# Patient Record
Sex: FEMALE | Race: WHITE | NOT HISPANIC OR LATINO | Employment: OTHER | URBAN - METROPOLITAN AREA
[De-identification: names, ages, dates, MRNs, and addresses within clinical notes are randomized per-mention and may not be internally consistent; named-entity substitution may affect disease eponyms.]

---

## 2017-01-09 ENCOUNTER — COMMUNICATION - HEALTHEAST (OUTPATIENT)
Dept: INTERNAL MEDICINE | Facility: CLINIC | Age: 57
End: 2017-01-09

## 2017-01-09 ENCOUNTER — AMBULATORY - HEALTHEAST (OUTPATIENT)
Dept: LAB | Facility: CLINIC | Age: 57
End: 2017-01-09

## 2017-01-09 DIAGNOSIS — D73.5 SPLENIC INFARCT: ICD-10-CM

## 2017-02-17 ENCOUNTER — COMMUNICATION - HEALTHEAST (OUTPATIENT)
Dept: INTERNAL MEDICINE | Facility: CLINIC | Age: 57
End: 2017-02-17

## 2017-02-17 ENCOUNTER — AMBULATORY - HEALTHEAST (OUTPATIENT)
Dept: LAB | Facility: CLINIC | Age: 57
End: 2017-02-17

## 2017-02-17 DIAGNOSIS — D73.5 SPLENIC INFARCT: ICD-10-CM

## 2017-03-15 ENCOUNTER — RECORDS - HEALTHEAST (OUTPATIENT)
Dept: ADMINISTRATIVE | Facility: OTHER | Age: 57
End: 2017-03-15

## 2017-03-15 ENCOUNTER — COMMUNICATION - HEALTHEAST (OUTPATIENT)
Dept: SCHEDULING | Facility: CLINIC | Age: 57
End: 2017-03-15

## 2017-03-20 ENCOUNTER — OFFICE VISIT - HEALTHEAST (OUTPATIENT)
Dept: FAMILY MEDICINE | Facility: CLINIC | Age: 57
End: 2017-03-20

## 2017-03-20 ENCOUNTER — COMMUNICATION - HEALTHEAST (OUTPATIENT)
Dept: NURSING | Facility: CLINIC | Age: 57
End: 2017-03-20

## 2017-03-20 DIAGNOSIS — R05.9 COUGH: ICD-10-CM

## 2017-03-20 DIAGNOSIS — D73.5 SPLENIC INFARCT: ICD-10-CM

## 2017-03-20 DIAGNOSIS — R09.89 CHEST CONGESTION: ICD-10-CM

## 2017-03-20 ASSESSMENT — MIFFLIN-ST. JEOR: SCORE: 1479.69

## 2017-04-03 ENCOUNTER — COMMUNICATION - HEALTHEAST (OUTPATIENT)
Dept: INTERNAL MEDICINE | Facility: CLINIC | Age: 57
End: 2017-04-03

## 2017-04-03 ENCOUNTER — AMBULATORY - HEALTHEAST (OUTPATIENT)
Dept: LAB | Facility: CLINIC | Age: 57
End: 2017-04-03

## 2017-04-03 DIAGNOSIS — D73.5 SPLENIC INFARCT: ICD-10-CM

## 2017-04-25 ENCOUNTER — COMMUNICATION - HEALTHEAST (OUTPATIENT)
Dept: FAMILY MEDICINE | Facility: CLINIC | Age: 57
End: 2017-04-25

## 2017-04-25 DIAGNOSIS — D73.5 SPLENIC INFARCT: ICD-10-CM

## 2017-04-26 ENCOUNTER — COMMUNICATION - HEALTHEAST (OUTPATIENT)
Dept: FAMILY MEDICINE | Facility: CLINIC | Age: 57
End: 2017-04-26

## 2017-05-04 ENCOUNTER — OFFICE VISIT - HEALTHEAST (OUTPATIENT)
Dept: FAMILY MEDICINE | Facility: CLINIC | Age: 57
End: 2017-05-04

## 2017-05-04 ENCOUNTER — COMMUNICATION - HEALTHEAST (OUTPATIENT)
Dept: SCHEDULING | Facility: CLINIC | Age: 57
End: 2017-05-04

## 2017-05-04 DIAGNOSIS — R51.9 SCALP PAIN: ICD-10-CM

## 2017-05-04 DIAGNOSIS — H92.01 RIGHT EAR PAIN: ICD-10-CM

## 2017-05-05 ENCOUNTER — COMMUNICATION - HEALTHEAST (OUTPATIENT)
Dept: FAMILY MEDICINE | Facility: CLINIC | Age: 57
End: 2017-05-05

## 2017-05-09 ENCOUNTER — RECORDS - HEALTHEAST (OUTPATIENT)
Dept: ADMINISTRATIVE | Facility: OTHER | Age: 57
End: 2017-05-09

## 2017-05-15 ENCOUNTER — AMBULATORY - HEALTHEAST (OUTPATIENT)
Dept: LAB | Facility: CLINIC | Age: 57
End: 2017-05-15

## 2017-05-15 ENCOUNTER — COMMUNICATION - HEALTHEAST (OUTPATIENT)
Dept: INTERNAL MEDICINE | Facility: CLINIC | Age: 57
End: 2017-05-15

## 2017-05-15 DIAGNOSIS — D73.5 SPLENIC INFARCT: ICD-10-CM

## 2017-06-13 ENCOUNTER — AMBULATORY - HEALTHEAST (OUTPATIENT)
Dept: LAB | Facility: CLINIC | Age: 57
End: 2017-06-13

## 2017-06-13 ENCOUNTER — COMMUNICATION - HEALTHEAST (OUTPATIENT)
Dept: INTERNAL MEDICINE | Facility: CLINIC | Age: 57
End: 2017-06-13

## 2017-06-13 DIAGNOSIS — D73.5 SPLENIC INFARCT: ICD-10-CM

## 2017-07-20 ENCOUNTER — AMBULATORY - HEALTHEAST (OUTPATIENT)
Dept: LAB | Facility: CLINIC | Age: 57
End: 2017-07-20

## 2017-07-20 ENCOUNTER — COMMUNICATION - HEALTHEAST (OUTPATIENT)
Dept: INTERNAL MEDICINE | Facility: CLINIC | Age: 57
End: 2017-07-20

## 2017-07-20 DIAGNOSIS — D73.5 SPLENIC INFARCT: ICD-10-CM

## 2017-08-31 ENCOUNTER — COMMUNICATION - HEALTHEAST (OUTPATIENT)
Dept: INTERNAL MEDICINE | Facility: CLINIC | Age: 57
End: 2017-08-31

## 2017-08-31 ENCOUNTER — AMBULATORY - HEALTHEAST (OUTPATIENT)
Dept: LAB | Facility: CLINIC | Age: 57
End: 2017-08-31

## 2017-08-31 DIAGNOSIS — D73.5 SPLENIC INFARCT: ICD-10-CM

## 2017-09-19 ENCOUNTER — RECORDS - HEALTHEAST (OUTPATIENT)
Dept: ADMINISTRATIVE | Facility: OTHER | Age: 57
End: 2017-09-19

## 2017-10-11 ENCOUNTER — COMMUNICATION - HEALTHEAST (OUTPATIENT)
Dept: INTERNAL MEDICINE | Facility: CLINIC | Age: 57
End: 2017-10-11

## 2017-10-11 ENCOUNTER — AMBULATORY - HEALTHEAST (OUTPATIENT)
Dept: LAB | Facility: CLINIC | Age: 57
End: 2017-10-11

## 2017-10-11 DIAGNOSIS — D73.5 SPLENIC INFARCT: ICD-10-CM

## 2017-11-22 ENCOUNTER — AMBULATORY - HEALTHEAST (OUTPATIENT)
Dept: LAB | Facility: CLINIC | Age: 57
End: 2017-11-22

## 2017-11-22 ENCOUNTER — COMMUNICATION - HEALTHEAST (OUTPATIENT)
Dept: NURSING | Facility: CLINIC | Age: 57
End: 2017-11-22

## 2017-11-22 DIAGNOSIS — D73.5 SPLENIC INFARCT: ICD-10-CM

## 2017-12-19 ENCOUNTER — OFFICE VISIT - HEALTHEAST (OUTPATIENT)
Dept: FAMILY MEDICINE | Facility: CLINIC | Age: 57
End: 2017-12-19

## 2017-12-19 ENCOUNTER — COMMUNICATION - HEALTHEAST (OUTPATIENT)
Dept: INTERNAL MEDICINE | Facility: CLINIC | Age: 57
End: 2017-12-19

## 2017-12-19 ENCOUNTER — COMMUNICATION - HEALTHEAST (OUTPATIENT)
Dept: FAMILY MEDICINE | Facility: CLINIC | Age: 57
End: 2017-12-19

## 2017-12-19 DIAGNOSIS — D73.5 SPLENIC INFARCT: ICD-10-CM

## 2017-12-19 DIAGNOSIS — Z12.31 VISIT FOR SCREENING MAMMOGRAM: ICD-10-CM

## 2017-12-19 DIAGNOSIS — J01.00 ACUTE MAXILLARY SINUSITIS: ICD-10-CM

## 2017-12-19 DIAGNOSIS — I77.3 ARTERIAL FIBROMUSCULAR DYSPLASIA (H): ICD-10-CM

## 2017-12-19 ASSESSMENT — MIFFLIN-ST. JEOR: SCORE: 1506.06

## 2017-12-22 ENCOUNTER — AMBULATORY - HEALTHEAST (OUTPATIENT)
Dept: LAB | Facility: CLINIC | Age: 57
End: 2017-12-22

## 2017-12-22 ENCOUNTER — COMMUNICATION - HEALTHEAST (OUTPATIENT)
Dept: INTERNAL MEDICINE | Facility: CLINIC | Age: 57
End: 2017-12-22

## 2017-12-22 DIAGNOSIS — D73.5 SPLENIC INFARCT: ICD-10-CM

## 2017-12-29 ENCOUNTER — COMMUNICATION - HEALTHEAST (OUTPATIENT)
Dept: FAMILY MEDICINE | Facility: CLINIC | Age: 57
End: 2017-12-29

## 2018-01-03 ENCOUNTER — OFFICE VISIT - HEALTHEAST (OUTPATIENT)
Dept: FAMILY MEDICINE | Facility: CLINIC | Age: 58
End: 2018-01-03

## 2018-01-03 DIAGNOSIS — M54.9 BACK PAIN: ICD-10-CM

## 2018-01-03 DIAGNOSIS — J10.1 INFLUENZA A: ICD-10-CM

## 2018-01-03 DIAGNOSIS — R05.9 COUGH: ICD-10-CM

## 2018-01-03 DIAGNOSIS — Z79.01 ANTICOAGULATED: ICD-10-CM

## 2018-01-03 LAB
BASOPHILS # BLD AUTO: 0 THOU/UL (ref 0–0.2)
BASOPHILS NFR BLD AUTO: 1 % (ref 0–2)
EOSINOPHIL # BLD AUTO: 0.1 THOU/UL (ref 0–0.4)
EOSINOPHIL NFR BLD AUTO: 2 % (ref 0–6)
ERYTHROCYTE [DISTWIDTH] IN BLOOD BY AUTOMATED COUNT: 11.4 % (ref 11–14.5)
FLUAV AG SPEC QL IA: ABNORMAL
FLUBV AG SPEC QL IA: ABNORMAL
HCT VFR BLD AUTO: 40.3 % (ref 35–47)
HGB BLD-MCNC: 13.7 G/DL (ref 12–16)
INR PPP: 1.9 (ref 0.9–1.1)
LYMPHOCYTES # BLD AUTO: 1.5 THOU/UL (ref 0.8–4.4)
LYMPHOCYTES NFR BLD AUTO: 48 % (ref 20–40)
MCH RBC QN AUTO: 31.2 PG (ref 27–34)
MCHC RBC AUTO-ENTMCNC: 33.9 G/DL (ref 32–36)
MCV RBC AUTO: 92 FL (ref 80–100)
MONOCYTES # BLD AUTO: 0.4 THOU/UL (ref 0–0.9)
MONOCYTES NFR BLD AUTO: 12 % (ref 2–10)
NEUTROPHILS # BLD AUTO: 1.2 THOU/UL (ref 2–7.7)
NEUTROPHILS NFR BLD AUTO: 38 % (ref 50–70)
PLATELET # BLD AUTO: 224 THOU/UL (ref 140–440)
PMV BLD AUTO: 7.3 FL (ref 7–10)
RBC # BLD AUTO: 4.38 MILL/UL (ref 3.8–5.4)
WBC: 3.2 THOU/UL (ref 4–11)

## 2018-01-03 RX ORDER — WARFARIN SODIUM 7.5 MG/1
7.5 TABLET ORAL
Status: SHIPPED | COMMUNITY
Start: 2017-12-11 | End: 2022-09-13

## 2018-01-04 ENCOUNTER — COMMUNICATION - HEALTHEAST (OUTPATIENT)
Dept: FAMILY MEDICINE | Facility: CLINIC | Age: 58
End: 2018-01-04

## 2018-01-04 DIAGNOSIS — D73.5 SPLENIC INFARCT: ICD-10-CM

## 2018-01-08 ENCOUNTER — COMMUNICATION - HEALTHEAST (OUTPATIENT)
Dept: FAMILY MEDICINE | Facility: CLINIC | Age: 58
End: 2018-01-08

## 2018-01-08 ENCOUNTER — COMMUNICATION - HEALTHEAST (OUTPATIENT)
Dept: INTERNAL MEDICINE | Facility: CLINIC | Age: 58
End: 2018-01-08

## 2018-01-08 ENCOUNTER — AMBULATORY - HEALTHEAST (OUTPATIENT)
Dept: LAB | Facility: CLINIC | Age: 58
End: 2018-01-08

## 2018-01-08 DIAGNOSIS — D73.5 SPLENIC INFARCT: ICD-10-CM

## 2018-01-08 LAB — INR PPP: 2.9 (ref 0.9–1.1)

## 2018-01-09 ENCOUNTER — COMMUNICATION - HEALTHEAST (OUTPATIENT)
Dept: FAMILY MEDICINE | Facility: CLINIC | Age: 58
End: 2018-01-09

## 2018-01-17 ENCOUNTER — COMMUNICATION - HEALTHEAST (OUTPATIENT)
Dept: FAMILY MEDICINE | Facility: CLINIC | Age: 58
End: 2018-01-17

## 2018-01-22 ENCOUNTER — COMMUNICATION - HEALTHEAST (OUTPATIENT)
Dept: INTERNAL MEDICINE | Facility: CLINIC | Age: 58
End: 2018-01-22

## 2018-01-22 ENCOUNTER — AMBULATORY - HEALTHEAST (OUTPATIENT)
Dept: LAB | Facility: CLINIC | Age: 58
End: 2018-01-22

## 2018-01-22 DIAGNOSIS — D73.5 SPLENIC INFARCT: ICD-10-CM

## 2018-01-22 LAB — INR PPP: 3.2 (ref 0.9–1.1)

## 2018-01-26 ENCOUNTER — COMMUNICATION - HEALTHEAST (OUTPATIENT)
Dept: FAMILY MEDICINE | Facility: CLINIC | Age: 58
End: 2018-01-26

## 2018-02-06 ENCOUNTER — COMMUNICATION - HEALTHEAST (OUTPATIENT)
Dept: INTERNAL MEDICINE | Facility: CLINIC | Age: 58
End: 2018-02-06

## 2018-02-06 ENCOUNTER — AMBULATORY - HEALTHEAST (OUTPATIENT)
Dept: LAB | Facility: CLINIC | Age: 58
End: 2018-02-06

## 2018-02-06 DIAGNOSIS — D73.5 SPLENIC INFARCT: ICD-10-CM

## 2018-02-06 LAB — INR PPP: 2.5 (ref 0.9–1.1)

## 2018-03-02 ENCOUNTER — AMBULATORY - HEALTHEAST (OUTPATIENT)
Dept: LAB | Facility: CLINIC | Age: 58
End: 2018-03-02

## 2018-03-02 ENCOUNTER — COMMUNICATION - HEALTHEAST (OUTPATIENT)
Dept: INTERNAL MEDICINE | Facility: CLINIC | Age: 58
End: 2018-03-02

## 2018-03-02 DIAGNOSIS — D73.5 SPLENIC INFARCT: ICD-10-CM

## 2018-03-02 LAB — INR PPP: 3.1 (ref 0.9–1.1)

## 2018-03-16 ENCOUNTER — COMMUNICATION - HEALTHEAST (OUTPATIENT)
Dept: NURSING | Facility: CLINIC | Age: 58
End: 2018-03-16

## 2018-03-16 ENCOUNTER — AMBULATORY - HEALTHEAST (OUTPATIENT)
Dept: LAB | Facility: CLINIC | Age: 58
End: 2018-03-16

## 2018-03-16 DIAGNOSIS — D73.5 SPLENIC INFARCT: ICD-10-CM

## 2018-03-16 LAB — INR PPP: 3.1 (ref 0.9–1.1)

## 2018-03-30 ENCOUNTER — AMBULATORY - HEALTHEAST (OUTPATIENT)
Dept: LAB | Facility: CLINIC | Age: 58
End: 2018-03-30

## 2018-03-30 ENCOUNTER — COMMUNICATION - HEALTHEAST (OUTPATIENT)
Dept: ANTICOAGULATION | Facility: CLINIC | Age: 58
End: 2018-03-30

## 2018-03-30 DIAGNOSIS — D73.5 SPLENIC INFARCT: ICD-10-CM

## 2018-03-30 LAB — INR PPP: 2.7 (ref 0.9–1.1)

## 2018-04-13 ENCOUNTER — AMBULATORY - HEALTHEAST (OUTPATIENT)
Dept: LAB | Facility: CLINIC | Age: 58
End: 2018-04-13

## 2018-04-13 ENCOUNTER — COMMUNICATION - HEALTHEAST (OUTPATIENT)
Dept: ANTICOAGULATION | Facility: CLINIC | Age: 58
End: 2018-04-13

## 2018-04-13 DIAGNOSIS — D73.5 SPLENIC INFARCT: ICD-10-CM

## 2018-04-13 LAB — INR PPP: 2.4 (ref 0.9–1.1)

## 2018-05-14 ENCOUNTER — AMBULATORY - HEALTHEAST (OUTPATIENT)
Dept: LAB | Facility: CLINIC | Age: 58
End: 2018-05-14

## 2018-05-14 ENCOUNTER — COMMUNICATION - HEALTHEAST (OUTPATIENT)
Dept: ANTICOAGULATION | Facility: CLINIC | Age: 58
End: 2018-05-14

## 2018-05-14 DIAGNOSIS — D73.5 SPLENIC INFARCT: ICD-10-CM

## 2018-05-14 DIAGNOSIS — D68.62 LUPUS ANTICOAGULANT DISORDER (H): ICD-10-CM

## 2018-05-14 LAB — INR PPP: 2.3 (ref 0.9–1.1)

## 2018-05-22 ENCOUNTER — OFFICE VISIT - HEALTHEAST (OUTPATIENT)
Dept: FAMILY MEDICINE | Facility: CLINIC | Age: 58
End: 2018-05-22

## 2018-05-22 DIAGNOSIS — S93.401A RIGHT ANKLE SPRAIN: ICD-10-CM

## 2018-05-22 DIAGNOSIS — M25.512 ACUTE PAIN OF LEFT SHOULDER: ICD-10-CM

## 2018-05-22 ASSESSMENT — MIFFLIN-ST. JEOR: SCORE: 1493.59

## 2018-06-21 ENCOUNTER — AMBULATORY - HEALTHEAST (OUTPATIENT)
Dept: LAB | Facility: CLINIC | Age: 58
End: 2018-06-21

## 2018-06-21 ENCOUNTER — COMMUNICATION - HEALTHEAST (OUTPATIENT)
Dept: ANTICOAGULATION | Facility: CLINIC | Age: 58
End: 2018-06-21

## 2018-06-21 DIAGNOSIS — D73.5 SPLENIC INFARCT: ICD-10-CM

## 2018-06-21 DIAGNOSIS — D68.62 LUPUS ANTICOAGULANT DISORDER (H): ICD-10-CM

## 2018-06-21 LAB — INR PPP: 2.9 (ref 0.9–1.1)

## 2018-07-26 ENCOUNTER — COMMUNICATION - HEALTHEAST (OUTPATIENT)
Dept: ANTICOAGULATION | Facility: CLINIC | Age: 58
End: 2018-07-26

## 2018-07-26 ENCOUNTER — AMBULATORY - HEALTHEAST (OUTPATIENT)
Dept: LAB | Facility: CLINIC | Age: 58
End: 2018-07-26

## 2018-07-26 DIAGNOSIS — D68.62 LUPUS ANTICOAGULANT DISORDER (H): ICD-10-CM

## 2018-07-26 DIAGNOSIS — D73.5 SPLENIC INFARCT: ICD-10-CM

## 2018-07-26 LAB — INR PPP: 2.6 (ref 0.9–1.1)

## 2018-08-09 ENCOUNTER — COMMUNICATION - HEALTHEAST (OUTPATIENT)
Dept: FAMILY MEDICINE | Facility: CLINIC | Age: 58
End: 2018-08-09

## 2018-08-23 ENCOUNTER — AMBULATORY - HEALTHEAST (OUTPATIENT)
Dept: LAB | Facility: CLINIC | Age: 58
End: 2018-08-23

## 2018-08-23 ENCOUNTER — COMMUNICATION - HEALTHEAST (OUTPATIENT)
Dept: ANTICOAGULATION | Facility: CLINIC | Age: 58
End: 2018-08-23

## 2018-08-23 DIAGNOSIS — D73.5 SPLENIC INFARCT: ICD-10-CM

## 2018-08-23 DIAGNOSIS — D68.62 LUPUS ANTICOAGULANT DISORDER (H): ICD-10-CM

## 2018-08-23 LAB — INR PPP: 2.2 (ref 0.9–1.1)

## 2018-09-17 ENCOUNTER — COMMUNICATION - HEALTHEAST (OUTPATIENT)
Dept: ANTICOAGULATION | Facility: CLINIC | Age: 58
End: 2018-09-17

## 2018-09-17 ENCOUNTER — AMBULATORY - HEALTHEAST (OUTPATIENT)
Dept: LAB | Facility: CLINIC | Age: 58
End: 2018-09-17

## 2018-09-17 DIAGNOSIS — D73.5 SPLENIC INFARCT: ICD-10-CM

## 2018-09-17 DIAGNOSIS — D68.62 LUPUS ANTICOAGULANT DISORDER (H): ICD-10-CM

## 2018-09-17 LAB — INR PPP: 3.1 (ref 0.9–1.1)

## 2018-10-02 ENCOUNTER — COMMUNICATION - HEALTHEAST (OUTPATIENT)
Dept: FAMILY MEDICINE | Facility: CLINIC | Age: 58
End: 2018-10-02

## 2018-10-02 ENCOUNTER — COMMUNICATION - HEALTHEAST (OUTPATIENT)
Dept: ANTICOAGULATION | Facility: CLINIC | Age: 58
End: 2018-10-02

## 2018-10-02 ENCOUNTER — AMBULATORY - HEALTHEAST (OUTPATIENT)
Dept: LAB | Facility: CLINIC | Age: 58
End: 2018-10-02

## 2018-10-02 DIAGNOSIS — D73.5 SPLENIC INFARCT: ICD-10-CM

## 2018-10-02 DIAGNOSIS — D68.62 LUPUS ANTICOAGULANT DISORDER (H): ICD-10-CM

## 2018-10-02 LAB — INR PPP: 2.9 (ref 0.9–1.1)

## 2018-11-01 ENCOUNTER — COMMUNICATION - HEALTHEAST (OUTPATIENT)
Dept: ANTICOAGULATION | Facility: CLINIC | Age: 58
End: 2018-11-01

## 2018-11-01 ENCOUNTER — AMBULATORY - HEALTHEAST (OUTPATIENT)
Dept: LAB | Facility: CLINIC | Age: 58
End: 2018-11-01

## 2018-11-01 DIAGNOSIS — D68.62 LUPUS ANTICOAGULANT DISORDER (H): ICD-10-CM

## 2018-11-01 DIAGNOSIS — D73.5 SPLENIC INFARCT: ICD-10-CM

## 2018-11-01 LAB — INR PPP: 2.3 (ref 0.9–1.1)

## 2018-12-04 ENCOUNTER — COMMUNICATION - HEALTHEAST (OUTPATIENT)
Dept: ANTICOAGULATION | Facility: CLINIC | Age: 58
End: 2018-12-04

## 2018-12-04 ENCOUNTER — AMBULATORY - HEALTHEAST (OUTPATIENT)
Dept: LAB | Facility: CLINIC | Age: 58
End: 2018-12-04

## 2018-12-04 DIAGNOSIS — D73.5 SPLENIC INFARCT: ICD-10-CM

## 2018-12-04 DIAGNOSIS — D68.62 LUPUS ANTICOAGULANT DISORDER (H): ICD-10-CM

## 2018-12-04 LAB — INR PPP: 2 (ref 0.9–1.1)

## 2018-12-07 ENCOUNTER — OFFICE VISIT - HEALTHEAST (OUTPATIENT)
Dept: FAMILY MEDICINE | Facility: CLINIC | Age: 58
End: 2018-12-07

## 2018-12-07 DIAGNOSIS — D68.62 LUPUS ANTICOAGULANT DISORDER (H): ICD-10-CM

## 2018-12-07 DIAGNOSIS — E03.9 ACQUIRED HYPOTHYROIDISM: ICD-10-CM

## 2018-12-07 DIAGNOSIS — M79.672 LEFT FOOT PAIN: ICD-10-CM

## 2018-12-07 DIAGNOSIS — I10 HYPERTENSION, UNSPECIFIED TYPE: ICD-10-CM

## 2018-12-07 LAB
ANION GAP SERPL CALCULATED.3IONS-SCNC: 9 MMOL/L (ref 5–18)
BUN SERPL-MCNC: 9 MG/DL (ref 8–22)
CALCIUM SERPL-MCNC: 9.6 MG/DL (ref 8.5–10.5)
CHLORIDE BLD-SCNC: 102 MMOL/L (ref 98–107)
CO2 SERPL-SCNC: 28 MMOL/L (ref 22–31)
CREAT SERPL-MCNC: 0.76 MG/DL (ref 0.6–1.1)
ERYTHROCYTE [DISTWIDTH] IN BLOOD BY AUTOMATED COUNT: 12.5 % (ref 11–14.5)
GFR SERPL CREATININE-BSD FRML MDRD: >60 ML/MIN/1.73M2
GLUCOSE BLD-MCNC: 94 MG/DL (ref 70–125)
HCT VFR BLD AUTO: 43.2 % (ref 35–47)
HGB BLD-MCNC: 14.2 G/DL (ref 12–16)
MCH RBC QN AUTO: 30.8 PG (ref 27–34)
MCHC RBC AUTO-ENTMCNC: 32.9 G/DL (ref 32–36)
MCV RBC AUTO: 94 FL (ref 80–100)
PLATELET # BLD AUTO: 281 THOU/UL (ref 140–440)
PMV BLD AUTO: 9.9 FL (ref 8.5–12.5)
POTASSIUM BLD-SCNC: 4.1 MMOL/L (ref 3.5–5)
RBC # BLD AUTO: 4.61 MILL/UL (ref 3.8–5.4)
SODIUM SERPL-SCNC: 139 MMOL/L (ref 136–145)
WBC: 5 THOU/UL (ref 4–11)

## 2018-12-07 ASSESSMENT — MIFFLIN-ST. JEOR: SCORE: 1451.63

## 2018-12-10 ENCOUNTER — COMMUNICATION - HEALTHEAST (OUTPATIENT)
Dept: FAMILY MEDICINE | Facility: CLINIC | Age: 58
End: 2018-12-10

## 2018-12-12 ENCOUNTER — COMMUNICATION - HEALTHEAST (OUTPATIENT)
Dept: FAMILY MEDICINE | Facility: CLINIC | Age: 58
End: 2018-12-12

## 2018-12-12 DIAGNOSIS — D73.5 SPLENIC INFARCT: ICD-10-CM

## 2018-12-21 ENCOUNTER — AMBULATORY - HEALTHEAST (OUTPATIENT)
Dept: LAB | Facility: CLINIC | Age: 58
End: 2018-12-21

## 2018-12-21 ENCOUNTER — COMMUNICATION - HEALTHEAST (OUTPATIENT)
Dept: FAMILY MEDICINE | Facility: CLINIC | Age: 58
End: 2018-12-21

## 2018-12-21 DIAGNOSIS — D73.5 SPLENIC INFARCT: ICD-10-CM

## 2018-12-21 DIAGNOSIS — D68.62 LUPUS ANTICOAGULANT DISORDER (H): ICD-10-CM

## 2018-12-21 LAB — INR PPP: 3.5 (ref 0.9–1.1)

## 2019-01-04 ENCOUNTER — COMMUNICATION - HEALTHEAST (OUTPATIENT)
Dept: ANTICOAGULATION | Facility: CLINIC | Age: 59
End: 2019-01-04

## 2019-01-04 ENCOUNTER — AMBULATORY - HEALTHEAST (OUTPATIENT)
Dept: LAB | Facility: CLINIC | Age: 59
End: 2019-01-04

## 2019-01-04 DIAGNOSIS — D73.5 SPLENIC INFARCT: ICD-10-CM

## 2019-01-04 DIAGNOSIS — D68.62 LUPUS ANTICOAGULANT DISORDER (H): ICD-10-CM

## 2019-01-04 LAB — INR PPP: 2.7 (ref 0.9–1.1)

## 2019-01-18 ENCOUNTER — COMMUNICATION - HEALTHEAST (OUTPATIENT)
Dept: ANTICOAGULATION | Facility: CLINIC | Age: 59
End: 2019-01-18

## 2019-01-18 ENCOUNTER — AMBULATORY - HEALTHEAST (OUTPATIENT)
Dept: LAB | Facility: CLINIC | Age: 59
End: 2019-01-18

## 2019-01-18 DIAGNOSIS — D73.5 SPLENIC INFARCT: ICD-10-CM

## 2019-01-18 DIAGNOSIS — D68.62 LUPUS ANTICOAGULANT DISORDER (H): ICD-10-CM

## 2019-01-18 LAB — INR PPP: 2.8 (ref 0.9–1.1)

## 2019-02-18 ENCOUNTER — COMMUNICATION - HEALTHEAST (OUTPATIENT)
Dept: ANTICOAGULATION | Facility: CLINIC | Age: 59
End: 2019-02-18

## 2019-02-18 ENCOUNTER — AMBULATORY - HEALTHEAST (OUTPATIENT)
Dept: LAB | Facility: CLINIC | Age: 59
End: 2019-02-18

## 2019-02-18 DIAGNOSIS — D73.5 SPLENIC INFARCT: ICD-10-CM

## 2019-02-18 DIAGNOSIS — D68.62 LUPUS ANTICOAGULANT DISORDER (H): ICD-10-CM

## 2019-02-18 LAB — INR PPP: 2.2 (ref 0.9–1.1)

## 2019-03-21 ENCOUNTER — COMMUNICATION - HEALTHEAST (OUTPATIENT)
Dept: ANTICOAGULATION | Facility: CLINIC | Age: 59
End: 2019-03-21

## 2019-03-21 ENCOUNTER — AMBULATORY - HEALTHEAST (OUTPATIENT)
Dept: LAB | Facility: CLINIC | Age: 59
End: 2019-03-21

## 2019-03-21 DIAGNOSIS — D73.5 SPLENIC INFARCT: ICD-10-CM

## 2019-03-21 DIAGNOSIS — D68.62 LUPUS ANTICOAGULANT DISORDER (H): ICD-10-CM

## 2019-03-21 LAB — INR PPP: 2 (ref 0.9–1.1)

## 2019-03-22 ENCOUNTER — COMMUNICATION - HEALTHEAST (OUTPATIENT)
Dept: ANTICOAGULATION | Facility: CLINIC | Age: 59
End: 2019-03-22

## 2019-03-22 DIAGNOSIS — D68.62 LUPUS ANTICOAGULANT DISORDER (H): ICD-10-CM

## 2019-03-22 DIAGNOSIS — D73.5 SPLENIC INFARCT: ICD-10-CM

## 2019-04-25 ENCOUNTER — COMMUNICATION - HEALTHEAST (OUTPATIENT)
Dept: FAMILY MEDICINE | Facility: CLINIC | Age: 59
End: 2019-04-25

## 2019-04-25 ENCOUNTER — COMMUNICATION - HEALTHEAST (OUTPATIENT)
Dept: ANTICOAGULATION | Facility: CLINIC | Age: 59
End: 2019-04-25

## 2019-04-25 DIAGNOSIS — D73.5 SPLENIC INFARCT: ICD-10-CM

## 2019-04-25 DIAGNOSIS — D68.62 LUPUS ANTICOAGULANT DISORDER (H): ICD-10-CM

## 2019-04-26 ENCOUNTER — AMBULATORY - HEALTHEAST (OUTPATIENT)
Dept: LAB | Facility: CLINIC | Age: 59
End: 2019-04-26

## 2019-04-26 ENCOUNTER — COMMUNICATION - HEALTHEAST (OUTPATIENT)
Dept: ANTICOAGULATION | Facility: CLINIC | Age: 59
End: 2019-04-26

## 2019-04-26 DIAGNOSIS — D73.5 SPLENIC INFARCT: ICD-10-CM

## 2019-04-26 DIAGNOSIS — D68.62 LUPUS ANTICOAGULANT DISORDER (H): ICD-10-CM

## 2019-04-26 LAB — INR PPP: 2.4 (ref 0.9–1.1)

## 2019-05-08 ENCOUNTER — RECORDS - HEALTHEAST (OUTPATIENT)
Dept: ADMINISTRATIVE | Facility: OTHER | Age: 59
End: 2019-05-08

## 2019-05-28 ENCOUNTER — AMBULATORY - HEALTHEAST (OUTPATIENT)
Dept: LAB | Facility: CLINIC | Age: 59
End: 2019-05-28

## 2019-05-28 ENCOUNTER — COMMUNICATION - HEALTHEAST (OUTPATIENT)
Dept: ANTICOAGULATION | Facility: CLINIC | Age: 59
End: 2019-05-28

## 2019-05-28 DIAGNOSIS — D68.62 LUPUS ANTICOAGULANT DISORDER (H): ICD-10-CM

## 2019-05-28 DIAGNOSIS — D73.5 SPLENIC INFARCT: ICD-10-CM

## 2019-05-28 LAB — INR PPP: 2.7 (ref 0.9–1.1)

## 2019-07-08 ENCOUNTER — AMBULATORY - HEALTHEAST (OUTPATIENT)
Dept: LAB | Facility: CLINIC | Age: 59
End: 2019-07-08

## 2019-07-08 ENCOUNTER — COMMUNICATION - HEALTHEAST (OUTPATIENT)
Dept: ANTICOAGULATION | Facility: CLINIC | Age: 59
End: 2019-07-08

## 2019-07-08 DIAGNOSIS — D73.5 SPLENIC INFARCT: ICD-10-CM

## 2019-07-08 DIAGNOSIS — D68.62 LUPUS ANTICOAGULANT DISORDER (H): ICD-10-CM

## 2019-07-08 LAB — INR PPP: 2.8 (ref 0.9–1.1)

## 2019-08-12 ENCOUNTER — COMMUNICATION - HEALTHEAST (OUTPATIENT)
Dept: FAMILY MEDICINE | Facility: CLINIC | Age: 59
End: 2019-08-12

## 2019-08-14 ENCOUNTER — COMMUNICATION - HEALTHEAST (OUTPATIENT)
Dept: FAMILY MEDICINE | Facility: CLINIC | Age: 59
End: 2019-08-14

## 2019-08-14 ENCOUNTER — AMBULATORY - HEALTHEAST (OUTPATIENT)
Dept: LAB | Facility: CLINIC | Age: 59
End: 2019-08-14

## 2019-08-14 ENCOUNTER — COMMUNICATION - HEALTHEAST (OUTPATIENT)
Dept: ANTICOAGULATION | Facility: CLINIC | Age: 59
End: 2019-08-14

## 2019-08-14 DIAGNOSIS — D68.62 LUPUS ANTICOAGULANT DISORDER (H): ICD-10-CM

## 2019-08-14 DIAGNOSIS — D73.5 SPLENIC INFARCT: ICD-10-CM

## 2019-08-14 LAB — INR PPP: 3.5 (ref 0.9–1.1)

## 2019-08-26 ENCOUNTER — OFFICE VISIT - HEALTHEAST (OUTPATIENT)
Dept: FAMILY MEDICINE | Facility: CLINIC | Age: 59
End: 2019-08-26

## 2019-08-26 ENCOUNTER — COMMUNICATION - HEALTHEAST (OUTPATIENT)
Dept: ANTICOAGULATION | Facility: CLINIC | Age: 59
End: 2019-08-26

## 2019-08-26 DIAGNOSIS — D73.5 SPLENIC INFARCT: ICD-10-CM

## 2019-08-26 DIAGNOSIS — J01.90 ACUTE NON-RECURRENT SINUSITIS, UNSPECIFIED LOCATION: ICD-10-CM

## 2019-08-26 DIAGNOSIS — D68.62 LUPUS ANTICOAGULANT DISORDER (H): ICD-10-CM

## 2019-08-26 LAB — INR PPP: 3 (ref 0.9–1.1)

## 2019-09-12 ENCOUNTER — COMMUNICATION - HEALTHEAST (OUTPATIENT)
Dept: ANTICOAGULATION | Facility: CLINIC | Age: 59
End: 2019-09-12

## 2019-09-12 ENCOUNTER — AMBULATORY - HEALTHEAST (OUTPATIENT)
Dept: LAB | Facility: CLINIC | Age: 59
End: 2019-09-12

## 2019-09-12 DIAGNOSIS — D68.62 LUPUS ANTICOAGULANT DISORDER (H): ICD-10-CM

## 2019-09-12 DIAGNOSIS — D73.5 SPLENIC INFARCT: ICD-10-CM

## 2019-09-12 LAB — INR PPP: 2.7 (ref 0.9–1.1)

## 2019-10-11 ENCOUNTER — AMBULATORY - HEALTHEAST (OUTPATIENT)
Dept: LAB | Facility: CLINIC | Age: 59
End: 2019-10-11

## 2019-10-11 ENCOUNTER — COMMUNICATION - HEALTHEAST (OUTPATIENT)
Dept: ANTICOAGULATION | Facility: CLINIC | Age: 59
End: 2019-10-11

## 2019-10-11 DIAGNOSIS — D73.5 SPLENIC INFARCT: ICD-10-CM

## 2019-10-11 DIAGNOSIS — D68.62 LUPUS ANTICOAGULANT DISORDER (H): ICD-10-CM

## 2019-10-11 LAB — INR PPP: 3.3 (ref 0.9–1.1)

## 2019-10-25 ENCOUNTER — AMBULATORY - HEALTHEAST (OUTPATIENT)
Dept: LAB | Facility: CLINIC | Age: 59
End: 2019-10-25

## 2019-10-25 ENCOUNTER — COMMUNICATION - HEALTHEAST (OUTPATIENT)
Dept: ANTICOAGULATION | Facility: CLINIC | Age: 59
End: 2019-10-25

## 2019-10-25 DIAGNOSIS — D73.5 SPLENIC INFARCT: ICD-10-CM

## 2019-10-25 DIAGNOSIS — D68.62 LUPUS ANTICOAGULANT DISORDER (H): ICD-10-CM

## 2019-10-25 LAB — INR PPP: 3 (ref 0.9–1.1)

## 2019-11-08 ENCOUNTER — AMBULATORY - HEALTHEAST (OUTPATIENT)
Dept: LAB | Facility: CLINIC | Age: 59
End: 2019-11-08

## 2019-11-08 ENCOUNTER — COMMUNICATION - HEALTHEAST (OUTPATIENT)
Dept: ANTICOAGULATION | Facility: CLINIC | Age: 59
End: 2019-11-08

## 2019-11-08 DIAGNOSIS — D73.5 SPLENIC INFARCT: ICD-10-CM

## 2019-11-08 DIAGNOSIS — D68.62 LUPUS ANTICOAGULANT DISORDER (H): ICD-10-CM

## 2019-11-08 LAB — INR PPP: 3.2 (ref 0.9–1.1)

## 2019-11-22 ENCOUNTER — AMBULATORY - HEALTHEAST (OUTPATIENT)
Dept: LAB | Facility: CLINIC | Age: 59
End: 2019-11-22

## 2019-11-22 ENCOUNTER — COMMUNICATION - HEALTHEAST (OUTPATIENT)
Dept: ANTICOAGULATION | Facility: CLINIC | Age: 59
End: 2019-11-22

## 2019-11-22 DIAGNOSIS — D73.5 SPLENIC INFARCT: ICD-10-CM

## 2019-11-22 DIAGNOSIS — D68.62 LUPUS ANTICOAGULANT DISORDER (H): ICD-10-CM

## 2019-11-22 LAB — INR PPP: 3.1 (ref 0.9–1.1)

## 2019-12-09 ENCOUNTER — COMMUNICATION - HEALTHEAST (OUTPATIENT)
Dept: ANTICOAGULATION | Facility: CLINIC | Age: 59
End: 2019-12-09

## 2019-12-09 ENCOUNTER — AMBULATORY - HEALTHEAST (OUTPATIENT)
Dept: LAB | Facility: CLINIC | Age: 59
End: 2019-12-09

## 2019-12-09 DIAGNOSIS — D68.62 LUPUS ANTICOAGULANT DISORDER (H): ICD-10-CM

## 2019-12-09 DIAGNOSIS — D73.5 SPLENIC INFARCT: ICD-10-CM

## 2019-12-09 LAB — INR PPP: 2.3 (ref 0.9–1.1)

## 2019-12-24 ENCOUNTER — COMMUNICATION - HEALTHEAST (OUTPATIENT)
Dept: ANTICOAGULATION | Facility: CLINIC | Age: 59
End: 2019-12-24

## 2019-12-24 ENCOUNTER — AMBULATORY - HEALTHEAST (OUTPATIENT)
Dept: LAB | Facility: CLINIC | Age: 59
End: 2019-12-24

## 2019-12-24 DIAGNOSIS — D73.5 SPLENIC INFARCT: ICD-10-CM

## 2019-12-24 DIAGNOSIS — D68.62 LUPUS ANTICOAGULANT DISORDER (H): ICD-10-CM

## 2019-12-24 LAB — INR PPP: 2.5 (ref 0.9–1.1)

## 2020-01-14 ENCOUNTER — COMMUNICATION - HEALTHEAST (OUTPATIENT)
Dept: FAMILY MEDICINE | Facility: CLINIC | Age: 60
End: 2020-01-14

## 2020-01-21 ENCOUNTER — COMMUNICATION - HEALTHEAST (OUTPATIENT)
Dept: ANTICOAGULATION | Facility: CLINIC | Age: 60
End: 2020-01-21

## 2020-01-21 ENCOUNTER — AMBULATORY - HEALTHEAST (OUTPATIENT)
Dept: LAB | Facility: CLINIC | Age: 60
End: 2020-01-21

## 2020-01-21 DIAGNOSIS — D73.5 SPLENIC INFARCT: ICD-10-CM

## 2020-01-21 DIAGNOSIS — D68.62 LUPUS ANTICOAGULANT DISORDER (H): ICD-10-CM

## 2020-01-21 LAB — INR PPP: 3.2 (ref 0.9–1.1)

## 2020-02-04 ENCOUNTER — COMMUNICATION - HEALTHEAST (OUTPATIENT)
Dept: ANTICOAGULATION | Facility: CLINIC | Age: 60
End: 2020-02-04

## 2020-02-04 ENCOUNTER — AMBULATORY - HEALTHEAST (OUTPATIENT)
Dept: LAB | Facility: CLINIC | Age: 60
End: 2020-02-04

## 2020-02-04 DIAGNOSIS — D68.62 LUPUS ANTICOAGULANT DISORDER (H): ICD-10-CM

## 2020-02-04 DIAGNOSIS — D73.5 SPLENIC INFARCT: ICD-10-CM

## 2020-02-04 LAB — INR PPP: 2.8 (ref 0.9–1.1)

## 2020-02-18 ENCOUNTER — COMMUNICATION - HEALTHEAST (OUTPATIENT)
Dept: ANTICOAGULATION | Facility: CLINIC | Age: 60
End: 2020-02-18

## 2020-02-18 ENCOUNTER — AMBULATORY - HEALTHEAST (OUTPATIENT)
Dept: LAB | Facility: CLINIC | Age: 60
End: 2020-02-18

## 2020-02-18 DIAGNOSIS — D73.5 SPLENIC INFARCT: ICD-10-CM

## 2020-02-18 DIAGNOSIS — D68.62 LUPUS ANTICOAGULANT DISORDER (H): ICD-10-CM

## 2020-02-18 LAB — INR PPP: 2.4 (ref 0.9–1.1)

## 2020-02-19 ENCOUNTER — COMMUNICATION - HEALTHEAST (OUTPATIENT)
Dept: FAMILY MEDICINE | Facility: CLINIC | Age: 60
End: 2020-02-19

## 2020-03-12 ENCOUNTER — COMMUNICATION - HEALTHEAST (OUTPATIENT)
Dept: ANTICOAGULATION | Facility: CLINIC | Age: 60
End: 2020-03-12

## 2020-03-12 ENCOUNTER — AMBULATORY - HEALTHEAST (OUTPATIENT)
Dept: LAB | Facility: CLINIC | Age: 60
End: 2020-03-12

## 2020-03-12 DIAGNOSIS — D73.5 SPLENIC INFARCT: ICD-10-CM

## 2020-03-12 DIAGNOSIS — D68.62 LUPUS ANTICOAGULANT DISORDER (H): ICD-10-CM

## 2020-03-12 LAB — INR PPP: 2.5 (ref 0.9–1.1)

## 2020-03-13 ENCOUNTER — COMMUNICATION - HEALTHEAST (OUTPATIENT)
Dept: FAMILY MEDICINE | Facility: CLINIC | Age: 60
End: 2020-03-13

## 2020-04-01 ENCOUNTER — COMMUNICATION - HEALTHEAST (OUTPATIENT)
Dept: FAMILY MEDICINE | Facility: CLINIC | Age: 60
End: 2020-04-01

## 2020-04-02 ENCOUNTER — OFFICE VISIT - HEALTHEAST (OUTPATIENT)
Dept: FAMILY MEDICINE | Facility: CLINIC | Age: 60
End: 2020-04-02

## 2020-04-02 DIAGNOSIS — R19.7 DIARRHEA, UNSPECIFIED TYPE: ICD-10-CM

## 2020-04-06 ENCOUNTER — COMMUNICATION - HEALTHEAST (OUTPATIENT)
Dept: ANTICOAGULATION | Facility: CLINIC | Age: 60
End: 2020-04-06

## 2020-04-06 DIAGNOSIS — D73.5 SPLENIC INFARCT: ICD-10-CM

## 2020-04-06 DIAGNOSIS — D68.62 LUPUS ANTICOAGULANT DISORDER (H): ICD-10-CM

## 2020-04-24 ENCOUNTER — COMMUNICATION - HEALTHEAST (OUTPATIENT)
Dept: ANTICOAGULATION | Facility: CLINIC | Age: 60
End: 2020-04-24

## 2020-04-24 ENCOUNTER — AMBULATORY - HEALTHEAST (OUTPATIENT)
Dept: LAB | Facility: CLINIC | Age: 60
End: 2020-04-24

## 2020-04-24 DIAGNOSIS — D73.5 SPLENIC INFARCT: ICD-10-CM

## 2020-04-24 DIAGNOSIS — D68.62 LUPUS ANTICOAGULANT DISORDER (H): ICD-10-CM

## 2020-04-24 LAB — INR PPP: 2.9 (ref 0.9–1.1)

## 2020-06-04 ENCOUNTER — AMBULATORY - HEALTHEAST (OUTPATIENT)
Dept: LAB | Facility: CLINIC | Age: 60
End: 2020-06-04

## 2020-06-04 ENCOUNTER — COMMUNICATION - HEALTHEAST (OUTPATIENT)
Dept: ANTICOAGULATION | Facility: CLINIC | Age: 60
End: 2020-06-04

## 2020-06-04 DIAGNOSIS — D68.62 LUPUS ANTICOAGULANT DISORDER (H): ICD-10-CM

## 2020-06-04 DIAGNOSIS — D73.5 SPLENIC INFARCT: ICD-10-CM

## 2020-06-04 LAB — INR PPP: 2.9 (ref 0.9–1.1)

## 2020-07-13 ENCOUNTER — AMBULATORY - HEALTHEAST (OUTPATIENT)
Dept: LAB | Facility: CLINIC | Age: 60
End: 2020-07-13

## 2020-07-13 ENCOUNTER — COMMUNICATION - HEALTHEAST (OUTPATIENT)
Dept: ANTICOAGULATION | Facility: CLINIC | Age: 60
End: 2020-07-13

## 2020-07-13 ENCOUNTER — COMMUNICATION - HEALTHEAST (OUTPATIENT)
Dept: FAMILY MEDICINE | Facility: CLINIC | Age: 60
End: 2020-07-13

## 2020-07-13 DIAGNOSIS — D73.5 SPLENIC INFARCT: ICD-10-CM

## 2020-07-13 DIAGNOSIS — D68.62 LUPUS ANTICOAGULANT DISORDER (H): ICD-10-CM

## 2020-07-13 LAB — INR PPP: 2.7 (ref 0.9–1.1)

## 2020-08-25 ENCOUNTER — RECORDS - HEALTHEAST (OUTPATIENT)
Dept: ADMINISTRATIVE | Facility: OTHER | Age: 60
End: 2020-08-25

## 2020-09-08 ENCOUNTER — COMMUNICATION - HEALTHEAST (OUTPATIENT)
Dept: ANTICOAGULATION | Facility: CLINIC | Age: 60
End: 2020-09-08

## 2020-09-08 ENCOUNTER — AMBULATORY - HEALTHEAST (OUTPATIENT)
Dept: LAB | Facility: CLINIC | Age: 60
End: 2020-09-08

## 2020-09-08 DIAGNOSIS — D73.5 SPLENIC INFARCT: ICD-10-CM

## 2020-09-08 DIAGNOSIS — D68.62 LUPUS ANTICOAGULANT DISORDER (H): ICD-10-CM

## 2020-09-08 LAB — INR PPP: 2.4 (ref 0.9–1.1)

## 2020-10-15 ENCOUNTER — AMBULATORY - HEALTHEAST (OUTPATIENT)
Dept: LAB | Facility: CLINIC | Age: 60
End: 2020-10-15

## 2020-10-15 ENCOUNTER — COMMUNICATION - HEALTHEAST (OUTPATIENT)
Dept: ANTICOAGULATION | Facility: CLINIC | Age: 60
End: 2020-10-15

## 2020-10-15 DIAGNOSIS — D68.62 LUPUS ANTICOAGULANT DISORDER (H): ICD-10-CM

## 2020-10-15 DIAGNOSIS — D73.5 SPLENIC INFARCT: ICD-10-CM

## 2020-10-15 LAB — INR PPP: 3.3 (ref 0.9–1.1)

## 2020-10-29 ENCOUNTER — AMBULATORY - HEALTHEAST (OUTPATIENT)
Dept: LAB | Facility: CLINIC | Age: 60
End: 2020-10-29

## 2020-10-29 ENCOUNTER — COMMUNICATION - HEALTHEAST (OUTPATIENT)
Dept: ANTICOAGULATION | Facility: CLINIC | Age: 60
End: 2020-10-29

## 2020-10-29 DIAGNOSIS — D73.5 SPLENIC INFARCT: ICD-10-CM

## 2020-10-29 DIAGNOSIS — D68.62 LUPUS ANTICOAGULANT DISORDER (H): ICD-10-CM

## 2020-10-29 LAB — INR PPP: 2.1 (ref 0.9–1.1)

## 2020-11-10 ENCOUNTER — COMMUNICATION - HEALTHEAST (OUTPATIENT)
Dept: ANTICOAGULATION | Facility: CLINIC | Age: 60
End: 2020-11-10

## 2020-11-10 ENCOUNTER — AMBULATORY - HEALTHEAST (OUTPATIENT)
Dept: LAB | Facility: CLINIC | Age: 60
End: 2020-11-10

## 2020-11-10 DIAGNOSIS — D68.62 LUPUS ANTICOAGULANT DISORDER (H): ICD-10-CM

## 2020-11-10 DIAGNOSIS — D73.5 SPLENIC INFARCT: ICD-10-CM

## 2020-11-10 LAB — INR PPP: 2.7 (ref 0.9–1.1)

## 2020-12-10 ENCOUNTER — AMBULATORY - HEALTHEAST (OUTPATIENT)
Dept: LAB | Facility: CLINIC | Age: 60
End: 2020-12-10

## 2020-12-10 ENCOUNTER — COMMUNICATION - HEALTHEAST (OUTPATIENT)
Dept: ANTICOAGULATION | Facility: CLINIC | Age: 60
End: 2020-12-10

## 2020-12-10 DIAGNOSIS — D73.5 SPLENIC INFARCT: ICD-10-CM

## 2020-12-10 DIAGNOSIS — D68.62 LUPUS ANTICOAGULANT DISORDER (H): ICD-10-CM

## 2020-12-10 LAB — INR PPP: 2.5 (ref 0.9–1.1)

## 2020-12-24 ENCOUNTER — OFFICE VISIT - HEALTHEAST (OUTPATIENT)
Dept: FAMILY MEDICINE | Facility: CLINIC | Age: 60
End: 2020-12-24

## 2020-12-24 DIAGNOSIS — H69.93 DYSFUNCTION OF BOTH EUSTACHIAN TUBES: ICD-10-CM

## 2020-12-24 DIAGNOSIS — H92.01 OTALGIA, RIGHT: ICD-10-CM

## 2020-12-24 ASSESSMENT — MIFFLIN-ST. JEOR: SCORE: 1450.49

## 2021-01-07 ENCOUNTER — AMBULATORY - HEALTHEAST (OUTPATIENT)
Dept: LAB | Facility: CLINIC | Age: 61
End: 2021-01-07

## 2021-01-07 ENCOUNTER — COMMUNICATION - HEALTHEAST (OUTPATIENT)
Dept: ANTICOAGULATION | Facility: CLINIC | Age: 61
End: 2021-01-07

## 2021-01-07 DIAGNOSIS — D73.5 SPLENIC INFARCT: ICD-10-CM

## 2021-01-07 DIAGNOSIS — D68.62 LUPUS ANTICOAGULANT DISORDER (H): ICD-10-CM

## 2021-01-07 LAB — INR PPP: 2.9 (ref 0.9–1.1)

## 2021-01-19 ENCOUNTER — COMMUNICATION - HEALTHEAST (OUTPATIENT)
Dept: ANTICOAGULATION | Facility: CLINIC | Age: 61
End: 2021-01-19

## 2021-01-19 ENCOUNTER — AMBULATORY - HEALTHEAST (OUTPATIENT)
Dept: LAB | Facility: CLINIC | Age: 61
End: 2021-01-19

## 2021-01-19 DIAGNOSIS — D73.5 SPLENIC INFARCT: ICD-10-CM

## 2021-01-19 DIAGNOSIS — D68.62 LUPUS ANTICOAGULANT DISORDER (H): ICD-10-CM

## 2021-01-19 LAB — INR PPP: 3.1 (ref 0.9–1.1)

## 2021-01-22 ENCOUNTER — COMMUNICATION - HEALTHEAST (OUTPATIENT)
Dept: FAMILY MEDICINE | Facility: CLINIC | Age: 61
End: 2021-01-22

## 2021-01-26 ENCOUNTER — COMMUNICATION - HEALTHEAST (OUTPATIENT)
Dept: ANTICOAGULATION | Facility: CLINIC | Age: 61
End: 2021-01-26

## 2021-01-26 ENCOUNTER — AMBULATORY - HEALTHEAST (OUTPATIENT)
Dept: LAB | Facility: CLINIC | Age: 61
End: 2021-01-26

## 2021-01-26 DIAGNOSIS — D68.62 LUPUS ANTICOAGULANT DISORDER (H): ICD-10-CM

## 2021-01-26 DIAGNOSIS — D73.5 SPLENIC INFARCT: ICD-10-CM

## 2021-01-26 LAB — INR PPP: 2.2 (ref 0.9–1.1)

## 2021-02-12 ENCOUNTER — AMBULATORY - HEALTHEAST (OUTPATIENT)
Dept: LAB | Facility: CLINIC | Age: 61
End: 2021-02-12

## 2021-02-12 ENCOUNTER — COMMUNICATION - HEALTHEAST (OUTPATIENT)
Dept: ANTICOAGULATION | Facility: CLINIC | Age: 61
End: 2021-02-12

## 2021-02-12 DIAGNOSIS — D68.62 LUPUS ANTICOAGULANT DISORDER (H): ICD-10-CM

## 2021-02-12 DIAGNOSIS — D73.5 SPLENIC INFARCT: ICD-10-CM

## 2021-02-12 LAB — INR PPP: 2.5 (ref 0.9–1.1)

## 2021-03-01 ENCOUNTER — COMMUNICATION - HEALTHEAST (OUTPATIENT)
Dept: ANTICOAGULATION | Facility: CLINIC | Age: 61
End: 2021-03-01

## 2021-03-16 ENCOUNTER — COMMUNICATION - HEALTHEAST (OUTPATIENT)
Dept: ANTICOAGULATION | Facility: CLINIC | Age: 61
End: 2021-03-16

## 2021-03-16 ENCOUNTER — AMBULATORY - HEALTHEAST (OUTPATIENT)
Dept: LAB | Facility: CLINIC | Age: 61
End: 2021-03-16

## 2021-03-16 DIAGNOSIS — D73.5 SPLENIC INFARCT: ICD-10-CM

## 2021-03-16 DIAGNOSIS — D68.62 LUPUS ANTICOAGULANT DISORDER (H): ICD-10-CM

## 2021-03-16 LAB — INR PPP: 2.8 (ref 0.9–1.1)

## 2021-04-21 ENCOUNTER — COMMUNICATION - HEALTHEAST (OUTPATIENT)
Dept: LAB | Facility: CLINIC | Age: 61
End: 2021-04-21

## 2021-04-21 DIAGNOSIS — D73.5 SPLENIC INFARCT: ICD-10-CM

## 2021-04-30 ENCOUNTER — AMBULATORY - HEALTHEAST (OUTPATIENT)
Dept: LAB | Facility: CLINIC | Age: 61
End: 2021-04-30

## 2021-04-30 ENCOUNTER — COMMUNICATION - HEALTHEAST (OUTPATIENT)
Dept: ANTICOAGULATION | Facility: CLINIC | Age: 61
End: 2021-04-30

## 2021-04-30 DIAGNOSIS — D73.5 SPLENIC INFARCT: ICD-10-CM

## 2021-04-30 LAB — INR PPP: 2.5 (ref 0.9–1.1)

## 2021-05-26 ENCOUNTER — RECORDS - HEALTHEAST (OUTPATIENT)
Dept: ADMINISTRATIVE | Facility: CLINIC | Age: 61
End: 2021-05-26

## 2021-05-28 ENCOUNTER — AMBULATORY - HEALTHEAST (OUTPATIENT)
Dept: LAB | Facility: CLINIC | Age: 61
End: 2021-05-28

## 2021-05-28 ENCOUNTER — COMMUNICATION - HEALTHEAST (OUTPATIENT)
Dept: ANTICOAGULATION | Facility: CLINIC | Age: 61
End: 2021-05-28

## 2021-05-28 DIAGNOSIS — D73.5 SPLENIC INFARCT: ICD-10-CM

## 2021-05-28 LAB — INR PPP: 3.2 (ref 0.9–1.1)

## 2021-05-28 NOTE — TELEPHONE ENCOUNTER
Anticoagulation Annual Referral Renewal Review    Silvia Martinez's chart reviewed for annual renewal of referral to anticoagulation monitoring.        Criteria for anticoagulation nurse and/or pharmacist renewal met   Warfarin indication: Splenic infarction and Lupus anticoagulant disorder Yes, per indication   Current with INR monitoring/compliant Yes Yes   Date of last office visit 12/7/2018 Yes, had office visit within last year   Time in Therapeutic Range (TTR) 85.81 % Yes, TTR > 60%       Silvia DESTIN Martinez met all criteria for anticoagulation management program initiated renewal.  New INR standing orders and anticoagulation referral renewal placed.      Maite Johnson, RN  1:39 PM

## 2021-05-28 NOTE — TELEPHONE ENCOUNTER
ANTICOAGULATION  MANAGEMENT    Assessment     Today's INR result of 2.40 is Therapeutic (goal INR of 2.0-3.0)        Warfarin taken as previously instructed    No new diet changes affecting INR    No new medication/supplements affecting INR    Continues to tolerate warfarin with no reported s/s of bleeding or thromboembolism     Previous INR was Therapeutic    Plan:     Spoke with Silvia regarding INR result and instructed:     Warfarin Dosing Instructions:  Continue current warfarin dose 7.5 mg daily on SUN / WED; and 5 mg daily rest of week  (0 % change)    Instructed patient to follow up no later than: 4 weeks    Education provided: importance of consistent vitamin K intake and importance of notifying clinic for changes in medications    Silvia verbalizes understanding and agrees to warfarin dosing plan.    Instructed to call the [facility] for any changes, questions or concerns. (#[phone])   ?   Maite Johnson RN    Subjective/Objective:      Silvia Martinez, a 58 y.o. female is on warfarin.     Silvia reports:     Home warfarin dose: as updated on anticoagulation calendar per template     Missed doses: No     Medication changes:  No     S/S of bleeding or thromboembolism:  No     New Injury or illness:  No     Changes in diet or alcohol consumption:  No     Upcoming surgery, procedure or cardioversion:  Yes: Colonography 5/9/19-no changes to Warfarin needed    Anticoagulation Episode Summary     Current INR goal:   2.0-3.0   TTR:   87.6 % (4.3 y)   Next INR check:   5/24/2019   INR from last check:   2.40 (4/26/2019)   Weekly max warfarin dose:      Target end date:   Indefinite   INR check location:      Preferred lab:      Send INR reminders to:   ANTICOAGULATION POOL C (DTN,VAD,CGR,GAV)    Indications    Splenic infarct [D73.5]           Comments:            Anticoagulation Care Providers     Provider Role Specialty Phone number    Jose Ramon Bolton MD Referring Family Medicine [phone]    Chris Pan,  MD [facility] Medicine [phone]

## 2021-05-28 NOTE — TELEPHONE ENCOUNTER
Please ask [facility] to determine.  I don't think a colonography would require but I will leave it to them.

## 2021-05-28 NOTE — TELEPHONE ENCOUNTER
Spoke with Silvia.  She will contact [facility] and ask about instructions regarding her Warfarin medication. If she does end up needed to make any changes to her regimen she will call the [facility] back to update.    Maite Johnson RN, [facility] Anticoagulation Nurse  [phone]

## 2021-05-28 NOTE — TELEPHONE ENCOUNTER
Silvia checked with [facility] and no changes are needed for her upcoming procedure.    Maite Johnson, RN, [facility] Anticoagulation Nurse  [phone]

## 2021-05-28 NOTE — TELEPHONE ENCOUNTER
FYI - Status Update  Who is Calling: Patient  Update:  Im having a colonography on 5/9 at [place]. My prep starts 5/8 . there are 3 medications I need to take for the prep  1. Moviprep kit  2. Tagitol V 40%  3. Omnipaque 140Mg per ML solution   Im just wondering what I need to do for my INR and do I need to get tested more then my normal testing while on these 3 medications    Okay to leave a detailed message?:  Yes patient requesting all directions be left in a message as she can not answer her phone at work

## 2021-05-28 NOTE — TELEPHONE ENCOUNTER
Dr. Pan,     Patient is scheduled for Colonography on 5/9/19 at [place].    Would you like the patient to HOLD her Warfarin for the procedure / how many day hold?    Do you want the patient to bridge with Lovenox while off of Warfarin?    ACN looked in [facility] for possible medication interactions and none were listed for the medications named below.    Please advise regarding orders, thank you.    Maite Johnson RN, [facility] Anticoagulation Nurse  [phone]

## 2021-05-29 NOTE — TELEPHONE ENCOUNTER
ANTICOAGULATION  MANAGEMENT    Assessment     Today's INR result of 2.70 is Therapeutic (goal INR of 2.0-3.0)        Warfarin taken as previously instructed    No new diet changes affecting INR    No new medication/supplements affecting INR    Continues to tolerate warfarin with no reported s/s of bleeding or thromboembolism     Previous INR was Therapeutic    Plan:     Left a detailed message for Silvia regarding INR result and instructed:     Warfarin Dosing Instructions:  Continue current warfarin dose 7.5 mg daily on SUN / WED; and 5 mg daily rest of week  (0 % change)    Instructed patient to follow up no later than: 4-6 weeks    Education provided: importance of consistent vitamin K intake and importance of notifying clinic for changes in medications    Instructed to call the [facility] for any changes, questions or concerns. (#[phone])   ?   Maite Johnson RN    Subjective/Objective:      Silvia Martinez, a 59 y.o. female is on warfarin.     Silvia reports:     Home warfarin dose: as updated on anticoagulation calendar per template     Missed doses: No     Medication changes:  No     S/S of bleeding or thromboembolism:  No     New Injury or illness:  No     Changes in diet or alcohol consumption:  No     Upcoming surgery, procedure or cardioversion:  No    Anticoagulation Episode Summary     Current INR goal:   2.0-3.0   TTR:   87.9 % (4.4 y)   Next INR check:   7/9/2019   INR from last check:   2.70 (5/28/2019)   Weekly max warfarin dose:      Target end date:   Indefinite   INR check location:      Preferred lab:      Send INR reminders to:   [facility]    Indications    Splenic infarct [D73.5]           Comments:            Anticoagulation Care Providers     Provider Role Specialty Phone number    Jose Ramon Bolton MD Referring Family Medicine [phone]    Chris Pan MD Responsible Family Medicine [phone]

## 2021-05-30 ENCOUNTER — RECORDS - HEALTHEAST (OUTPATIENT)
Dept: ADMINISTRATIVE | Facility: CLINIC | Age: 61
End: 2021-05-30

## 2021-05-30 VITALS — WEIGHT: 197.2 LBS | BODY MASS INDEX: 31.12 KG/M2

## 2021-05-30 VITALS — HEIGHT: 67 IN | BODY MASS INDEX: 30.83 KG/M2 | WEIGHT: 196.44 LBS

## 2021-05-30 NOTE — TELEPHONE ENCOUNTER
ANTICOAGULATION  MANAGEMENT    Assessment     Today's INR result of 2.8 is Therapeutic (goal INR of 2.0-3.0)        Warfarin taken as previously instructed    No new diet changes affecting INR    No new medication/supplements affecting INR    Continues to tolerate warfarin with no reported s/s of bleeding or thromboembolism     Previous INR was Therapeutic for ~6 months    Plan:     Left a detailed message for Silvia regarding INR result and instructed:     Warfarin Dosing Instructions:  Continue current warfarin dose 7.5 mg daily on Sun/Wed; and 5 mg daily rest of week      Instructed patient to follow up no later than: 6-8 weeks    Education provided: importance of consistent vitamin K intake, target INR goal and significance of current INR result, importance of notifying clinic for changes in medications, importance of notifying clinic for diarrhea, nausea/vomiting, reduced intake and/or illness and importance of notifying clinic of upcoming surgeries and procedures 2 weeks in advance        Instructed to call the [facility] for any changes, questions or concerns. (#[phone])   ?   Tara Forbes RN    Subjective/Objective:      Silvia Patelmichelle, a 59 y.o. female is on warfarin.     Silvia reports:     Home warfarin dose: as updated on anticoagulation calendar per template     Missed doses: No     Medication changes:  No     S/S of bleeding or thromboembolism:  No     New Injury or illness:  No     Changes in diet or alcohol consumption:  No     Upcoming surgery, procedure or cardioversion:  No    Anticoagulation Episode Summary     Current INR goal:   2.0-3.0   TTR:   88.2 % (4.5 y)   Next INR check:   9/2/2019   INR from last check:   2.80 (7/8/2019)   Weekly max warfarin dose:      Target end date:   Indefinite   INR check location:      Preferred lab:      Send INR reminders to:   [facility]    Indications    Splenic infarct [D73.5]           Comments:            Anticoagulation Care Providers      Provider Role Specialty Phone number    Jose Ramon Bolton MD Referring Family Medicine [phone]    Chris Pan MD Responsible Family Medicine [phone]

## 2021-05-31 VITALS — WEIGHT: 202 LBS | BODY MASS INDEX: 31.88 KG/M2

## 2021-05-31 VITALS — WEIGHT: 202.25 LBS | BODY MASS INDEX: 31.74 KG/M2 | HEIGHT: 67 IN

## 2021-05-31 NOTE — PROGRESS NOTES
Chief Complaint   Patient presents with     Sinus Problem     Pt c/o R side pressure and R teeth hurt, no drainage, stuffiness, off and on HA       HPI: 59-year-old female presents today with right upper tooth pain and right maxillary facial pressure for the past several days in duration of mild intensity.  This is in the context of having had sinus infections in the past.  She also has mild allergies and is taken some of her 's allergy medication with good results.    ROS: No rhinorrhea no fever no cough, no vomiting    SH:    reports that she has never smoked. She has never used smokeless tobacco. She reports that she does not drink alcohol or use drugs.      FH: The Patient's family history includes Asthma in her mother; Breast cancer in her cousin; Cancer in her maternal grandmother; Hypertension in her father and mother; Osteoporosis in her father and mother; Thyroid disease in her mother.     Meds:  Silvia has a current medication list which includes the following prescription(s): cholecalciferol (vitamin d3), estradiol, levothyroxine, UNABLE TO FIND, UNABLE TO FIND, warfarin, and warfarin.    O:  Pulse 79   Temp 98.5  F (36.9  C)   Resp 16   Wt 194 lb 7 oz (88.2 kg)   SpO2 98%   BMI 30.68 kg/m     Constitutional:    --Vitals as above  --No acute distress  Eyes-  --Sclera noninjected  --Lids and conjunctiva normal  ENT-  --TMs clear  --Sclera noninjected  --Pharynx not erythematous  --Mild right maxillary facial pain to palpation  Neck-  --Neck supple    Lymph-  --No cervical lymphadenopathy  Lungs-  --Clear to Auscultation  Heart-  --Regular rate and rhythm  Skin-  --Pink and dry          A/P:   1. Acute non-recurrent sinusitis, unspecified location  I think she most likely has a sinusitis.  We discussed treatments and at this time would like to hold on antibiotics.  We discussed sinus irrigation, Lyric pot, and if not improving in the next 3 to 5 days would consider calling in  antibiotics.

## 2021-05-31 NOTE — TELEPHONE ENCOUNTER
Called and spoke with Silvia.     - reported for her sinus, she is taking OTC Coricidin HBP (which contains 325mg of Acetaminophen) is OK to take with her warfarin.     - anything greater than 1500mg Acetaminiophen, can increase risk for bleeding.      - she reported not taking any other Acetaminophen products.

## 2021-05-31 NOTE — TELEPHONE ENCOUNTER
Who is calling:  Patient  Reason for Call:  Patient was calling to speak with the nurse about the medication she has been taking for her sinus pressure. Patient said that she started taking it last Thursday and wants to make sure that it will not interfere with her warfarin. Please advise.    Date of last appointment with primary care: n/a  Okay to leave a detailed message: Yes

## 2021-05-31 NOTE — TELEPHONE ENCOUNTER
ANTICOAGULATION  MANAGEMENT    Assessment     Today's INR result of 3.00 is Therapeutic (goal INR of 2.0-3.0)        Warfarin taken as previously instructed    No new diet changes affecting INR    Interaction between Coricidin and warfarin may be affecting INR    Continues to tolerate warfarin with no reported s/s of bleeding or thromboembolism     Previous INR was Supratherapeutic    Plan:     Spoke with Silvia regarding INR result and instructed:     Warfarin Dosing Instructions:  Continue current warfarin dose 7.5 mg daily on SUN / WED; and 5 mg daily rest of week  (0 % change)    Instructed patient to follow up no later than: 2 weeks    Education provided: target INR goal and significance of current INR result and potential interaction between warfarin and Coricidin / APAP    Silvia verbalizes understanding and agrees to warfarin dosing plan.    Instructed to call the [facility] for any changes, questions or concerns. (#[phone])   ?   Maite Johnson RN    Subjective/Objective:      Silvia Martinez, a 59 y.o. female is on warfarin.     Silvia reports:     Home warfarin dose: as updated on anticoagulation calendar per template     Missed doses: No     Medication changes:  Yes: Coricidin as needed     S/S of bleeding or thromboembolism:  No     New Injury or illness:  No     Changes in diet or alcohol consumption:  No     Upcoming surgery, procedure or cardioversion:  No    Anticoagulation Episode Summary     Current INR goal:   2.0-3.0   TTR:   86.2 % (4.6 y)   Next INR check:   9/9/2019   INR from last check:   3.00 (8/26/2019)   Weekly max warfarin dose:      Target end date:   Indefinite   INR check location:      Preferred lab:      Send INR reminders to:   [facility]    Indications    Splenic infarct [D73.5]           Comments:            Anticoagulation Care Providers     Provider Role Specialty Phone number    Jose Ramon Bolton MD Referring Family Medicine [phone]    Chris Pan MD  [facility] Medicine [phone]

## 2021-05-31 NOTE — TELEPHONE ENCOUNTER
ANTICOAGULATION  MANAGEMENT    Assessment     Today's INR result of 3.5 is Supratherapeutic (goal INR of 2.0-3.0)        Warfarin taken as previously instructed    Decreased greens/vitamin K intake may be affecting INR- will get back to normal greens.     Interaction between Coricidin and warfarin may be affecting INR- started taking one pill a day last Thurs for sinus congestion. Pt will start backing off from it now that she feels better.     Continues to tolerate warfarin with no reported s/s of bleeding or thromboembolism     Previous INR was Therapeutic    Plan:     Spoke with Silvia regarding INR result and instructed:     Warfarin Dosing Instructions:  Take one time lower dose of 5 mg today only then continue current warfarin dose    7.5 mg every Sun, Wed; 5 mg all other days         Instructed patient to follow up no later than: 2 weeks.    Education provided: importance of consistent vitamin K intake and potential interaction between warfarin and Coricidin    Silvia verbalizes understanding and agrees to warfarin dosing plan.    Instructed to call the [facility] for any changes, questions or concerns. (#[phone])   ?   Moises Duque RN    Subjective/Objective:      Silvia Martinez, a 59 y.o. female is on warfarin.     Silvia reports:     Home warfarin dose: verbally confirmed home dose with pt and updated on anticoagulation calendar     Missed doses: No     Medication changes:  Yes, was taking Coricidin     S/S of bleeding or thromboembolism:  No     New Injury or illness:  Yes, sinus congestion     Changes in diet or alcohol consumption:  Yes, had less greens.      Upcoming surgery, procedure or cardioversion:  No    Anticoagulation Episode Summary     Current INR goal:   2.0-3.0   TTR:   86.9 % (4.6 y)   Next INR check:   8/28/2019   INR from last check:   3.50! (8/14/2019)   Weekly max warfarin dose:      Target end date:   Indefinite   INR check location:      Preferred lab:      Send INR reminders to:    [facility]    Indications    Splenic infarct [D73.5]           Comments:            Anticoagulation Care Providers     Provider Role Specialty Phone number    Jose Ramon Bolton MD Referring Family Medicine [phone]    Chris Pan MD Responsible Family Medicine [phone]

## 2021-05-31 NOTE — TELEPHONE ENCOUNTER
Left message for Silvia requesting that she call back [facility] on 8/13/19.    ACN could follow up 8/13/19.

## 2021-06-01 ENCOUNTER — RECORDS - HEALTHEAST (OUTPATIENT)
Dept: ADMINISTRATIVE | Facility: CLINIC | Age: 61
End: 2021-06-01

## 2021-06-01 VITALS — HEIGHT: 67 IN | WEIGHT: 199.5 LBS | BODY MASS INDEX: 31.31 KG/M2

## 2021-06-01 NOTE — TELEPHONE ENCOUNTER
ANTICOAGULATION  MANAGEMENT    Assessment     Today's INR result of 2.7 is Therapeutic (goal INR of 2.0-3.0)        Warfarin taken as previously instructed    Decreased greens/vitamin K intake may be affecting INR    No new medication/supplements affecting INR    Continues to tolerate warfarin with no reported s/s of bleeding or thromboembolism     Previous INR was Therapeutic    Plan:     Spoke with Silvia regarding INR result and instructed:     Warfarin Dosing Instructions:  Continue current warfarin dose 7.5 mg daily on Sundays and Wednesdays; and 5 mg daily rest of week  (0 % change)    Instructed patient to follow up no later than: one month.    Education provided: importance of therapeutic range, importance of following up for INR monitoring at instructed interval and importance of taking warfarin as instructed    Silvia verbalizes understanding and agrees to warfarin dosing plan.    Instructed to call the [facility] for any changes, questions or concerns. (#[phone])   ?   Mechelle Cox RN    Subjective/Objective:      Silvia Martinez, a 59 y.o. female is on warfarin.     Silvia reports:     Home warfarin dose: verbally confirmed home dose with Silvia and updated on anticoagulation calendar     Missed doses: No     Medication changes:  No     S/S of bleeding or thromboembolism:  No     New Injury or illness:  No     Changes in diet or alcohol consumption:  Yes: a little off due to her mother in the hospital for five days.     Upcoming surgery, procedure or cardioversion:  No    Anticoagulation Episode Summary     Current INR goal:   2.0-3.0   TTR:   82.7 %   Next INR check:   10/10/2019   INR from last check:   2.70 (9/12/2019)   Weekly max warfarin dose:      Target end date:   Indefinite   INR check location:      Preferred lab:      Send INR reminders to:   [facility]    Indications    Splenic infarct [D73.5]           Comments:            Anticoagulation Care Providers     Provider Role  Specialty Phone number    Jose Ramon Bolton MD Referring Family Medicine [phone]    Chris Pan MD Responsible Family Medicine [phone]

## 2021-06-02 VITALS — HEIGHT: 67 IN | BODY MASS INDEX: 29.86 KG/M2 | WEIGHT: 190.25 LBS

## 2021-06-02 NOTE — TELEPHONE ENCOUNTER
ANTICOAGULATION  MANAGEMENT    Assessment     Today's INR result of 3.0 is Therapeutic (goal INR of 2.0-3.0)        Warfarin taken as previously instructed    No new diet changes affecting INR    No new medication/supplements affecting INR    Continues to tolerate warfarin with no reported s/s of bleeding or thromboembolism     Previous INR was Supratherapeutic    Plan:     Spoke with Silvia regarding INR result and instructed:     Warfarin Dosing Instructions:  Continue current warfarin dose    7.5 mg every Sun, Wed; 5 mg all other days         Instructed patient to follow up no later than: 2 weeks.    Education provided: importance of taking warfarin as instructed    Silvia verbalizes understanding and agrees to warfarin dosing plan.    Instructed to call the [facility] for any changes, questions or concerns. (#[phone])   ?   Moises Duque RN    Subjective/Objective:      Silvia Martinez, a 59 y.o. female is on warfarin.     Silvia reports:     Home warfarin dose: verbally confirmed home dose with pt and updated on anticoagulation calendar     Missed doses: No     Medication changes:  No     S/S of bleeding or thromboembolism:  No     New Injury or illness:  No     Changes in diet or alcohol consumption:  No     Upcoming surgery, procedure or cardioversion:  No    Anticoagulation Episode Summary     Current INR goal:   2.0-3.0   TTR:   77.7 %   Next INR check:   11/8/2019   INR from last check:   3.00 (10/25/2019)   Weekly max warfarin dose:      Target end date:   Indefinite   INR check location:      Preferred lab:      Send INR reminders to:   [facility]    Indications    Splenic infarct [D73.5]           Comments:            Anticoagulation Care Providers     Provider Role Specialty Phone number    Jose Ramon Bolton MD Referring Family Medicine [phone]    Chris Pan MD Responsible Family Medicine [phone]

## 2021-06-02 NOTE — TELEPHONE ENCOUNTER
ANTICOAGULATION  MANAGEMENT    Assessment     Today's INR result of 3.3 is Supratherapeutic (goal INR of 2.0-3.0)        Warfarin taken as previously instructed    No new diet changes affecting INR    Taking some OTC medications for allergy's     Continues to tolerate warfarin with no reported s/s of bleeding or thromboembolism     Previous INR was Therapeutic    Plan:     Spoke with Silvia regarding INR result and instructed:     Warfarin Dosing Instructions:  Continue current warfarin dose 7.5 mg daily on Sundays and Wednesdays; and 5 mg daily rest of week  (0 % change)    Instructed patient to follow up no later than: two weeks    Education provided: importance of consistent vitamin K intake, impact of vitamin K foods on INR, importance of therapeutic range, importance of following up for INR monitoring at instructed interval and importance of taking warfarin as instructed    Silvia verbalizes understanding and agrees to warfarin dosing plan.    Instructed to call the [facility] for any changes, questions or concerns. (#[phone])   ?   Mechelle Cox RN    Subjective/Objective:      Silvia WEIR Juan, a 59 y.o. female is on warfarin.     Silvia reports:     Home warfarin dose: verbally confirmed home dose with Silvia and updated on anticoagulation calendar     Missed doses: No     Medication changes:  Yes: has been taking more Tylenol and allergy medications. But is now starting to take less.      S/S of bleeding or thromboembolism:  No     New Injury or illness:  Yes- allergy's      Changes in diet or alcohol consumption:  No-she will eat a salad today.     Upcoming surgery, procedure or cardioversion:  No    Anticoagulation Episode Summary     Current INR goal:   2.0-3.0   TTR:   81.5 %   Next INR check:   10/25/2019   INR from last check:   3.30! (10/11/2019)   Weekly max warfarin dose:      Target end date:   Indefinite   INR check location:      Preferred lab:      Send INR reminders to:   [facility]    Indications    Splenic infarct [D73.5]           Comments:            Anticoagulation Care Providers     Provider Role Specialty Phone number    Jose Ramon Bolton MD Referring Family Medicine [phone]    Chris Pan MD Responsible Family Medicine [phone]

## 2021-06-03 VITALS — BODY MASS INDEX: 30.68 KG/M2 | WEIGHT: 194.44 LBS

## 2021-06-03 NOTE — TELEPHONE ENCOUNTER
ANTICOAGULATION  MANAGEMENT    Assessment     Today's INR result of 3.2 is Supratherapeutic (goal INR of 2.0-3.0)        Warfarin taken as previously instructed    Decreased greens/vitamin K intake may be affecting INR- will get back to normal greens.     No new medication/supplements affecting INR    Continues to tolerate warfarin with no reported s/s of bleeding or thromboembolism     Previous INR was Therapeutic    Plan:     Spoke with Silvia regarding INR result and instructed:     Warfarin Dosing Instructions:  Continue current warfarin dose    7.5 mg every Sun, Wed; 5 mg all other days     Pt agreed to have one extra serving of greens today.    Instructed patient to follow up no later than: 2 weeks. Appt is made for 11/22.     Education provided: importance of consistent vitamin K intake and importance of taking warfarin as instructed    Silvia verbalizes understanding and agrees to warfarin dosing plan.    Instructed to call the [facility] for any changes, questions or concerns. (#[phone])   ?   Moises Duque RN    Subjective/Objective:      Silvia Martinez, a 59 y.o. female is on warfarin.     Silvia reports:     Home warfarin dose: verbally confirmed home dose with Silvia and updated on anticoagulation calendar     Missed doses: No     Medication changes:  No     S/S of bleeding or thromboembolism:  No     New Injury or illness:  No     Changes in diet or alcohol consumption:  Yes: decreased greens.     Upcoming surgery, procedure or cardioversion:  No    Anticoagulation Episode Summary     Current INR goal:   2.0-3.0   TTR:   73.9 %   Next INR check:   11/22/2019   INR from last check:   3.20! (11/8/2019)   Weekly max warfarin dose:      Target end date:   Indefinite   INR check location:      Preferred lab:      Send INR reminders to:   [facility]    Indications    Splenic infarct [D73.5]           Comments:            Anticoagulation Care Providers     Provider Role Specialty Phone number     Jose Ramon Bolton MD [facility] [phone]    Chris Pan MD Responsible Family Medicine [phone]

## 2021-06-03 NOTE — TELEPHONE ENCOUNTER
ANTICOAGULATION  MANAGEMENT    Assessment     Today's INR result of 3.1 is Supratherapeutic (goal INR of 2.0-3.0)        Warfarin taken as previously instructed    Change in alcohol intake may be affecting INR    she took Omeprazole a few time this past week while on vacation.    Continues to tolerate warfarin with no reported s/s of bleeding or thromboembolism     Previous INR was Supratherapeutic    Plan:     Spoke with Silvia regarding INR result and instructed:     Warfarin Dosing Instructions:  Continue current warfarin dose 7.5 mg daily on Sundays and Wednesdays; and 5 mg daily rest of week  (0 % change)    Instructed patient to follow up no later than: two weeks.    Education provided: importance of consistent vitamin K intake, impact of vitamin K foods on INR, potential interaction between warfarin and alcohol, importance of therapeutic range, importance of following up for INR monitoring at instructed interval and importance of taking warfarin as instructed    Silvia verbalizes understanding and agrees to warfarin dosing plan.    Instructed to call the [facility] for any changes, questions or concerns. (#[phone])   ?   Mechelle Cox RN    Subjective/Objective:      Silvia Patelmichelle, a 59 y.o. female is on warfarin.     Silvia reports:     Home warfarin dose: verbally confirmed home dose with Silvia and updated on anticoagulation calendar     Missed doses: No     Medication changes:  Yes: she took a few Omeprazole this past week.     S/S of bleeding or thromboembolism:  No     New Injury or illness:  No     Changes in diet or alcohol consumption:  Yes: less greens and more alcohol while on vacation.     Upcoming surgery, procedure or cardioversion:  No    Anticoagulation Episode Summary     Current INR goal:   2.0-3.0   TTR:   70.0 % (1 y)   Next INR check:   12/6/2019   INR from last check:   3.10! (11/22/2019)   Weekly max warfarin dose:      Target end date:   Indefinite   INR check location:       Preferred lab:      Send INR reminders to:   [facility]    Indications    Splenic infarct [D73.5]           Comments:            Anticoagulation Care Providers     Provider Role Specialty Phone number    Jose Ramon Bolton MD Referring Family Medicine [phone]    Chris Pan MD Responsible Family Medicine [phone]

## 2021-06-04 NOTE — TELEPHONE ENCOUNTER
-Accuchecks   -will resume SSI as needed   -pt NPO s/p PEG placement      ANTICOAGULATION  MANAGEMENT    Assessment     Today's INR result of 2.5 is Therapeutic (goal INR of 2.0-3.0)        Warfarin taken as previously instructed    No new diet changes affecting INR    No new medication/supplements affecting INR    Continues to tolerate warfarin with no reported s/s of bleeding or thromboembolism     Previous INR was Therapeutic    Plan:     Left a detailed message for Silvia regarding INR result and instructed:     Warfarin Dosing Instructions:  Continue current warfarin dose    7.5 mg every Sun, Wed; 5 mg all other days         Instructed patient to follow up no later than: 1 month    Education provided: target INR goal and significance of current INR result, importance of notifying clinic for changes in medications and importance of notifying clinic for diarrhea, nausea/vomiting, reduced intake and/or illness        Instructed to call the [facility] for any changes, questions or concerns. (#[phone])   ?   Tara Forbes RN    Subjective/Objective:      Silvia Martinez, a 59 y.o. female is on warfarin.     Silvia reports:     Home warfarin dose: as updated on anticoagulation calendar per template     Missed doses: No     Medication changes:  No     S/S of bleeding or thromboembolism:  No     New Injury or illness:  Yes: cold symptoms     Changes in diet or alcohol consumption:  No     Upcoming surgery, procedure or cardioversion:  No    Anticoagulation Episode Summary     Current INR goal:   2.0-3.0   TTR:   71.7 % (1 y)   Next INR check:   1/21/2020   INR from last check:   2.50 (12/24/2019)   Weekly max warfarin dose:      Target end date:   Indefinite   INR check location:      Preferred lab:      Send INR reminders to:   [facility]    Indications    Splenic infarct [D73.5]           Comments:            Anticoagulation Care Providers     Provider Role Specialty Phone number    Jose Ramon Bolton MD Referring Family Medicine [phone]    Chris Pan MD  [facility] [phone]

## 2021-06-04 NOTE — TELEPHONE ENCOUNTER
ANTICOAGULATION  MANAGEMENT    Assessment     Today's INR result of 2.3 is Therapeutic (goal INR of 2.0-3.0)        Warfarin taken as previously instructed    Decreased greens/vitamin K intake may be affecting INR- pt thought she had fewer salads than usual. Will try to be consistent.     No new medication/supplements affecting INR    Continues to tolerate warfarin with no reported s/s of bleeding or thromboembolism     Previous INR was Supratherapeutic    Plan:     Spoke with Silvia regarding INR result and instructed:     Warfarin Dosing Instructions:  Continue current warfarin dose    7.5 mg every Sun, Wed; 5 mg all other days         Instructed patient to follow up no later than: 2 weeks.     Education provided: importance of consistent vitamin K intake, impact of vitamin K foods on INR and importance of taking warfarin as instructed    Silvia verbalizes understanding and agrees to warfarin dosing plan.    Instructed to call the [facility] for any changes, questions or concerns. (#[phone])   ?   Moises Duque RN    Subjective/Objective:      Silvia Martinez, a 59 y.o. female is on warfarin.     Silvia reports:     Home warfarin dose: verbally confirmed home dose with pt and updated on anticoagulation calendar     Missed doses: No     Medication changes:  No     S/S of bleeding or thromboembolism:  No     New Injury or illness:  No     Changes in diet or alcohol consumption:  Yes: fewer salads than usual.     Upcoming surgery, procedure or cardioversion:  No    Anticoagulation Episode Summary     Current INR goal:   2.0-3.0   TTR:   69.5 % (1 y)   Next INR check:   12/23/2019   INR from last check:   2.30 (12/9/2019)   Weekly max warfarin dose:      Target end date:   Indefinite   INR check location:      Preferred lab:      Send INR reminders to:   [facility]    Indications    Splenic infarct [D73.5]           Comments:            Anticoagulation Care Providers     Provider Role Specialty Phone  number    Jose Ramon Bolton MD [facility] [phone]    Chris Pan MD Responsible Family Medicine [phone]

## 2021-06-05 VITALS
SYSTOLIC BLOOD PRESSURE: 118 MMHG | HEIGHT: 67 IN | HEART RATE: 81 BPM | BODY MASS INDEX: 29.82 KG/M2 | WEIGHT: 190 LBS | TEMPERATURE: 98.6 F | DIASTOLIC BLOOD PRESSURE: 62 MMHG | OXYGEN SATURATION: 98 %

## 2021-06-05 NOTE — TELEPHONE ENCOUNTER
ANTICOAGULATION  MANAGEMENT    Assessment     Today's INR result of 2.8 is Therapeutic (goal INR of 2.0-3.0)        Warfarin taken as previously instructed    No new diet changes affecting INR    No new medication/supplements affecting INR    Continues to tolerate warfarin with no reported s/s of bleeding or thromboembolism     Previous INR was Supratherapeutic    Plan:     Spoke with Silvia regarding INR result and instructed:     Warfarin Dosing Instructions:  Continue current warfarin dose    7.5 mg every Sun, Wed; 5 mg all other days         Instructed patient to follow up no later than: 2 weeks.    Education provided: importance of taking warfarin as instructed    Silvia verbalizes understanding and agrees to warfarin dosing plan.    Instructed to call the [facility] for any changes, questions or concerns. (#[phone])   ?   Moises Duque RN    Subjective/Objective:      Silvia Martinez, a 59 y.o. female is on warfarin.     Silvia reports:     Home warfarin dose: verbally confirmed home dose with Silvia and updated on anticoagulation calendar     Missed doses: No     Medication changes:  No     S/S of bleeding or thromboembolism:  No     New Injury or illness:  No     Changes in diet or alcohol consumption:  No     Upcoming surgery, procedure or cardioversion:  No    Anticoagulation Episode Summary     Current INR goal:   2.0-3.0   TTR:   69.3 % (1 y)   Next INR check:   2/18/2020   INR from last check:   2.80 (2/4/2020)   Weekly max warfarin dose:      Target end date:   Indefinite   INR check location:      Preferred lab:      Send INR reminders to:   [facility]    Indications    Splenic infarct [D73.5]           Comments:            Anticoagulation Care Providers     Provider Role Specialty Phone number    Jose Ramon Bolton MD Referring Family Medicine [phone]    Chris Pan MD Responsible Family Medicine [phone]

## 2021-06-05 NOTE — TELEPHONE ENCOUNTER
Who is calling:  Patient Silvia  Reason for Call:  Silvia would like to know how to adjust her coumadin with her tylenol. Silvia explained that she is taking about 3000 mg of tylenol for a toothache.  Date of last appointment with primary care: 12-24-19  Okay to leave a detailed message: Yes

## 2021-06-05 NOTE — TELEPHONE ENCOUNTER
Message left that will not change warfarin dose with out INR result. Depending how long she has been taking the extra Tylenol she should come and get INR checked. She is due next week per note from last INR result. Return call if would like to discuss this more.

## 2021-06-05 NOTE — TELEPHONE ENCOUNTER
ANTICOAGULATION  MANAGEMENT    Assessment     Today's INR result of 3.2 is Supratherapeutic (goal INR of 2.0-3.0)        Warfarin taken as previously instructed    Decreased greens/vitamin K intake may be affecting INR    Interaction between Tylenol and warfarin may be affecting INR    Continues to tolerate warfarin with no reported s/s of bleeding or thromboembolism     Previous INR was Therapeutic    Plan:     Spoke with Silvia regarding INR result and instructed:     Warfarin Dosing Instructions:  Take 5 mg today and tomorrow then continue current warfarin dose 7.5 mg daily on Sundays and Wednesdays; and 5 mg daily rest of week  (0 % change)    Instructed patient to follow up no later than: two weeks.    Education provided: importance of consistent vitamin K intake, impact of vitamin K foods on INR, importance of therapeutic range, importance of following up for INR monitoring at instructed interval and importance of taking warfarin as instructed    Silvia verbalizes understanding and agrees to warfarin dosing plan.    Instructed to call the [facility] for any changes, questions or concerns. (#[phone])   ?   Mechelle Cox RN    Subjective/Objective:      Silvia Martinez, a 59 y.o. female is on warfarin.     Silvia reports:     Home warfarin dose: verbally confirmed home dose with Silvia and updated on anticoagulation calendar     Missed doses: No     Medication changes:  Yes: had taken Tylenol 3000 mg daily for about one week. Has now stopped a few days ago.      S/S of bleeding or thromboembolism:  No     New Injury or illness:  Yes: tooth ace with root canal. Finishes at dentist this Thursday.      Changes in diet or alcohol consumption:  Yes: less greens due to tooth ache.      Upcoming surgery, procedure or cardioversion:  No    Anticoagulation Episode Summary     Current INR goal:   2.0-3.0   TTR:   71.2 % (1 y)   Next INR check:   2/4/2020   INR from last check:   3.20! (1/21/2020)   Weekly max warfarin  dose:      Target end date:   Indefinite   INR check location:      Preferred lab:      Send INR reminders to:   [facility]    Indications    Splenic infarct [D73.5]           Comments:            Anticoagulation Care Providers     Provider Role Specialty Phone number    Jose Ramon Bolton MD Referring Family Medicine [phone]    Chris Pan MD Responsible [facility] [phone]

## 2021-06-06 NOTE — TELEPHONE ENCOUNTER
ANTICOAGULATION  MANAGEMENT    Assessment     Today's INR result of 2.4 is Therapeutic (goal INR of 2.0-3.0)        Warfarin taken as previously instructed    No new diet changes affecting INR    No new medication/supplements affecting INR    Continues to tolerate warfarin with no reported s/s of bleeding or thromboembolism     Previous INR was Therapeutic    Plan:     Left a detailed message for Silvia regarding INR result and instructed:     Warfarin Dosing Instructions:  Continue current warfarin dose 7.5 mg daily on Sundays and Wednesdays; and 5 mg daily rest of week  (0 % change)    Instructed patient to follow up no later than: 4 weeks     Education provided:     Instructed to call the [facility] for any changes, questions or concerns. (#[phone])   ?   Kesha Ronquillo RN    Subjective/Objective:      Silvia Martinez, a 59 y.o. female is on warfarin.     Silvia reports:     Home warfarin dose: verbally confirmed home dose with Silvia and updated on anticoagulation calendar     Missed doses: No     Medication changes:  No     S/S of bleeding or thromboembolism:  No     New Injury or illness:  No     Changes in diet or alcohol consumption:  No     Upcoming surgery, procedure or cardioversion:  No    Anticoagulation Episode Summary     Current INR goal:   2.0-3.0   TTR:   69.3 % (1 y)   Next INR check:   3/17/2020   INR from last check:   2.40 (2/18/2020)   Weekly max warfarin dose:      Target end date:   Indefinite   INR check location:      Preferred lab:      Send INR reminders to:   [facility]    Indications    Splenic infarct [D73.5]           Comments:            Anticoagulation Care Providers     Provider Role Specialty Phone number    Jose Ramon Bolton MD Referring Family Medicine [phone]    Chris Pan MD Responsible Family Medicine [phone]

## 2021-06-06 NOTE — TELEPHONE ENCOUNTER
Patient reports was recommended by opthalmologist. Writer informed patient Krill medication may have an affect on INR and increase risk of bleeding. Patient understood, encouraged to converse with PCP regarding these matters, if she is to start medication, to have her INR monitored more closely. Patient understood.

## 2021-06-06 NOTE — TELEPHONE ENCOUNTER
Who is calling:  [facility]  Reason for Call:  Patient needs to know if she needs to be off her warfin for a 3-snip punctoplasty which will take place on March 20th  Please call patient to advise  Date of last appointment with primary care: n/a  Okay to leave a detailed message: Yes

## 2021-06-06 NOTE — TELEPHONE ENCOUNTER
Routing to PCP to please advise on the below message.  Do you want patient to hold warfarin and if so, how many days? Bridging recommended?    Lab Results   Component Value Date    INR 2.50 (H) 03/12/2020    INR 2.40 (H) 02/18/2020    INR 2.80 (H) 02/04/2020

## 2021-06-06 NOTE — TELEPHONE ENCOUNTER
FYI - Status Update  Who is Calling: Patient  Update: Wanting to know if she was to use or take Krill oil if it would affect her INR. Please call the patient back.   Okay to leave a detailed message?:  Yes

## 2021-06-06 NOTE — TELEPHONE ENCOUNTER
Please call eye clinic and let them know that I am not familiar with this procedure but it would be best to remain on the warfarin if surgeon is comfortable with that.

## 2021-06-07 NOTE — TELEPHONE ENCOUNTER
Who is calling:  Patient  Reason for Call:  Patient called with a medication question. She would like to know if Imodium will have an impact on her INR.  Date of last appointment with primary care: 8/26/19  Okay to leave a detailed message: Yes

## 2021-06-07 NOTE — TELEPHONE ENCOUNTER
Patient reports having diarrhea for one week, once daily, with no other symptoms. Informed patient per micromedex no potential interaction between imodium and warfarin. Instructed to stay well hydrated, decrease fiber intake, try banana, toast or applesauce. If diarrhea worsens/does not subside to follow up. No changes to INR recheck date 4/9/2020.

## 2021-06-07 NOTE — TELEPHONE ENCOUNTER
ANTICOAGULATION  MANAGEMENT    Assessment     Today's INR result of 2.9 is Therapeutic (goal INR of 2.0-3.0)        Warfarin taken as previously instructed    Increased greens/vitamin K intake may be affecting INR     Patient reports alcohol intake increase 2-3 glass of wine per week, plans to return to 0-2 glass of wine per week     Interaction between omeprazole and warfarin may be affecting INR - took as PRN     Continues to tolerate warfarin with no reported s/s of bleeding or thromboembolism     Previous INR was Therapeutic     Plan:     Spoke with Silvia regarding INR result and instructed:     Warfarin Dosing Instructions:  Continue current warfarin dose 7.5 mg daily on Sundays and Wednesdays; and 5 mg daily rest of week  (0 % change)    Instructed patient to follow up no later than: 6 weeks     Education provided: importance of therapeutic range, importance of following up for INR monitoring at instructed interval and importance of taking warfarin as instructed    Silvia verbalizes understanding and agrees to warfarin dosing plan.    Instructed to call the [facility] for any changes, questions or concerns. (#[phone])   ?   Kesha Ronquillo RN    Subjective/Objective:      Silvia Martinez, a 59 y.o. female is on warfarin.     Silvia reports:     Home warfarin dose: verbally confirmed home dose with Silvia and updated on anticoagulation calendar     Missed doses: No     Medication changes:  No     S/S of bleeding or thromboembolism:  No     New Injury or illness:  No, diarrhea has resolved, stress induced      Changes in diet or alcohol consumption:  No     Upcoming surgery, procedure or cardioversion:  Yes, 3 snip punctoplasty with Dr. Bryce Navarro at [facility], postponed until July     Anticoagulation Episode Summary     Current INR goal:   2.0-3.0   TTR:   69.3 % (1 y)   Next INR check:   6/5/2020   INR from last check:   2.90 (4/24/2020)   Weekly max warfarin dose:      Target end date:   Indefinite   INR check  location:      Preferred lab:      Send INR reminders to:   [facility]    Indications    Splenic infarct [D73.5]           Comments:            Anticoagulation Care Providers     Provider Role Specialty Phone number    Jose Ramon Bolton MD Referring Family Medicine [phone]    Chris Pan MD Responsible Family [facility] [phone]

## 2021-06-07 NOTE — PROGRESS NOTES
"Silvia Martinez is a 59 y.o. female who is being evaluated via a billable telephone visit.      \"This telephone visit will be conducted via a call between you and your physician/provider. We have found that certain health care needs can be provided without the need for a physical exam.  This service lets us provide the care you need with a short phone conversation.  If a prescription is necessary we can send it directly to your pharmacy.  If lab work is needed we can place an order for that and you can then stop by our lab to have the test done at a later time.    If during the course of the call the physician/provider feels a telephone visit is not appropriate, you will not be charged for this service.\"       Subjective     CC: Diarrhea         History:  59-year-old female presents presents via phone due to clinic restrictions.  She notes that she has had intermittent diarrhea for the past 3 weeks intermixed with occasional formed stools.  She denies any melena or hematochezia hemoptysis or hematemesis or vomiting or fever or abdominal pain.  It does not wake her up at night.  She is a no foreign travel.  She is never had C. difficile colitis nor been exposed to it.  No recent antibiotic use.  No weight loss.  She is able to maintain hydration.  Recently had a normal colonoscopy.      History       Not marked as reviewed during this visit.            Review of Systems: As above      Objective    Gen: Patient is alert, oriented            Assessment/Plan:  1. Diarrhea, unspecified type  The patient's symptoms are reassuring and that that does not wake up at night, she is able to maintain hydration, and that she occasionally has formed stools.  She has had a great deal of stress in the recent past and this may be part of those symptoms.  Recommended clear liquid diet for 24 hours, and if not improving in next 7 days would consider C. difficile testing.      Phone call duration:  21 minutes    Chris Pan MD    "

## 2021-06-08 NOTE — TELEPHONE ENCOUNTER
ANTICOAGULATION  MANAGEMENT    Assessment     Today's INR result of 2.9 is Therapeutic (goal INR of 2.0-3.0)        Warfarin taken as previously instructed    No new diet changes affecting INR    No new medication/supplements affecting INR    Continues to tolerate warfarin with no reported s/s of bleeding or thromboembolism     Previous INR was Therapeutic    Plan:     Left a detailed message for Silvia regarding INR result and instructed:     Warfarin Dosing Instructions:  Continue current warfarin dose 7.5 mg daily on Sundays and Wednesday; and 5 mg daily rest of week  (0 % change)    Instructed patient to follow up no later than: 6 weeks       Instructed to call the [facility] for any changes, questions or concerns. (#[phone])   ?   Kesha Ronquillo RN    Subjective/Objective:      Silvia Martinez, a 60 y.o. female is on warfarin.     Silvia reports:     Home warfarin dose: as updated on anticoagulation calendar per template     Missed doses: No     Medication changes:  No     S/S of bleeding or thromboembolism:  No     New Injury or illness:  No     Changes in diet or alcohol consumption:  No     Upcoming surgery, procedure or cardioversion:  No    Anticoagulation Episode Summary     Current INR goal:   2.0-3.0   TTR:   69.3 % (1 y)   Next INR check:   7/16/2020   INR from last check:   2.90 (6/4/2020)   Weekly max warfarin dose:      Target end date:   Indefinite   INR check location:      Preferred lab:      Send INR reminders to:   [facility]    Indications    Splenic infarct [D73.5]           Comments:            Anticoagulation Care Providers     Provider Role Specialty Phone number    Jose Ramon Bolton MD Referring Family Medicine [phone]    Chris Pan MD Responsible Family Medicine [phone]

## 2021-06-09 NOTE — TELEPHONE ENCOUNTER
LISA - Status Update  Who is Calling: Patient  Update: Has questions for ACN after today's INR.  - patient interested in starting on a collagen peptide for skin issues, wondering if it would affect her INR ??    - In message left, patient was advised to schedule her next INR out 8 weeks; but before she left the clinic she had scheduled for her usual 5-6 weeks. Wondering about the change.    Okay to leave a detailed message?:  Yes

## 2021-06-09 NOTE — TELEPHONE ENCOUNTER
ANTICOAGULATION  MANAGEMENT    Assessment     Today's INR result of 2.7 is Therapeutic (goal INR of 2.0-3.0)        Warfarin taken as previously instructed    Decrease in eating and drinking, unsual patterns lately  may be affecting diet and INR     No new medication/supplements affecting INR    Continues to tolerate warfarin with no reported s/s of bleeding or thromboembolism     Previous INR was Therapeutic    Plan:     Spoke with Silvia regarding INR result and instructed:     Warfarin Dosing Instructions:  Continue current warfarin dose 7.5 mg daily on Sundays and Wednesdays; and 5 mg daily rest of week  (0 % change)    Instructed patient to follow up no later than: 8 weeks     Education provided: importance of therapeutic range, importance of following up for INR monitoring at instructed interval and importance of taking warfarin as instructed    Silvia verbalizes understanding and agrees to warfarin dosing plan.    Instructed to call the [facility] for any changes, questions or concerns. (#[phone])   ?   Kesha Ronquillo RN    Subjective/Objective:      Silvia Martinez, a 60 y.o. female is on warfarin.     Silvia reports:     Home warfarin dose: verbally confirmed home dose with Silvia and updated on anticoagulation calendar     Missed doses: No     Medication changes:  No     S/S of bleeding or thromboembolism:  No     New Injury or illness:  No     Changes in diet or alcohol consumption:  Yes: reports unusual  patterns related to taking care of mom after she broke her ankle     Upcoming surgery, procedure or cardioversion:  No    Anticoagulation Episode Summary     Current INR goal:   2.0-3.0   TTR:   69.3 % (1 y)   Next INR check:   9/7/2020   INR from last check:   No new INR was available at last check   Weekly max warfarin dose:      Target end date:   Indefinite   INR check location:      Preferred lab:      Send INR reminders to:   [facility]    Indications    Splenic 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

## 2021-06-09 NOTE — TELEPHONE ENCOUNTER
Spoke with patient per [facility] no interaction with collegan peptide and INR. Clarified patients well controlled INRs is stable enough to return in 8 weeks.    Kesha Ronquillo RN

## 2021-06-09 NOTE — PROGRESS NOTES
" OV   3/20/2017  Assessment:     1. Chest congestion  albuterol nebulizer solution 3 mL (PROVENTIL)   2. Cough  albuterol nebulizer solution 3 mL (PROVENTIL)   3. Splenic infarct  INR        Plan:      We tried an albuterol neb here today and there was not much subjective difference in her symptoms and the exam was unchanged. I encouraged her to give the abx more time to work, and to continue symptomatic treatment at this time with mucinex, anti-tussives and increased fluids, rest and humidity, etc. I would not recommend a CXR at this time given her improving symptoms, but if things get worse again we may want to check that.  She will call or return to clinic with any ongoing or worsening symptoms.       Subjective:         Silvia Martinez presents for evaluation of wheezing and productive cough. Symptoms began several days ago. Symptoms have been gradually improving since she was seen and [facility] \"[facility]. She was given a zpack, MDI and advised to start mucinex. Cough is described as productive of green/yellow sputum, harsh, paroxysmal. Symptoms are associated with headaches and nasal congestion and wheezing. She denies fever, hemoptysis, night sweats and shortness of breath. She reports talking, laughing, activity, and lying down all aggravate the cough. She's uncertain if the inhaler is doing much for her respiratory sx.    Past history is significant for occasional episodes of bronchitis. She is on chronic anticoagulation for coagulopathy and history of splenic infarct, and needs an INR today.      The following portions of the patient's history were reviewed and updated as appropriate: allergies, current medications, past family history, past medical history, past social history, past surgical history and problem list.    Review of Systems  Pertinent items are noted in HPI.      Objective:        Visit Vitals     /80     Pulse 84     Ht 5' 6.75\" (1.695 m)     Wt 196 " lb 7 oz (89.1 kg)     Breastfeeding No     BMI 31 kg/m2     General     Alert, cooperative, no distress   Head:    Normocephalic, without obvious abnormality, atraumatic   Eyes:    PERRL, conjunctiva/corneas clear, EOM's intact   Ears:    Normal TM's and external ear canals, both ears   Nose:   Nasal mucosa normal, no drainage, and mild bilateral maxillary sinus tenderness   Throat:   Oropharynx is clear with moist mucous membranes    Neck:   Supple, mild anterior cervical adenopathy and supple, symmetrical, trachea midline    Lungs:     clear to auscultation bilaterally   Heart :    Regular rate and rhythm, no murmur, rub or gallop   Abdomen:     Soft, non-tender, no masses   Skin:   Skin color, texture, turgor normal, no rashes or lesions

## 2021-06-10 NOTE — PROGRESS NOTES
Subjective:   Silvia Martinez is a 57 y.o. female  No question data found.  Chief Complaint   Patient presents with     Ear Pain     Right, seen at [facility] 05/02/2017   Diagnosed at [facility] on 5/2 with swimmer's ear. Has used the ciprodex 4 drops 5 times since 4/2. Patient says it hurts to touch the top of the right side of her head radiating down to her right cheek. The provider was wondering if patient could have shingles. Told patient that there was some fluid behind the TM. Says she feels worse and had no risk factors for swimmer's ear. Says her right ear and head hurt when she lays down on her right side. Says the head pain is sharp. Describes the right ear pain as shooting which goes away after a few seconds. The ear pain can come in a minute or in 10 minutes. The right sided head pain is mainly there when her head is touched, but she can feel a dull ache when the sharp pain is not there. Has not used any new shampoos on her head, or any trauma. Has tried tylenol which has helped a little with the pain. Denies any recent fevers, chills, rash or headache. She is leaving for [place] today and wanted this checked. Denies any decreased hearing or ringing in her ear. Says her eyes are usually dry and has used artificial tears. Has not noticed a rash on her head or right side of face.   Review of Systems  ENT -  Const - see HPI  Allergies   Allergen Reactions     Adhesive      Atorvastatin Myalgia     Peanut Hives       Current Outpatient Prescriptions:      cholecalciferol, vitamin D3, 1,000 unit tablet, Take 2,000 Units by mouth daily., Disp: , Rfl:      CIPRODEX otic suspension, , Disp: , Rfl:      estradiol (ESTRACE) 0.01 % (0.1 mg/gram) vaginal cream, , Disp: , Rfl:      levothyroxine (SYNTHROID, LEVOTHROID) 112 MCG tablet, Take 1 tablet (112 mcg total) by mouth daily., Disp: 30 tablet, Rfl: 0     warfarin (COUMADIN) 5 MG tablet, Take up to 1-1/2 tablets (5.0 to 7.5 mg) by mouth daily. Adjust dose  based on INR results as directed., Disp: 120 tablet, Rfl: 3     b complex vitamins tablet, Take 1 tablet by mouth daily., Disp: , Rfl:   Patient Active Problem List   Diagnosis     Hypothyroidism     Splenic Infarct     Fibromuscular Arterial Hyperplasia     Splenic infarct     Lupus anticoagulant disorder     Medical History Reviewed  Objective:     Vitals:    05/04/17 1405   BP: 118/86   Pulse: 68   Resp: 12   Temp: 98  F (36.7  C)   TempSrc: Oral   SpO2: 97%   Weight: 197 lb 3.2 oz (89.4 kg)   Gen - Pt in NAD  Eyes - PERRL, EOMI, Conjunctiva not injected, no tearing or drainage  Head - NC/AT, no rashes, erythema or induration noted, Patient noted to be TTP when hair or scalp initially touched on the right side of head  Ears -  external canals - no induration or debris noted, Right TM - non injected, Left TM - non injected   Nose -  not congested, no nasal drainage  Pharynx - non injected, tonsils not enlarged  Neck - no cervical adenopathy     Assessment - Plan   Medical Decision Making -57-year-old woman who was treated 2 days ago for suspected right otitis externa has no evidence for extent  otitis externa today.  Discussed the possibility that this could be the beginning of shingles without any type of rash however patient's area of tenderness on her scalp does not align with dermatome map.  Tried to get patient and with 1 of the otolaryngologist was who had clinic today but they had no openings.  Discussed the possibility of starting Valtrex versus watchful waiting and having patient follow-up with ENT when she gets back from her trip.  Patient opted to go along with watchful waiting.  She was instructed that if her symptoms worsen she would need to be seen in 1 of the emergency departments or urgent care while away from her home.  Patient was agreeable to this plan.    1. Right ear pain     2. Scalp pain         At the conclusion of the encounter, assessment and plan were discussed.   All questions were  answered.   The patient or guardian acknowledged understanding and was involved in the decision making regarding the overall care plan.    Patient Instructions   1. Follow up with ENT as soon as possible - [facility] - [phone]  2. If you start getting a rash around your eye, go to an emergency department immediately  3. Follow up with your primary for other concerns  4. Call our clinic number with any questions

## 2021-06-11 NOTE — TELEPHONE ENCOUNTER
ANTICOAGULATION  MANAGEMENT    Assessment     Today's INR result of 2.4 is Therapeutic (goal INR of 2.0-3.0)        Warfarin taken as previously instructed    No new diet changes affecting INR    No new medication/supplements affecting INR    Continues to tolerate warfarin with no reported s/s of bleeding or thromboembolism     Previous INR was Therapeutic    Plan:     Spoke on phone with Silvia regarding INR result and instructed:     Warfarin Dosing Instructions:  Continue current warfarin dose 7.5 mg daily on Sundays and Wednesdays; and 5 mg daily rest of week  (0 % change)    Instructed patient to follow up no later than: 8 weeks, she will return 10/15/2020 to have a recent INR check for [place]    Education provided: importance of therapeutic range, importance of following up for INR monitoring at instructed interval, importance of taking warfarin as instructed and when to seek medical attention/emergency care    Silvia verbalizes understanding and agrees to warfarin dosing plan.    Instructed to call the [facility] for any changes, questions or concerns. (#[phone])   ?   Kesha Ronquillo RN    Subjective/Objective:      Silvia Martinez, a 60 y.o. female is on warfarin.     Silvia reports:     Home warfarin dose: verbally confirmed home dose with Silvia and updated on anticoagulation calendar     Missed doses: No     Medication changes:  No     S/S of bleeding or thromboembolism:  No     New Injury or illness:  No     Changes in diet or alcohol consumption:  No     Upcoming surgery, procedure or cardioversion:  No    Anticoagulation Episode Summary     Current INR goal:   2.0-3.0   TTR:   79.8 % (1 y)   Next INR check:   11/3/2020   INR from last check:   2.40 (9/8/2020)   Weekly max warfarin dose:      Target end date:   Indefinite   INR check location:      Preferred lab:      Send INR reminders to:   [facility]    Indications    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

## 2021-06-12 NOTE — TELEPHONE ENCOUNTER
ANTICOAGULATION  MANAGEMENT    Assessment     Today's INR result of 2.1 is Therapeutic (goal INR of 2.0-3.0)        Warfarin taken as previously instructed    No new diet changes affecting INR    No interaction expected between flu shot and shingrix  and warfarin    Potential interaction between tylenol  and warfarin which may affect subsequent INRs - reports PRN    Continues to tolerate warfarin with no reported s/s of bleeding or thromboembolism     Previous INR was Supratherapeutic    Plan:     Spoke on phone with Silvia regarding INR result and instructed:     Warfarin Dosing Instructions:  Continue current warfarin dose 7.5 mg daily on Sundays and Wednesdays; and 5 mg daily rest of week  (0 % change)    Instructed patient to follow up no later than: 2 weeks, scheduled 11/10; she will be traveling and would like to know what her INR is prior to     Education provided: importance of following up for INR monitoring at instructed interval, importance of taking warfarin as instructed, importance of notifying clinic for changes in medications, when to seek medical attention/emergency care and importance of notifying clinic for diarrhea, nausea/vomiting, reduced intake and/or illness    Silvia verbalizes understanding and agrees to warfarin dosing plan.    Instructed to call the [facility] for any changes, questions or concerns. (#[phone])   ?   Nalini Ronquillo RN    Subjective/Objective:      Silvia Martinez, a 60 y.o. female is on warfarin.     Silvia reports:     Home warfarin dose: verbally confirmed home dose with Silvia  and updated on anticoagulation calendar     Missed doses: No     Medication changes:  No     S/S of bleeding or thromboembolism:  No     New Injury or illness:  No     Changes in diet or alcohol consumption:  No     Upcoming surgery, procedure or cardioversion:  No     Anticoagulation Episode Summary     Current INR goal:  2.0-3.0   TTR:  84.6 % (1 y)   Next INR check:  11/12/2020   INR from last  check:  2.10 (10/29/2020)   Weekly max warfarin dose:     Target end date:  Indefinite   INR check location:     Preferred lab:     Send INR reminders to:  [facility]    Indications    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

## 2021-06-13 NOTE — TELEPHONE ENCOUNTER
ANTICOAGULATION  MANAGEMENT    Assessment     Today's INR result of 2.7 is Therapeutic (goal INR of 2.0-3.0)        Warfarin taken as previously instructed    No new diet changes affecting INR    Interaction between tylenol and warfarin may be affecting INR    No interaction expected between decongest agent  and warfarin    Continues to tolerate warfarin with no reported s/s of bleeding or thromboembolism     Previous INR was Therapeutic    Plan:     Spoke on phone with Sivlia regarding INR result and instructed:     Warfarin Dosing Instructions:  Continue current warfarin dose 7.5 mg daily on Sundays and Wednesdays; and 5 mg daily rest of week  (0 % change)    Instructed patient to follow up no later than: 4 weeks     Education provided: importance of following up for INR monitoring at instructed interval, importance of taking warfarin as instructed, importance of notifying clinic for changes in medications, when to seek medical attention/emergency care, importance of notifying clinic for diarrhea, nausea/vomiting, reduced intake and/or illness and travel related clotting risk and prevention    Silvia verbalizes understanding and agrees to warfarin dosing plan.    Instructed to call the [facility] for any changes, questions or concerns. (#[phone])   ?   Nalini Ronquillo RN    Subjective/Objective:      Silvia Martinez, a 60 y.o. female is on warfarin.     Silvia reports:     Home warfarin dose: verbally confirmed home dose with Silvia and updated on anticoagulation calendar     Missed doses: No     Medication changes:  No, still using tylenol and decongestant for seasonal change allergies      S/S of bleeding or thromboembolism:  No     New Injury or illness:  No     Changes in diet or alcohol consumption:  No     Upcoming surgery, procedure or cardioversion:  No    Anticoagulation Episode Summary     Current INR goal:  2.0-3.0   TTR:  87.9 % (1 y)   Next INR check:  12/8/2020   INR from last check:  2.70 (11/10/2020)    Weekly max warfarin dose:     Target end date:  Indefinite   INR check location:     Preferred lab:     Send INR reminders to:  [facility]    Indications    Splenic 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

## 2021-06-13 NOTE — TELEPHONE ENCOUNTER
ANTICOAGULATION  MANAGEMENT    Assessment     Today's INR result of 2.5 is Therapeutic (goal INR of 2.0-3.0)        Warfarin taken as previously instructed    No new diet changes affecting INR    No new medication/supplements affecting INR    Continues to tolerate warfarin with no reported s/s of bleeding or thromboembolism     Previous INR was Therapeutic    Plan:     Spoke on phone with Silvia regarding INR result and instructed:     Warfarin Dosing Instructions:  Continue current warfarin dose 7.5 mg daily on Sundays and Wednesdays; and 5 mg daily rest of week  (0 % change)    Instructed patient to follow up no later than: 4 weeks.    Education provided: importance of therapeutic range, importance of following up for INR monitoring at instructed interval and importance of taking warfarin as instructed    Silvia verbalizes understanding and agrees to warfarin dosing plan.    Instructed to call the [facility] for any changes, questions or concerns. (#[phone])   ?   Mechelle Cox RN    Subjective/Objective:      Silvia Martinez, a 60 y.o. female is on warfarin.     Silvia reports:     Home warfarin dose: verbally confirmed home dose with Silvia and updated on anticoagulation calendar     Missed doses: No     Medication changes:  No     S/S of bleeding or thromboembolism:  No     New Injury or illness:  No     Changes in diet or alcohol consumption:  No     Upcoming surgery, procedure or cardioversion:  No    Anticoagulation Episode Summary     Current INR goal:  2.0-3.0   TTR:  91.6 % (1 y)   Next INR check:  1/7/2021   INR from last check:  2.50 (12/10/2020)   Weekly max warfarin dose:     Target end date:  Indefinite   INR check location:     Preferred lab:     Send INR reminders to:  [facility]    Indications    Splenic 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

## 2021-06-14 NOTE — TELEPHONE ENCOUNTER
Reason for call:  Patient reporting a symptom    Symptom or request: Ear pain has resolved, but in the last 10 days, one sided nose congestion ,facial tenderness. No sore throat, no temp and no cough.  Hoping for a rx to be sent to  [facility]      Duration (how long have symptoms been present): since Christmas    Have you been treated for this before? No    Additional comments: [facility] in [place]    Phone Number patient can be reached at:  Cell number on file:    Telephone Information:   Mobile [phone]       Best Time:  today    Can we leave a detailed message on this number: Yes    Call taken on 1/22/2021 at 8:04 AM by Mary Correia

## 2021-06-14 NOTE — TELEPHONE ENCOUNTER
ANTICOAGULATION  MANAGEMENT    Assessment     Today's INR result of 2.9 is Therapeutic (goal INR of 2.0-3.0)        Warfarin taken as previously instructed    No new diet changes affecting INR    No new medication/supplements affecting INR    Continues to tolerate warfarin with no reported s/s of bleeding or thromboembolism     Previous INR was Therapeutic    Plan:     Left detailed message for Silvia regarding INR result and instructed:     Warfarin Dosing Instructions:  Continue current warfarin dose 7.5 mg daily on Sundays and Wednesdays; and 5 mg daily rest of week  (0 % change)    Instructed patient to follow up no later than: 6 weeks.    Education provided:    Instructed to call the [facility] for any changes, questions or concerns. (#[phone])   ?   Mechelle Cox RN    Subjective/Objective:      Silvia Martinez, a 60 y.o. female is on warfarin.     Silvia reports:     Home warfarin dose: as updated on anticoagulation calendar per template     Missed doses: No     Medication changes:  No     S/S of bleeding or thromboembolism:  No     New Injury or illness:  No     Changes in diet or alcohol consumption:  No     Upcoming surgery, procedure or cardioversion:  No    Anticoagulation Episode Summary     Current INR goal:  2.0-3.0   TTR:  91.6 % (1 y)   Next INR check:  2/18/2021   INR from last check:  2.90 (1/7/2021)   Weekly max warfarin dose:     Target end date:  Indefinite   INR check location:     Preferred lab:     Send INR reminders to:  [facility]    Indications    Splenic 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

## 2021-06-14 NOTE — TELEPHONE ENCOUNTER
ANTICOAGULATION  MANAGEMENT    Assessment     Today's INR result of 2.2 is Therapeutic (goal INR of 2.0-3.0)        Warfarin taken as previously instructed    No new diet changes affecting INR     Day 4/7 of Augmentin doses for sinus infection.    Interaction between Augmentin, Nyquil,Coricidin HBP and warfarin may be affecting INR    Continues to tolerate warfarin with no reported s/s of bleeding or thromboembolism     Previous INR was Supratherapeutic    Plan:     Spoke on phone with Silvia regarding INR result and instructed:     Warfarin Dosing Instructions:  Continue current warfarin dose    7.5 mg every Sun, Wed; 5 mg all other days      (0 % change)    Instructed patient to follow up no later than: 2 weeks after completing antibiotic- Instructed to come in sooner if signes and symptoms of bleeding is noted.    Education provided: importance of therapeutic range, target INR goal and significance of current INR result and potential interaction between warfarin and Augmentin    Silvia verbalizes understanding and agrees to warfarin dosing plan.    Instructed to call the [facility] for any changes, questions or concerns. (#[phone])   ?   Gabrielle Britton RN    Subjective/Objective:      Silvia WEIR Juan, a 60 y.o. female is on warfarin.     Silvia reports:     Home warfarin dose: as updated on anticoagulation calendar per template     Missed doses: No     Medication changes:  Yes: Augmentin,Nyquil,Coricidin HBP     S/S of bleeding or thromboembolism:  No     New Injury or illness:  Yes: Sinus infection     Changes in diet or alcohol consumption:  No     Upcoming surgery, procedure or cardioversion:  No    Anticoagulation Episode Summary     Current INR goal:  2.0-3.0   TTR:  93.4 % (1 y)   Next INR check:  2/12/2021   INR from last check:  2.20 (1/26/2021)   Weekly max warfarin dose:     Target end date:  Indefinite   INR check location:     Preferred lab:     Send INR reminders to:  [facility]     Indications    Splenic 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

## 2021-06-14 NOTE — PROGRESS NOTES
"Assessment & Plan:     1. Dysfunction of both eustachian tubes     2. Otalgia, right            We reviewed the likely etiology for her ear symptoms, and we discussed use of OTC analgesics, antihistamines+/- decongestant, and mucinex, as well as increased fluids and rest, and she will call or return to clinic with any ongoing or worsening symptoms.      Subjective:         Silvia Martinez is a 60 y.o. female whom presents for evaluation of plugged sensation and crinkling sound in the left ear for the past 2 days. There has been no improvement since that time, and now she is having pain in the right ear in the preauricular area. That pain is worse with swallowing.  She notes some intermittent nasal drainage and congestion which is a chronic issue for her. Patient denies chills, facial pain, fever, productive cough and sore throat. History of previous ear infections: no. Hearing is ok. She tried a decongestant with brief relief of the symptoms.      There is not a prior history of cerumen impaction. The patient has not been using ear drops to loosen wax immediately prior to this visit.      No exposures, works at [employer], office by herself       The following portions of the patient's history were reviewed and updated as appropriate: allergies, current medications, past family history, past medical history, past social history, past surgical history and problem list.    Review of Systems  A 12 point comprehensive review of systems was negative except as noted.      Objective:     /62   Pulse 81   Temp 98.6  F (37  C)   Ht 5' 6.75\" (1.695 m)   Wt 190 lb (86.2 kg)   SpO2 98%   BMI 29.98 kg/m    General     Alert, cooperative, no distress   Head:    Normocephalic, without obvious abnormality, atraumatic   Eyes:    PERRL, conjunctiva/corneas clear, EOM's intact   Ears:    Normal TM and external ear canal on right, left with air fluid levels and slight injection.    Nose:   Nares normal, mucosa normal, no " drainage or sinus tenderness   Throat:   Oropharynx reveals 2+ tonsils bilaterally without erythema or exudate, moist mucous membranes    Neck:   Supple, no adenopathy and supple, symmetrical, trachea midline    Lungs:     clear to auscultation bilaterally   Heart:    Regular rate and rhythm, no murmur, rub or gallop   Abdomen:     Soft, non-tender, no masses   Skin:   Skin color, texture, turgor normal, no rashes or lesions

## 2021-06-15 NOTE — TELEPHONE ENCOUNTER
ANTICOAGULATION  MANAGEMENT    Assessment     Today's INR result of 2.5 is Therapeutic (goal INR of 2.0-3.0)        Warfarin taken as previously instructed    No new diet changes affecting INR    No new medication/supplements affecting INR    Continues to tolerate warfarin with no reported s/s of bleeding or thromboembolism     Previous INR was Therapeutic    Plan:     Spoke on phone with Silvia regarding INR result and instructed:      Warfarin Dosing Instructions:  Continue current warfarin dose 7.5 mg daily on Sundays and Wednesdays; and 5 mg daily rest of week  (0 % change)    Instructed patient to follow up no later than: 4 weeks.    Education provided: importance of therapeutic range, importance of following up for INR monitoring at instructed interval and importance of taking warfarin as instructed    Silvia verbalizes understanding and agrees to warfarin dosing plan.    Instructed to call the [facility] for any changes, questions or concerns. (#[phone])   ?   Mechelle Cox RN    Subjective/Objective:      Silvia Martinez, a 60 y.o. female is on warfarin. Silvia Vega reports:     Home warfarin dose: verbally confirmed home dose with Silvia and updated on anticoagulation calendar     Missed doses: No     Medication changes:  No she got her first covid vaccine on 2/8. Second will be on 3/11.     S/S of bleeding or thromboembolism:  No     New Injury or illness:  No     Changes in diet or alcohol consumption:  No     Upcoming surgery, procedure or cardioversion:  No    Anticoagulation Episode Summary     Current INR goal:  2.0-3.0   TTR:  93.8 % (1 y)   Next INR check:  3/12/2021   INR from last check:  2.50 (2/12/2021)   Weekly max warfarin dose:     Target end date:  Indefinite   INR check location:     Preferred lab:     Send INR reminders to:  [facility]    Indications    Splenic infarct [D73.5]           Comments:           Anticoagulation Care Providers     Provider Role Specialty Phone  number    Jose Ramon Bolton MD [facility] [phone]    Chris Pan MD Responsible Family Medicine [phone]

## 2021-06-15 NOTE — PROGRESS NOTES
Assessment/Plan:   Cough  Back pain  Anticoagulated  Influenza A  URI with cough since early December.  Treated 12/12 with zpak, no change.  Slowly better, then worse last 3 days with increased cough, diarrhea, HA, URI, and back pain. Some wheezing. Takes coumadin. CXR, CBC, normal.  minimally better air movement with neb.  Positive for Influenza A.   - XR Chest 2 Views  - HM1(CBC and Differential)  - albuterol nebulizer solution 3 mL (PROVENTIL); Take 3 mL by nebulization once.  - HM1 (CBC with Diff)  - Influenza A/B Rapid Test  - benzonatate (TESSALON PERLES) 100 MG capsule; Take 1 capsule (100 mg total) by mouth 3 (three) times a day as needed for cough.  Dispense: 30 capsule; Refill: 0  - acetaminophen tablet 650 mg (TYLENOL); Take 2 tablets (650 mg total) by mouth once.  - INR  - oseltamivir (TAMIFLU) 75 MG capsule; Take 1 capsule (75 mg total) by mouth 2 (two) times a day for 5 days.  Dispense: 10 capsule; Refill: 0    Fluids, rest, steam, nasal saline, humidifier  Tamiflu twice a day for 5 days  Tessalon Perles as needed for cough   Cough drops  Nyquil is okay  Follow up as needed  No work for 2 more days, note written.   Usual coumadin dose today (7.5mg) and adjust tomorrow if needed with the INR nurse.     Subjective:      Silvia Martinez is a 57 y.o. female who presents with cough.  Since early December she has had URI and cough.  She was treated 12/12 with azithromycin with no significant change in illness.  She did slowly improve and felt somewhat better 12/23-26 but never stopped coughing.  The last 3 days she has had increased URI with clear nasal drainage, worse cough 0 different - more tight and wheezy.  She has also had liquid diarrhea but no N/V.  No definite fever or chills.  She has had HA and body aches - mainly mid to upper back pain and some chest wall pain anteriorly with cough.  No flank pain or abdominal pain, no urinary sxs.  No ST. No sinus pain.  She has had flu vaccine.  Taking dayquil and  nyquil and tylenol. Lots of fatigue and malaise worse since yesterday.  Very uncomfortable with back pain initially - was given tylenol in the office with relief of discomfort.  She has coagulation disorder and takes coumadin. She is due for INR.  No rash.  No leg pain or swelling.  nyquil has helped with nighttime cough. No history of wheezing or asthma, hasn't used inhalers before.      Allergies   Allergen Reactions     Adhesive      Atorvastatin Myalgia     Peanut Hives     Current Outpatient Prescriptions on File Prior to Visit   Medication Sig Dispense Refill     cholecalciferol, vitamin D3, 1,000 unit tablet Take 2,000 Units by mouth daily.       CIPRODEX otic suspension        estradiol (ESTRACE) 0.01 % (0.1 mg/gram) vaginal cream        levothyroxine (SYNTHROID, LEVOTHROID) 112 MCG tablet Take 1 tablet (112 mcg total) by mouth daily. 30 tablet 0     warfarin (COUMADIN) 5 MG tablet Take up to 1-1/2 tablets (5.0 to 7.5 mg) by mouth daily. Adjust dose based on INR results as directed. 120 tablet 3     b complex vitamins tablet Take 1 tablet by mouth daily.       No current facility-administered medications on file prior to visit.      Patient Active Problem List   Diagnosis     Hypothyroidism     Splenic Infarct     Arterial fibromuscular dysplasia     Splenic infarct     Lupus anticoagulant disorder       Objective:     /80  Pulse 87  Temp 98.2  F (36.8  C)  Resp 16  Wt 202 lb (91.6 kg)  SpO2 98%  BMI 31.88 kg/m2    Physical  General Appearance: Alert, cooperative, no distress, uncomfortable, fatigued and ill appearing.  AVSS  Head: Normocephalic, without obvious abnormality, atraumatic  Eyes: Conjunctivae are normal.   Ears: Normal TMs and external ear canals, both ears  Nose: congestion, no sinus pain  Throat: Throat is normal.  No exudate.  No significant lesions  Neck: No adenopathy  Lungs: few rhonchi clear with cough, prolonged exp phase and cough triggered with deep breaths.  No wheeze or  rales. Albuterol neb given with no subjective improvement in cough but some better air movement on auscultation, respirations unlabored.  No chest wall pain with palpation, substernal pain with cough only, no pleuritic pains. sats 98%.  Tender muscles mid and upper back.    Heart: Regular rate and rhythm  Abdomen: Soft, non-tender, no masses, no organomegaly, no CVA tenderness with percussion.   Extremities: No lower extremity edema, no calf tenderness  Skin: Skin color, texture, turgor normal, no rashes or lesions  Psychiatric: Patient has a normal mood and affect.        Recent Results (from the past 24 hour(s))   INR   Result Value Ref Range    INR 1.90 (H) 0.90 - 1.10   HM1 (CBC with Diff)   Result Value Ref Range    WBC 3.2 (L) 4.0 - 11.0 thou/uL    RBC 4.38 3.80 - 5.40 mill/uL    Hemoglobin 13.7 12.0 - 16.0 g/dL    Hematocrit 40.3 35.0 - 47.0 %    MCV 92 80 - 100 fL    MCH 31.2 27.0 - 34.0 pg    MCHC 33.9 32.0 - 36.0 g/dL    RDW 11.4 11.0 - 14.5 %    Platelets 224 140 - 440 thou/uL    MPV 7.3 7.0 - 10.0 fL    Neutrophils % 38 (L) 50 - 70 %    Lymphocytes % 48 (H) 20 - 40 %    Monocytes % 12 (H) 2 - 10 %    Eosinophils % 2 0 - 6 %    Basophils % 1 0 - 2 %    Neutrophils Absolute 1.2 (L) 2.0 - 7.7 thou/uL    Lymphocytes Absolute 1.5 0.8 - 4.4 thou/uL    Monocytes Absolute 0.4 0.0 - 0.9 thou/uL    Eosinophils Absolute 0.1 0.0 - 0.4 thou/uL    Basophils Absolute 0.0 0.0 - 0.2 thou/uL   Influenza A/B Rapid Test   Result Value Ref Range    Influenza  A, Rapid Antigen Influenza A antigen detected (!) No Influenza A antigen detected    Influenza B, Rapid Antigen No Influenza B antigen detected No Influenza B antigen detected

## 2021-06-15 NOTE — TELEPHONE ENCOUNTER
ANTICOAGULATION  MANAGEMENT    Assessment     Today's INR result of 2.8 is Therapeutic (goal INR of 2.0-3.0)        Warfarin taken as previously instructed    No new diet changes affecting INR    No new medication/supplements affecting INR     Covid vaccine on 3/11    Continues to tolerate warfarin with no reported s/s of bleeding or thromboembolism     Previous INR was Therapeutic    Plan:     Spoke on phone with Silvia regarding INR result and instructed:      Warfarin Dosing Instructions:  Continue current warfarin dose 7.5 mg daily on Sun, Wed; and 5 mg daily rest of week  (0 % change)    Instructed patient to follow up no later than: 4 weeks     Silvia verbalizes understanding and agrees to warfarin dosing plan.    Instructed to call the [facility] for any changes, questions or concerns. (#[phone])   ?   Radha Powell RN    Subjective/Objective:      Silvia Martinez, a 60 y.o. female is on warfarin. Silvia Vega reports:     Home warfarin dose: verbally confirmed home dose with Silvia and updated on anticoagulation calendar     Missed doses: No     Medication changes:  No     S/S of bleeding or thromboembolism:  No     New Injury or illness:  No     Changes in diet or alcohol consumption:  No     Upcoming surgery, procedure or cardioversion:  No    Anticoagulation Episode Summary     Current INR goal:  2.0-3.0   TTR:  93.8 % (1 y)   Next INR check:  4/13/2021   INR from last check:  2.80 (3/16/2021)   Weekly max warfarin dose:     Target end date:  Indefinite   INR check location:     Preferred lab:     Send INR reminders to:  [facility]    Indications    Splenic infarct [D73.5]           Comments:           Anticoagulation Care Providers     Provider Role Specialty Phone number    Jose Ramon Bolton MD Referring Family Medicine [phone]    Chris Pan MD Responsible Family Medicine [phone]

## 2021-06-16 NOTE — TELEPHONE ENCOUNTER
Telephone Encounter by Kesha Ronquillo RN at 3/12/2020  1:25 PM     Author: Kesha Ronquillo RN Service: -- Author Type: Registered Nurse    Filed: 3/12/2020  2:00 PM Encounter Date: 3/12/2020 Status: Attested    : Kesha Ronquillo RN (Registered Nurse) Cosigner: Chris Pan MD at 3/13/2020 10:58 AM    Attestation signed by Chris Pan MD at 3/13/2020 10:58 AM    .                ANTICOAGULATION  MANAGEMENT    Assessment     Today's INR result of 2.5 is Therapeutic (goal INR of 2.0-3.0)        Warfarin taken as previously instructed    No new diet changes affecting INR    No new medication/supplements affecting INR    Continues to tolerate warfarin with no reported s/s of bleeding or thromboembolism     Previous INR was Therapeutic     Plan:     Spoke with Silvia regarding INR result and instructed:     Warfarin Dosing Instructions:  Continue current warfarin dose 7.5 mg daily on Sundays and Wednesdays; and 5 mg daily rest of week  (0 % change)    Instructed patient to follow up no later than: 4 weeks     Patient is having a 3 snip punctoplasty with Dr. Bryce Navarro at [facility], spoke with Yandy, reports typically there is no hold with warfarin with these procedures, sending a message to Navarro team they will contact the patient with any adjstments or change needed, patient to contact [facility] with any changes. Reviewed with Glory Shaikh, typically eye procedures are low risk       Education provided: importance of therapeutic range, importance of following up for INR monitoring at instructed interval and importance of taking warfarin as instructed    Silvia verbalizes understanding and agrees to warfarin dosing plan.    Instructed to call the [facility] for any changes, questions or concerns. (#[phone])   ?   Kesha Ronquillo RN    Subjective/Objective:      Silvia Martinez, a 59 y.o. female is on warfarin.     Silvia reports:     Home warfarin dose: verbally confirmed home dose with Silvia and  updated on anticoagulation calendar     Missed doses: No     Medication changes:  No     S/S of bleeding or thromboembolism:  No     New Injury or illness:  No     Changes in diet or alcohol consumption:  No     Upcoming surgery, procedure or cardioversion:  Yes: 3 snip punctoplasty, 3/20/2020     Anticoagulation Episode Summary     Current INR goal:   2.0-3.0   TTR:   69.3 % (1 y)   Next INR check:   4/9/2020   INR from last check:   2.50 (3/12/2020)   Weekly max warfarin dose:      Target end date:   Indefinite   INR check location:      Preferred lab:      Send INR reminders to:   [facility]    Indications    Splenic infarct [D73.5]           Comments:            Anticoagulation Care Providers     Provider Role Specialty Phone number    Jose Ramon Bolton MD Referring Family Medicine [phone]    Chris Pan MD Responsible Family Medicine [phone]

## 2021-06-17 NOTE — TELEPHONE ENCOUNTER
ANTICOAGULATION  MANAGEMENT    Assessment     Today's INR result of 2.5 is Therapeutic (goal INR of 2.0-3.0)        Warfarin taken as previously instructed    Diet different in the past 10 days may be affecting diet and INR    No new medication/supplements affecting INR    Continues to tolerate warfarin with no reported s/s of bleeding or thromboembolism     Previous INR was Therapeutic    Plan:     Spoke on phone with Silvia regarding INR result and instructed:      Warfarin Dosing Instructions:  Continue current warfarin dose    7.5 mg every Sun, Wed; 5 mg all other days      (0 % change)    Instructed patient to follow up no later than: 4-6 weeks, patient already made appointment.    Education provided: importance of therapeutic range, target INR goal and significance of current INR result, importance of following up for INR monitoring at instructed interval and importance of taking warfarin as instructed    Silvia verbalizes understanding and agrees to warfarin dosing plan.    Instructed to call the [facility] for any changes, questions or concerns. (#[phone])   ?   Gabrielle Britton RN    Subjective/Objective:      Silvia Martinez, a 60 y.o. female is on warfarin. Silvia Vega reports:     Home warfarin dose: as updated on anticoagulation calendar per template     Missed doses: No     Medication changes:  No     S/S of bleeding or thromboembolism:  No     New Injury or illness:  No     Changes in diet or alcohol consumption:  Yes: strange last 10 days taking care of father in law in [facility].     Upcoming surgery, procedure or cardioversion:  No    Anticoagulation Episode Summary     Current INR goal:  2.0-3.0   TTR:  93.8 % (1 y)   Next INR check:  6/11/2021   INR from last check:  2.50 (4/30/2021)   Weekly max warfarin dose:     Target end date:  Indefinite   INR check location:     Preferred lab:     Send INR reminders to:  [facility] [place]    Indications    Splenic infarct [D73.5]            Comments:           Anticoagulation Care Providers     Provider Role Specialty Phone number    Jose Ramon Bolton MD Referring Family Medicine [phone]    Chris Pan MD Responsible Family Medicine [phone]

## 2021-06-17 NOTE — TELEPHONE ENCOUNTER
Telephone Encounter by Leigh Ladd RN at 3/1/2021  4:08 PM     Author: Leigh Ladd RN Service: -- Author Type: Registered Nurse    Filed: 3/1/2021  4:10 PM Encounter Date: 3/1/2021 Status: Attested    : Leigh Ladd RN (Registered Nurse) Cosigner: Chris Pan MD at 3/2/2021  4:00 PM    Attestation signed by Chris Pan MD at 3/2/2021  4:00 PM    .                Anticoagulation Annual Referral Renewal Review    Silvia Martinez's chart reviewed for annual renewal of referral to anticoagulation monitoring.        Criteria for anticoagulation nurse and/or pharmacist renewal met   Warfarin indication: Splenic infarct Yes, per indication   Current with INR monitoring/compliant Yes Yes   Date of last office visit 12/24/2020 Yes, had office visit within last year   Time in Therapeutic Range (TTR) 93.8 % Yes, TTR > 60%       Silvia Martinez met all criteria for anticoagulation management program initiated renewal.  New INR standing orders and anticoagulation referral renewal placed.      Leigh Ladd RN  4:10 PM

## 2021-06-17 NOTE — TELEPHONE ENCOUNTER
Telephone Encounter by Nalini Ronquillo RN at 10/15/2020  3:17 PM     Author: Nalini Ronquillo RN Service: -- Author Type: Registered Nurse    Filed: 10/15/2020  4:33 PM Encounter Date: 10/15/2020 Status: Signed    : Nalini Ronquillo RN (Registered Nurse)       ANTICOAGULATION  MANAGEMENT    Assessment     Today's INR result of 3.3 is Supratherapeutic (goal INR of 2.0-3.0)        Warfarin taken as previously instructed    Decreased greens/vitamin K intake may be affecting INR - not drastic reports but less than normal, not ongoing to resume normal green intake     No interaction expected between sudafed  and warfarin    Continues to tolerate warfarin with no reported s/s of bleeding or thromboembolism     Previous INR was Therapeutic X 7     Plan:     Spoke on phone with Silvia regarding INR result and instructed:     Warfarin Dosing Instructions:  Take warfarin 2.5 mg tonight  then continue current warfarin dose 7.5 mg daily on Sundays and Wednesdays; and 5 mg daily rest of week  (0 % change)    Instructed patient to follow up no later than: 2 weeks, scheduled 10/29/2020    Education provided: importance of therapeutic range, target INR goal and significance of current INR result, importance of following up for INR monitoring at instructed interval, importance of taking warfarin as instructed, importance of notifying clinic for changes in medications, when to seek medical attention/emergency care and importance of notifying clinic for diarrhea, nausea/vomiting, reduced intake and/or illness    Silvia verbalizes understanding and agrees to warfarin dosing plan.    Instructed to call the [facility] for any changes, questions or concerns. (#[phone])   ?   Nalini Ronquillo RN    Subjective/Objective:      Silvia Martinez, a 60 y.o. female is on warfarin.     Silvia reports:     Home warfarin dose: verbally confirmed home dose with Silvia and updated on anticoagulation calendar     Missed doses: No      Medication changes:  No     S/S of bleeding or thromboembolism:  No     New Injury or illness:  No     Changes in diet or alcohol consumption: Yes, slight less greens than normal      Upcoming surgery, procedure or cardioversion:  No    Anticoagulation Episode Summary     Current INR goal:  2.0-3.0   TTR:  81.7 % (1 y)   Next INR check:  10/29/2020   INR from last check:  3.30 (10/15/2020)   Weekly max warfarin dose:     Target end date:  Indefinite   INR check location:     Preferred lab:     Send INR reminders to:  [facility]    Indications    Splenic infarct [D73.5]           Comments:           Anticoagulation Care Providers     Provider Role Specialty Phone number    Jose Ramon Bolton MD Referring Family Medicine [phone]    Chris Pan MD Responsible Family Medicine [phone]

## 2021-06-17 NOTE — TELEPHONE ENCOUNTER
Telephone Encounter by Mechelle Cox RN at 4/6/2020 11:17 AM     Author: Mechelle Cox RN Service: -- Author Type: Registered Nurse    Filed: 4/6/2020 11:20 AM Encounter Date: 4/6/2020 Status: Attested    : Mechelle Cox RN (Registered Nurse) Cosigner: Chris Pan MD at 4/6/2020  1:36 PM    Attestation signed by Chris Pan MD at 4/6/2020  1:36 PM    .                Anticoagulation Annual Referral Renewal Review    Silvia Martinez's chart reviewed for annual renewal of referral to anticoagulation monitoring.        Criteria for anticoagulation nurse and/or pharmacist renewal met   Warfarin indication: Splenic Infart, lupus Yes, per indication   Current with INR monitoring/compliant Yes Yes   Date of last office visit 4/3/20 Yes, had office visit within last year   Time in Therapeutic Range (TTR) 69.3 % Yes, TTR > 60%       Silvia Martinez met all criteria for anticoagulation management program initiated renewal.  New INR standing orders and anticoagulation referral renewal placed.      Mechelle Cox RN  11:18 AM

## 2021-06-18 NOTE — PROGRESS NOTES
"Chief Complaint   Patient presents with     Foot Problem     R foot pain bruise tender keeping her from exercising and L arm ROM causes sharp pain shoots across bicep x 3 weeks       HPI: Silvia presents today for multiple issues.  The first is right ankle pain.  She has pain lateral aspect right ankle that is moderate in been there for 3 weeks.  No history of trauma.  Mild swelling noted.    She has left shoulder pain.  It has been there between 4 and 6 weeks, associated with internal rotation and abduction of the shoulder.  Moderate in intensity.    She sees [facility] for her fibromuscular dysplasia and that has been stable.  She also continues to take Coumadin for her lupus anticoagulant and her INRs have been stable.    ROS: No locking clicking or popping of the shoulder.  No distal neurological or vascular dysfunction of the left shoulder, left upper extremity or right ankle.    SH:    reports that she has never smoked. She has never used smokeless tobacco. She reports that she does not drink alcohol or use illicit drugs.      FH: The Patient's family history includes Asthma in her mother; Breast cancer in her cousin; Cancer in her maternal grandmother; Hypertension in her father and mother; Osteoporosis in her father and mother; Thyroid disease in her mother.     Meds:  Silvia has a current medication list which includes the following prescription(s): b complex vitamins, cholecalciferol (vitamin d3), cholecalciferol (vitamin d3), ciprodex, estradiol, levothyroxine, omeprazole, UNABLE TO FIND, UNABLE TO FIND, warfarin, and warfarin.    O:  /70  Pulse 76  Resp 16  Ht 5' 6.75\" (1.695 m)  Wt 199 lb 8 oz (90.5 kg)  SpO2 98%  BMI 31.48 kg/m2  Examination of the left shoulder shows both shoulders symmetric, normal internal and external rotation, normal flexion extension and normal range of motion.  No pain to palpation or resistance of abduction.  Rotator cuff appears to be intact.    Right ankle shows pain " to palpation over the anterior talofibular ligament.  Normal distal neurological vascular function.  She is able to flex and extend without difficulty.    A/P:   1. Right ankle sprain  -Recommended ankle support tickly with lateral movement.    2. Acute pain of left shoulder  -Reassurance.  Not improving patient will return    3.  Lupus anticoagulant disorder  -Continue warfarin

## 2021-06-22 ENCOUNTER — COMMUNICATION - HEALTHEAST (OUTPATIENT)
Dept: ANTICOAGULATION | Facility: CLINIC | Age: 61
End: 2021-06-22

## 2021-06-22 ENCOUNTER — AMBULATORY - HEALTHEAST (OUTPATIENT)
Dept: LAB | Facility: CLINIC | Age: 61
End: 2021-06-22

## 2021-06-22 DIAGNOSIS — D73.5 SPLENIC INFARCT: ICD-10-CM

## 2021-06-22 LAB — INR PPP: 2.5 (ref 0.9–1.1)

## 2021-06-22 NOTE — PROGRESS NOTES
Preoperative Exam    Scheduled Procedure: closed reduction right great toe K wire placement  Surgery Date:  12/11/2018  Surgery Location: [facility]     Surgeon:  Dr Huitron    Very pleasant 58-year-old female presents today for preoperative evaluation for closed reduction of her right great toe with K wire placement on December 11, 2018 at [facility].  She has past surgeries with no complications.  Family history negative for malignant hyperthermia, she takes warfarin for coagulopathy and she should continue that, she has no history of obstructive sleep apnea, and she is a Mallampati class II.    Assessment/Plan:     1. Left foot pain  Patient is been optimized for surgery    2.  Lupus anticoagulant disorder (H)  Tinea warfarin    3.Acquired hypothyroidism  Continue Synthroid      Surgical Procedure Risk: Low (reported cardiac risk generally < 1%)  Have you had prior anesthesia?: Yes  Have you or any family members had a previous anesthesia reaction:  No  Do you or any family members have a history of a clotting or bleeding disorder?: Yes: FMD  Cardiac Risk Assessment: no increased risk for major cardiac complications    Functional Status: Independent  Patient plans to recover at home with family.     Subjective:      Silvia Martinez is a 58 y.o. female who presents for a preoperative consultation.      All other systems reviewed and are negative, other than those listed in the HPI.    Pertinent History  Do you have difficulty breathing or chest pain after walking up a flight of stairs: No  History of obstructive sleep apnea: No  Steroid use in the last 6 months: No  Frequent Aspirin/NSAID use: No  Prior Blood Transfusion: No  Prior Blood Transfusion Reaction: No  If for some reason prior to, during or after the procedure, if it is medically indicated, would you be willing to have a blood transfusion?:  There is no transfusion refusal.      ROS: 10 point system review complete  notable for factor V Leiden, hypertension and arterial fibromuscular dysplasia.    Current Outpatient Medications   Medication Sig Dispense Refill     cholecalciferol, vitamin D3, 1,000 unit tablet Take 2,000 Units by mouth daily.       estradiol (ESTRACE) 0.01 % (0.1 mg/gram) vaginal cream        levothyroxine (SYNTHROID, LEVOTHROID) 112 MCG tablet Take 1 tablet (112 mcg total) by mouth daily. 30 tablet 0     UNABLE TO FIND Take 2 tablets by mouth daily. Med Name: Juice Plus Supplement (Green)       UNABLE TO FIND Take 2 tablets by mouth daily. Med Name: Juice Plus Supplement (Red)       warfarin (COUMADIN) 5 MG tablet Take up to 1-1/2 tablets (5.0 to 7.5 mg) by mouth daily. Adjust dose based on INR results as directed. 120 tablet 3     warfarin (COUMADIN) 7.5 MG tablet Take 7.5 mg by mouth.       No current facility-administered medications for this visit.         Allergies   Allergen Reactions     Adhesive      Atorvastatin Myalgia     Peanut Hives       Patient Active Problem List   Diagnosis     Hypothyroidism     Splenic Infarct     Arterial fibromuscular dysplasia (H)     Splenic infarct     Lupus anticoagulant disorder (H)     Aneurysm of renal artery (H)     Hypertension       Past Medical History:   Diagnosis Date     Atypical Chest Pain     Created by Conversion      Bronchospasm     Created by Conversion      Dysphagia     Created by Conversion        Past Surgical History:   Procedure Laterality Date     EXPLORATORY LAPAROTOMY       LAPAROSCOPIC CHOLECYSTECTOMY       TOTAL VAGINAL HYSTERECTOMY  1992    endometriosis       Social History     Socioeconomic History     Marital status:      Spouse name: Aron     Number of children: 3     Years of education: Not on file     Highest education level: Not on file   Social Needs     Financial resource strain: Not on file     Food insecurity - worry: Not on file     Food insecurity - inability: Not on file     Transportation needs - medical: Not on file  "    Transportation needs - non-medical: Not on file   Occupational History     Occupation: RN at [employer] [place]     Employer: [employer]   Tobacco Use     Smoking status: Never Smoker     Smokeless tobacco: Never Used   Substance and Sexual Activity     Alcohol use: No     Drug use: No     Sexual activity: Yes     Partners: Male     Birth control/protection: Surgical   Other Topics Concern     Not on file   Social History Narrative     Not on file       Patient Care Team:  Chris Pan MD as PCP - General (Family Medicine)          Objective:     Vitals:    12/07/18 1421   BP: 130/78   Pulse: 64   Weight: 190 lb 4 oz (86.3 kg)   Height: 5' 6.75\" (1.695 m)         Physical Exam:  Physical Exam  Constitutional:    --Vitals as above  --No acute distress  Eyes-  --Sclera noninjected  --Lids and conjunctiva normal  ENT-  --TMs clear  --Sclera noninjected  --Pharynx not erythematous  Neck-  --Neck supple with no cervical lymphadenopathy  Lungs-  --Clear to Auscultation  Heart-  --Regular rate and rhythm  Abdomen--  --Soft, non-tender, non-distended  Skin-  --Pink and dry  Psych-  --Alert and oriented  --Normal affect    There are no Patient Instructions on file for this visit.        Labs:  Labs pending at this time.  Results will be reviewed when available.    Immunization History   Administered Date(s) Administered     DT (pediatric) 09/03/2003     Td,adult,historic,unspecified 09/03/2003, 02/14/2014     Tdap 02/14/2014     Thank you for the opportunity to participate in the care for this patient.  If you have any concerns please do not hesitate to contact me at the [facility] at [phone].        Electronically signed by Chris Pan MD 12/07/18 2:26 PM  "

## 2021-06-22 NOTE — TELEPHONE ENCOUNTER
ANTICOAGULATION  MANAGEMENT    Assessment     Today's INR result of 2.70 is Therapeutic (goal INR of 2.0-3.0)        Warfarin taken as previously instructed    No new diet changes affecting INR    Interaction between Tylenol and warfarin may be affecting INR    Continues to tolerate warfarin with no reported s/s of bleeding or thromboembolism     Previous INR was Supratherapeutic    Plan:     Left a detailed message for Silvia regarding INR result and instructed:     Warfarin Dosing Instructions:  Continue current warfarin dose 7.5 mg daily on SUN / WED; and 5 mg daily rest of week  (0 % change)    Instructed patient to follow up no later than: 2 weeks    Education provided: potential interaction between warfarin and Tylenol    Instructed to call the [facility] for any changes, questions or concerns. (#[phone])   ?   Maite Johnson RN    Subjective/Objective:      Silvia Martinez, a 58 y.o. female is on warfarin.     Silvia reports:     Home warfarin dose: as updated on anticoagulation calendar per template     Missed doses: No     Medication changes:  Yes: Tylenol PRN     S/S of bleeding or thromboembolism:  No     New Injury or illness:  No     Changes in diet or alcohol consumption:  No     Upcoming surgery, procedure or cardioversion:  No    Anticoagulation Episode Summary     Current INR goal:   2.0-3.0   TTR:   86.7 % (4 y)   Next INR check:   1/18/2019   INR from last check:   2.70 (1/4/2019)   Weekly max warfarin dose:      Target end date:   Indefinite   INR check location:      Preferred lab:      Send INR reminders to:   ANTICOAGULATION POOL C (DTN,VAD,CGR,GAV)    Indications    Splenic infarct [D73.5]           Comments:            Anticoagulation Care Providers     Provider Role Specialty Phone number    Jose Ramon Bolton MD Referring Family Medicine [phone]    Chris Pan MD Responsible Family Medicine [phone]

## 2021-06-23 NOTE — TELEPHONE ENCOUNTER
MANT[place]X TUBERCULIN (a.k.a. TB) SKIN TEST      What is Tuberculosis/TB?  Tuberculosis/TB is an infectious disease, which is spread through the air by tiny germs when people cough, sneeze, speak or sing. TB germs are very small and can remain in the air for a long period of time. TB germs most oft  en settle in your lungs, but may also settle in other organs of your body. TB germs can be present in your body without making you ill. This is called TB INFECTION. TB infection cannot be spread to other people. When your  body’s defenses become weak and can no longer control the TB germs they multiply, this is called TB DISEASE. It can take month(s) to year(s) for TB infection to become TB disease. The TB SKIN TEST can show if a person has  been “infected” by TB germs.    How is the Mantoux Tuberculin/TB skin test given?  A very small amount of a product called Purified Protein Derivative (PPD) is injected just under the top layer of the skin on the forearm. Persons who have been infected by the TB germs usually react to the test by developing swelling at the injection site. The skin test results must be read 48 to 72 hours after given. Failure to return within this time frame means the test will need to be repeated.    Side effects…  Side effects from a skin test are rare and may include itching and discomfort at the injection site and very rarely the possibility of a blister, ulceration or necrosis (dead tissue) at the site if the injection.    Patient instructed to return in 48 to 72 hours for reading Friday 9:45am-Saturday 9:45am.   Telephone Encounter by Kathy Chapin RN at 1/19/2021  2:10 PM     Author: Kathy Chapin RN Service: -- Author Type: Registered Nurse    Filed: 1/19/2021  2:31 PM Encounter Date: 1/19/2021 Status: Signed    : Kathy Chapin RN (Registered Nurse)       ANTICOAGULATION  MANAGEMENT    Assessment     Today's INR result of 3.1 is Supratherapeutic (goal INR of 2.0-3.0)        Warfarin taken as previously instructed     No new diet changes affecting INR    Interaction between NyQuil Cold/Flu and Coricidin HBP Cold/Flu and warfarin may be affecting INR     INR checked today was unscheduled, patient worried about medications interacting with warfarin so wanted to check sooner    Continues to tolerate warfarin with no reported s/s of bleeding or thromboembolism     Previous INR was Therapeutic    Plan:     Spoke on phone with Silvia regarding INR result and instructed:     Warfarin Dosing Instructions: Discussed with pharmacist. Continue current warfarin dose 7.5 mg daily on Sun/Wed; and 5 mg daily rest of week  (0 % change)    Instructed patient to follow up no later than: 1/26    Education provided: target INR goal and significance of current INR result and potential interaction between warfarin and NyQuil/Coricidin     Silvia verbalizes understanding and agrees to warfarin dosing plan.    Instructed to call the [facility] for any changes, questions or concerns. (#[phone])   ?   Kathy Chapin RN    Subjective/Objective:      Silvia Martinez, a 60 y.o. female is on warfarin.     Silvia reports:     Home warfarin dose: as updated on anticoagulation calendar per template     Missed doses: No     Medication changes:  Yes: NyQuil started QHS, Coricidin QAM, both x1 week     S/S of bleeding or thromboembolism:  No     New Injury or illness:  Yes: congestion, about a week off and on     Changes in diet or alcohol consumption:  No     Upcoming surgery, procedure or cardioversion:  No    Anticoagulation  Episode Summary     Current INR goal:  2.0-3.0   TTR:  91.7 % (1 y)   Next INR check:  2/2/2021   INR from last check:  3.10 (1/19/2021)   Weekly max warfarin dose:     Target end date:  Indefinite   INR check location:     Preferred lab:     Send INR reminders to:  [facility] [place]    Indications    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

## 2021-06-23 NOTE — TELEPHONE ENCOUNTER
ANTICOAGULATION  MANAGEMENT    Assessment     Today's INR result of 2.80 is Therapeutic (goal INR of 2.0-3.0)        Warfarin taken as previously instructed    No new diet changes affecting INR    Interaction between Coricidin and warfarin may be affecting INR    Continues to tolerate warfarin with no reported s/s of bleeding or thromboembolism     Previous INR was Therapeutic    Plan:     Left a detailed message for Silvia regarding INR result and instructed:     Warfarin Dosing Instructions:  Continue current warfarin dose 7.5 mg daily on SUN / WED; and 5 mg daily rest of week  (0 % change)    Instructed patient to follow up no later than: 2-4 weeks    Education provided: potential interaction between warfarin and Coricidin and importance of notifying clinic for diarrhea, nausea/vomiting, reduced intake and/or illness    Instructed to call the [facility] for any changes, questions or concerns. (#[phone])   ?   Maite Johnson RN    Subjective/Objective:      Silvia Martinez, a 58 y.o. female is on warfarin.     Silvia reports:     Home warfarin dose: as updated on anticoagulation calendar per template     Missed doses: No     Medication changes:  Yes: OTC Coricidin     S/S of bleeding or thromboembolism:  No     New Injury or illness:  Yes: slight cold symptoms     Changes in diet or alcohol consumption:  No     Upcoming surgery, procedure or cardioversion:  No    Anticoagulation Episode Summary     Current INR goal:   2.0-3.0   TTR:   86.8 % (4 y)   Next INR check:   2/15/2019   INR from last check:   2.80 (1/18/2019)   Weekly max warfarin dose:      Target end date:   Indefinite   INR check location:      Preferred lab:      Send INR reminders to:   ANTICOAGULATION POOL C (DTN,VAD,CGR,GAV)    Indications    Splenic infarct [D73.5]           Comments:            Anticoagulation Care Providers     Provider Role Specialty Phone number    Jose Ramon Bolton MD Referring Family Medicine [phone]    Chris Pan,  MD [facility] [phone]

## 2021-06-24 NOTE — TELEPHONE ENCOUNTER
ANTICOAGULATION  MANAGEMENT    Assessment     Today's INR result of 2.20 is Therapeutic (goal INR of 2.0-3.0)        Warfarin taken as previously instructed    No new diet changes affecting INR    No new medication/supplements affecting INR    Continues to tolerate warfarin with no reported s/s of bleeding or thromboembolism     Previous INR was Therapeutic    Plan:     Left a detailed message for Silvia regarding INR result and instructed:     Warfarin Dosing Instructions:  Continue current warfarin dose 7.5 mg daily on SUN / WED; and 5 mg daily rest of week  (0 % change)    Instructed patient to follow up no later than: 4 weeks    Education provided: target INR goal and significance of current INR result        Instructed to call the [facility] for any changes, questions or concerns. (#[phone])   ?   Maite Johnson RN    Subjective/Objective:      Silvia Martinez, a 58 y.o. female is on warfarin.     Silvia reports:     Home warfarin dose: as updated on anticoagulation calendar per template     Missed doses: No     Medication changes:  No     S/S of bleeding or thromboembolism:  No     New Injury or illness:  No     Changes in diet or alcohol consumption:  No     Upcoming surgery, procedure or cardioversion:  No    Anticoagulation Episode Summary     Current INR goal:   2.0-3.0   TTR:   87.1 % (4.1 y)   Next INR check:   3/18/2019   INR from last check:   2.20 (2/18/2019)   Weekly max warfarin dose:      Target end date:   Indefinite   INR check location:      Preferred lab:      Send INR reminders to:   ANTICOAGULATION POOL C (DTN,VAD,CGR,GAV)    Indications    Splenic infarct [D73.5]           Comments:            Anticoagulation Care Providers     Provider Role Specialty Phone number    Jose Ramon Bolton MD Referring Family Medicine [phone]    Chris Pan MD Responsible Family Medicine [phone]

## 2021-06-25 NOTE — TELEPHONE ENCOUNTER
ANTICOAGULATION  MANAGEMENT    Assessment     Today's INR result of 2.00 is Therapeutic (goal INR of 2.0-3.0)        Warfarin taken as previously instructed    No new diet changes affecting INR    No new medication/supplements affecting INR    Continues to tolerate warfarin with no reported s/s of bleeding or thromboembolism     Previous INR was Therapeutic    Plan:     Left a detailed message for Silvia regarding INR result and instructed:     Warfarin Dosing Instructions:  Continue current warfarin dose 7.5 mg daily on SUN / WED; and 5 mg daily rest of week  (0 % change)    Instructed patient to follow up no later than: 4-6 weeks    Education provided: importance of notifying clinic for changes in medications    Instructed to call the [facility] for any changes, questions or concerns. (#[phone])   ?   Maite Johnson RN    Subjective/Objective:      Silvia Martinez, a 58 y.o. female is on warfarin.     Silvia reports:     Home warfarin dose: as updated on anticoagulation calendar per template     Missed doses: No     Medication changes:  No     S/S of bleeding or thromboembolism:  No     New Injury or illness:  No     Changes in diet or alcohol consumption:  No     Upcoming surgery, procedure or cardioversion:  No    Anticoagulation Episode Summary     Current INR goal:   2.0-3.0   TTR:   87.3 % (4.2 y)   Next INR check:   5/2/2019   INR from last check:   2.00 (3/21/2019)   Weekly max warfarin dose:      Target end date:   Indefinite   INR check location:      Preferred lab:      Send INR reminders to:   ANTICOAGULATION POOL C (DTN,VAD,CGR,GAV)    Indications    Splenic infarct [D73.5]           Comments:            Anticoagulation Care Providers     Provider Role Specialty Phone number    Jose Ramon Bolton MD Referring Family Medicine [phone]    Chris Pan MD Responsible Family Medicine [phone]

## 2021-06-26 NOTE — TELEPHONE ENCOUNTER
ANTICOAGULATION MANAGEMENT     Silvia Martinez 61 y.o., female is on warfarin with Therapeutic INR result (goal range 2.0-3.0)    Recent labs: (last 7 days)     06/22/21  0826   INR 2.50*       ASSESSMENT     Source: Template      Warfarin dosing taken: Warfarin taken as instructed    Diet: No new diet changes affecting INR    Illness, Injury or hospitalization: No    Medication changes: None    Signs or symptoms of bleeding or clotting: No    Previous INR: supratherapeutic    Additional findings: None     PLAN     Recommended plan for no diet, medication or health factor changes affecting INR:     Dosing instructions: Continue your current warfarin dose 7.5 mg daily on Sunday/Wednesday; and 5 mg daily rest of week    Follow up no later than: 4 weeks     Detailed voice message left for Silvia with dosing instructions and follow up date.  Sent my chart message as well.    Left detailed message with recommended recheck date    Education provided: importance of therapeutic range, target INR goal and significance of current INR result, importance of following up for INR monitoring at instructed interval and importance of taking warfarin as instructed    Plan made per [facility] anticoagulation protocol    Maite Wood Vici  Anticoagulation Clinic   [phone]

## 2021-07-04 NOTE — TELEPHONE ENCOUNTER
Telephone Encounter by Gabrielle Britton RN at 5/28/2021 10:30 AM     Author: Gabrielle Britton RN Service: -- Author Type: Registered Nurse    Filed: 5/28/2021 10:50 AM Encounter Date: 5/28/2021 Status: Addendum    : Gabrielle Britton RN (Registered Nurse)    Related Notes: Original Note by Gabrielle Britton RN (Registered Nurse) filed at 5/28/2021 10:39 AM       ANTICOAGULATION  MANAGEMENT    Assessment     Today's INR result of 3.2 is Supratherapeutic (goal INR of 2.0-3.0)        Warfarin taken as previously instructed    Decreased greens/vitamin K intake may be affecting INR    No new medication/supplements affecting INR    Continues to tolerate warfarin with no reported s/s of bleeding or thromboembolism     Previous INR was Therapeutic x2    Plan:     Spoke on the phone with Silvia regarding INR result and instructed:      Warfarin Dosing Instructions:  Continue current warfarin dose    7.5 mg every Sun, Wed; 5 mg all other days     (0 % change)    Instructed patient to follow up no later than: 2 weeks  Per patient she will have her INR done on 6/22 as she already scheduled, she stated that her INR's have been therapeutic and she will eat greens back to routine to bring her INR back to range.    Education provided: importance of therapeutic range, target INR goal and significance of current INR result and importance of following up for INR monitoring at instructed interval      Silvia verbalized understanding and agrees to plan.    Instructed to call the [facility] for any changes, questions or concerns. (#[phone])   ?   Gabrielle Britton RN    Subjective/Objective:      Silvia DESTIN Juan, a 61 y.o. female is on warfarin. Silvia Vega reports:     Home warfarin dose: as updated on anticoagulation calendar per template     Missed doses: No     Medication changes:  No     S/S of bleeding or thromboembolism:  No     New Injury or illness:  No     Changes in diet or alcohol consumption:  No     Upcoming  surgery, procedure or cardioversion:  No    Anticoagulation Episode Summary     Current INR goal:  2.0-3.0   TTR:  91.6 % (1 y)   Next INR check:  6/11/2021   INR from last check:  3.20 (5/28/2021)   Weekly max warfarin dose:     Target end date:  Indefinite   INR check location:     Preferred lab:     Send INR reminders to:  [facility]    Indications    Splenic infarct [D73.5]           Comments:           Anticoagulation Care Providers     Provider Role Specialty Phone number    Jose Ramon Bolton MD Referring Family Medicine [phone]    Chris Pan MD Responsible Family Medicine [phone]

## 2021-07-15 DIAGNOSIS — D68.62 LUPUS ANTICOAGULANT DISORDER (H): Primary | ICD-10-CM

## 2021-07-15 NOTE — PROGRESS NOTES
INR standing order entered.    Gabrielle Britton RN [facility]    [id number]

## 2021-07-20 ENCOUNTER — LAB (OUTPATIENT)
Dept: LAB | Facility: CLINIC | Age: 61
End: 2021-07-20
Payer: COMMERCIAL

## 2021-07-20 ENCOUNTER — ANTICOAGULATION THERAPY VISIT (OUTPATIENT)
Dept: ANTICOAGULATION | Facility: CLINIC | Age: 61
End: 2021-07-20

## 2021-07-20 DIAGNOSIS — D73.5 SPLENIC INFARCT: Primary | ICD-10-CM

## 2021-07-20 DIAGNOSIS — D68.62 LUPUS ANTICOAGULANT DISORDER (H): ICD-10-CM

## 2021-07-20 LAB — INR BLD: 2.6 (ref 0.9–1.1)

## 2021-07-20 PROCEDURE — 36416 COLLJ CAPILLARY BLOOD SPEC: CPT

## 2021-07-20 PROCEDURE — 85610 PROTHROMBIN TIME: CPT

## 2021-07-20 NOTE — PROGRESS NOTES
ANTICOAGULATION MANAGEMENT     Silvia Martinez 61 year old female is on warfarin with therapeutic INR result. (Goal INR 2.0-3.0)    Recent labs: (last 7 days)     07/20/21  1134   INR 2.6*       ASSESSMENT     Source(s): Template       Warfarin doses taken: Warfarin taken as instructed    Diet: No new diet changes identified    New illness, injury, or hospitalization: No    Medication/supplement changes: None noted    Signs or symptoms of bleeding or clotting: No    Previous INR: Therapeutic last 2(+) visits    Additional findings: None     PLAN     Recommended plan for no diet, medication or health factor changes affecting INR     Dosing Instructions: Continue your current warfarin dose with next INR in 4 weeks       Summary  As of 7/20/2021    Full warfarin instructions:  7.5 mg every Sun, Wed; 5 mg all other days   Next INR check:  8/17/2021             Detailed voice message left for Silvia with dosing instructions and follow up date.     Contact [phone] to schedule and with any changes, questions or concerns.     Education provided: Importance of therapeutic range, Importance of following up for INR monitoring at instructed interval and Importance of taking warfarin as instructed    Plan made per [facility] anticoagulation protocol    Gabrielle Britton RN  Anticoagulation Clinic  7/20/2021    _______________________________________________________________________     Anticoagulation Episode Summary     Current INR goal:  2.0-3.0   TTR:  89.7 % (1 y)   Target end date:  Indefinite   Send INR reminders to:  [facility]    Indications    Splenic infarct [D73.5]           Comments:           Anticoagulation Care Providers     Provider Role Specialty Phone number    Jose Ramon Bolton MD Referring Family Medicine [phone]    Chris Pan MD Responsible Family Medicine [phone]

## 2021-08-17 ENCOUNTER — ANTICOAGULATION THERAPY VISIT (OUTPATIENT)
Dept: ANTICOAGULATION | Facility: CLINIC | Age: 61
End: 2021-08-17

## 2021-08-17 ENCOUNTER — LAB (OUTPATIENT)
Dept: LAB | Facility: CLINIC | Age: 61
End: 2021-08-17
Payer: COMMERCIAL

## 2021-08-17 DIAGNOSIS — D73.5 SPLENIC INFARCT: Primary | ICD-10-CM

## 2021-08-17 DIAGNOSIS — D68.62 LUPUS ANTICOAGULANT DISORDER (H): ICD-10-CM

## 2021-08-17 LAB — INR BLD: 3 (ref 0.9–1.1)

## 2021-08-17 PROCEDURE — 36415 COLL VENOUS BLD VENIPUNCTURE: CPT

## 2021-08-17 PROCEDURE — 85610 PROTHROMBIN TIME: CPT

## 2021-08-17 NOTE — PROGRESS NOTES
ANTICOAGULATION MANAGEMENT     Silvia Martinez 61 year old female is on warfarin with therapeutic INR result. (Goal INR 2.0-3.0)    Recent labs: (last 7 days)     08/17/21  1130   INR 3.0*       ASSESSMENT     Source(s): Chart Review, Patient/Caregiver Call and Template       Warfarin doses taken: Warfarin taken as instructed    Diet: Decreased greens/vitamin K in diet; plans to resume previous intake and Change in alcohol intake may be affecting INR. Pt reports less greens and drinking some wine over the weekend at family reunion.      New illness, injury, or hospitalization: No    Medication/supplement changes: None noted    Signs or symptoms of bleeding or clotting: No    Previous INR: Therapeutic last 2(+) visits    Additional findings: None     PLAN     Recommended plan for no diet, medication or health factor changes affecting INR     Dosing Instructions: Continue your current warfarin dose with next INR in 5 weeks       Summary  As of 8/17/2021    Full warfarin instructions:  7.5 mg every Sun, Wed; 5 mg all other days   Next INR check:  9/21/2021             Telephone call with Silvia who verbalizes understanding and agrees to plan    Patient elected to schedule next visit 9/21/21    Education provided: Importance of consistent vitamin K intake, Potential interaction between warfarin and alcohol and Target INR goal and significance of current INR result    Plan made per [facility]agulation protocol    Lavern Porras RN  Anticoagulation Clinic  8/17/2021    _______________________________________________________________________     Anticoagulation Episode Summary     Current INR goal:  2.0-3.0   TTR:  89.7 % (1 y)   Target end date:  Indefinite   Send INR reminders to:  [facility]    Indications    Splenic infarct [D73.5]           Comments:           Anticoagulation Care Providers     Provider Role Specialty Phone number    Jose Ramon Bolton MD Referring Family Medicine [phone]    Chris Pan,  MD [facility] [phone]

## 2021-08-22 ENCOUNTER — HEALTH MAINTENANCE LETTER (OUTPATIENT)
Age: 61
End: 2021-08-22

## 2021-08-31 ENCOUNTER — TRANSFERRED RECORDS (OUTPATIENT)
Dept: HEALTH INFORMATION MANAGEMENT | Facility: CLINIC | Age: 61
End: 2021-08-31

## 2021-09-22 ENCOUNTER — LAB (OUTPATIENT)
Dept: LAB | Facility: CLINIC | Age: 61
End: 2021-09-22
Payer: COMMERCIAL

## 2021-09-22 ENCOUNTER — ANTICOAGULATION THERAPY VISIT (OUTPATIENT)
Dept: FAMILY MEDICINE | Facility: CLINIC | Age: 61
End: 2021-09-22

## 2021-09-22 DIAGNOSIS — D68.62 LUPUS ANTICOAGULANT DISORDER (H): ICD-10-CM

## 2021-09-22 DIAGNOSIS — D73.5 SPLENIC INFARCT: Primary | ICD-10-CM

## 2021-09-22 LAB — INR BLD: 2.3 (ref 0.9–1.1)

## 2021-09-22 PROCEDURE — 85610 PROTHROMBIN TIME: CPT

## 2021-09-22 PROCEDURE — 36415 COLL VENOUS BLD VENIPUNCTURE: CPT

## 2021-09-22 NOTE — PROGRESS NOTES
ANTICOAGULATION MANAGEMENT     Silvia Martinez 61 year old female is on warfarin with therapeutic INR result. (Goal INR 2.0-3.0)    Recent labs: (last 7 days)     09/22/21  1536   INR 2.3*       ASSESSMENT     Source(s): Chart Review and Template       Warfarin doses taken: Warfarin taken as instructed    Diet: No new diet changes identified    New illness, injury, or hospitalization: No    Medication/supplement changes: None noted    Signs or symptoms of bleeding or clotting: No    Previous INR: Therapeutic last 2(+) visits    Additional findings: None     PLAN     Recommended plan for no diet, medication or health factor changes affecting INR     Dosing Instructions: Continue your current warfarin dose with next INR in 6 weeks       Summary  As of 9/22/2021    Full warfarin instructions:  7.5 mg every Sun, Wed; 5 mg all other days   Next INR check:  11/3/2021             Detailed voice message left for Silvia with dosing instructions and follow up date.     Contact [phone] to schedule and with any changes, questions or concerns.     Education provided: Goal range and significance of current result    Plan made per [facility]agulation protocol    Tara Forbes RN  Anticoagulation Clinic  9/22/2021    _______________________________________________________________________     Anticoagulation Episode Summary     Current INR goal:  2.0-3.0   TTR:  89.7 % (1 y)   Target end date:  Indefinite   Send INR reminders to:  [facility]    Indications    Splenic infarct [D73.5]           Comments:           Anticoagulation Care Providers     Provider Role Specialty Phone number    Jose Ramon Bolton MD Referring Family Medicine [phone]    Chris Pan MD Responsible Family Medicine [phone]

## 2021-09-24 ENCOUNTER — DOCUMENTATION ONLY (OUTPATIENT)
Dept: URGENT CARE | Facility: URGENT CARE | Age: 61
End: 2021-09-24
Payer: COMMERCIAL

## 2021-09-24 DIAGNOSIS — D73.5 SPLENIC INFARCT: Primary | ICD-10-CM

## 2021-09-24 NOTE — PROGRESS NOTES
ANTICOAGULATION MANAGEMENT      Silvia Martinez due for annual renewal of referral to anticoagulation monitoring. Order pended for your review and signature.      ANTICOAGULATION SUMMARY      Warfarin indication(s)     Splenic infarct    Heart valve present?  NO       Current goal range   INR: 2.0-3.0     Goal appropriate for indication? Yes, INR 2-3 appropriate for hx of DVT, PE, hypercoagulable state, Afib, LVAD, or bileaflet AVR without risk factors     Current duration of therapy Indefinite/long term therapy   Time in Therapeutic Range (TTR)  (Goal > 60%) 89.7%       Office visit with referring provider's group within last year no appears overdue for yearly appt       Yoly Ge RN

## 2021-10-17 ENCOUNTER — HEALTH MAINTENANCE LETTER (OUTPATIENT)
Age: 61
End: 2021-10-17

## 2021-10-26 ENCOUNTER — ANTICOAGULATION THERAPY VISIT (OUTPATIENT)
Dept: ANTICOAGULATION | Facility: CLINIC | Age: 61
End: 2021-10-26

## 2021-10-26 ENCOUNTER — LAB (OUTPATIENT)
Dept: LAB | Facility: CLINIC | Age: 61
End: 2021-10-26
Payer: COMMERCIAL

## 2021-10-26 DIAGNOSIS — D68.62 LUPUS ANTICOAGULANT DISORDER (H): ICD-10-CM

## 2021-10-26 DIAGNOSIS — D73.5 SPLENIC INFARCT: Primary | ICD-10-CM

## 2021-10-26 LAB — INR BLD: 2.9 (ref 0.9–1.1)

## 2021-10-26 PROCEDURE — 85610 PROTHROMBIN TIME: CPT

## 2021-10-26 PROCEDURE — 36415 COLL VENOUS BLD VENIPUNCTURE: CPT

## 2021-10-26 NOTE — PROGRESS NOTES
ANTICOAGULATION MANAGEMENT     Silvia Martinez 61 year old female is on warfarin with therapeutic INR result. (Goal INR 2.0-3.0)    Recent labs: (last 7 days)     10/26/21  1009   INR 2.9*       ASSESSMENT     Source(s): Chart Review and Template       Warfarin doses taken: Warfarin taken as instructed    Diet: No new diet changes identified    New illness, injury, or hospitalization: No    Medication/supplement changes: None noted    Signs or symptoms of bleeding or clotting: No    Previous INR: Therapeutic last 2(+) visits    Additional findings: None     PLAN     Recommended plan for no diet, medication or health factor changes affecting INR     Dosing Instructions: Continue your current warfarin dose with next INR in 6 weeks       Summary  As of 10/26/2021    Full warfarin instructions:  7.5 mg every Sun, Wed; 5 mg all other days   Next INR check:  12/7/2021             Detailed voice message left for Silvia with dosing instructions and follow up date. Pt already scheduled next INR for 11/30/21.     Contact [phone] to schedule and with any changes, questions or concerns.     Education provided: Please call back if any changes to your diet, medications or how you've been taking warfarin    Plan made per [facility]agulation protocol    Lavern Porras RN  Anticoagulation Clinic  10/26/2021    _______________________________________________________________________     Anticoagulation Episode Summary     Current INR goal:  2.0-3.0   TTR:  94.0 % (1 y)   Target end date:  Indefinite   Send INR reminders to:  [facility]    Indications    Splenic infarct [D73.5]           Comments:           Anticoagulation Care Providers     Provider Role Specialty Phone number    Jose Ramon Bolton MD Referring Family Medicine [phone]    Chris Pan MD Responsible Family Medicine [phone]

## 2021-11-27 ENCOUNTER — OFFICE VISIT (OUTPATIENT)
Dept: FAMILY MEDICINE | Facility: CLINIC | Age: 61
End: 2021-11-27
Payer: COMMERCIAL

## 2021-11-27 VITALS
BODY MASS INDEX: 31.04 KG/M2 | WEIGHT: 196.7 LBS | DIASTOLIC BLOOD PRESSURE: 64 MMHG | OXYGEN SATURATION: 98 % | SYSTOLIC BLOOD PRESSURE: 124 MMHG | HEART RATE: 74 BPM

## 2021-11-27 DIAGNOSIS — J40 BRONCHITIS: ICD-10-CM

## 2021-11-27 DIAGNOSIS — H10.32 ACUTE CONJUNCTIVITIS OF LEFT EYE, UNSPECIFIED ACUTE CONJUNCTIVITIS TYPE: Primary | ICD-10-CM

## 2021-11-27 PROCEDURE — 99213 OFFICE O/P EST LOW 20 MIN: CPT | Performed by: FAMILY MEDICINE

## 2021-11-27 RX ORDER — PREDNISONE 20 MG/1
20 TABLET ORAL DAILY
Qty: 5 TABLET | Refills: 0 | Status: SHIPPED | OUTPATIENT
Start: 2021-11-27 | End: 2021-12-02

## 2021-11-27 RX ORDER — LEVOTHYROXINE SODIUM 100 MCG
TABLET ORAL
COMMUNITY
Start: 2021-10-18 | End: 2022-01-14

## 2021-11-27 RX ORDER — AZITHROMYCIN 250 MG/1
TABLET, FILM COATED ORAL
Qty: 6 TABLET | Refills: 0 | Status: SHIPPED | OUTPATIENT
Start: 2021-11-27 | End: 2021-12-02

## 2021-11-27 RX ORDER — TOBRAMYCIN 3 MG/ML
1-2 SOLUTION/ DROPS OPHTHALMIC
Qty: 5 ML | Refills: 0 | Status: SHIPPED | OUTPATIENT
Start: 2021-11-27 | End: 2022-01-14

## 2021-11-27 NOTE — PROGRESS NOTES
Chief Complaint   Patient presents with     Nasal Congestion     Cough     Pharyngitis     Eye Problem     left eye red        Subjective:    Silvia is a 61 year old female presenting with cough symptoms:  Location: chest  Quality: productive of green/yellow sputum  Severity: moderate  Duration: over a week  Timing: onset as cold symptoms that are steadily worsening  Modifying Factors: not better with over the counter meds  Associated Symptoms: no fevers or wheezing or shortness of breath    Social History:  Social History     Tobacco Use     Smoking status: Never Smoker     Smokeless tobacco: Never Used   Substance Use Topics     Alcohol use: No       Allergies:  Allergies   Allergen Reactions     Adhesive [Mecrylate] Unknown     Atorvastatin Muscle Pain (Myalgia)     Peanut [Peanut Oil] Hives     Peanut [Peanut Oil] Hives       Review of Systems:  Constitutional:No fevers, chills, or myalgias.  Cardiac:No chest pains or palpitations  Respitatory: no hemoptysis or shortness of breath     Other than noted above, all other pertinant system review is unremarkable.    Objective:   /64 (BP Location: Right arm, Patient Position: Sitting, Cuff Size: Adult Regular)   Pulse 74   Wt 89.2 kg (196 lb 11.2 oz)   SpO2 98%   BMI 31.04 kg/m    General: Patient is well nourished, alert and oriented in no acute distress.  Normal mood and affect.  Appropiate judgement and insight.  Eyes: normal lids -conjunctiva reddened in left eye  Throat:normal  Neck: supple without any masses or lymphadenopathy.  Normal thyroid.  Lungs: normal respiratory effort.  Clear to auscultation and percussion throughout.  Cardiac: normal S1 and S2 without any murmur, gallops or rubs.     Assessment/Plan:     Acute conjunctivitis of left eye, unspecified acute conjunctivitis type  Bronchitis  - new diagnosis - Amoxicillin  - Prednisone 20 mg daily x 5 days.    - follow-up if symptoms worsen or fail to improve.  (H10.32) Acute conjunctivitis of left  eye, unspecified acute conjunctivitis type  (primary encounter diagnosis)  Comment:   Plan: azithromycin (ZITHROMAX) 250 MG tablet,         predniSONE (DELTASONE) 20 MG tablet, tobramycin        (TOBREX) 0.3 % ophthalmic solution            (J40) Bronchitis  Comment:   Plan: azithromycin (ZITHROMAX) 250 MG tablet,         predniSONE (DELTASONE) 20 MG tablet, tobramycin        (TOBREX) 0.3 % ophthalmic solution

## 2021-11-29 ENCOUNTER — TELEPHONE (OUTPATIENT)
Dept: ANTICOAGULATION | Facility: CLINIC | Age: 61
End: 2021-11-29
Payer: COMMERCIAL

## 2021-11-29 NOTE — TELEPHONE ENCOUNTER
ANTICOAGULATION  MANAGEMENT     Interacting Medication Review    Interacting medication(s): Azithromycin (Zithromax, Z-ursula) with warfarin.    Duration: 5 days (11/27 to 12/1)    Indication: acute conjunctivitis    New medication?: Yes, interaction may increase INR and risk of bleeding   Also on Prednisone 20 mg daily for 5 days which can increase/decrease INR       PLAN     Continue current warfarin dose. Recommend to check INR on 11/30    Patient was not contacted ~ due to patient already has a scheduled appointment for 11/30.    No adjustment to Anticoagulation Calendar was required    Gabrielle Britton RN

## 2021-11-30 ENCOUNTER — ANTICOAGULATION THERAPY VISIT (OUTPATIENT)
Dept: ANTICOAGULATION | Facility: CLINIC | Age: 61
End: 2021-11-30

## 2021-11-30 ENCOUNTER — LAB (OUTPATIENT)
Dept: LAB | Facility: CLINIC | Age: 61
End: 2021-11-30
Payer: COMMERCIAL

## 2021-11-30 DIAGNOSIS — D73.5 SPLENIC INFARCT: Primary | ICD-10-CM

## 2021-11-30 DIAGNOSIS — D68.62 LUPUS ANTICOAGULANT DISORDER (H): ICD-10-CM

## 2021-11-30 LAB — INR BLD: 3.1 (ref 0.9–1.1)

## 2021-11-30 PROCEDURE — 85610 PROTHROMBIN TIME: CPT

## 2021-11-30 PROCEDURE — 36415 COLL VENOUS BLD VENIPUNCTURE: CPT

## 2021-11-30 NOTE — PROGRESS NOTES
ANTICOAGULATION MANAGEMENT     Silvia Martinez 61 year old female is on warfarin with supratherapeutic INR result. (Goal INR 2.0-3.0)    Recent labs: (last 7 days)     11/30/21  1047   INR 3.1*       ASSESSMENT     Source(s): Chart Review, Patient/Caregiver Call and Template       Warfarin doses taken: Warfarin taken as instructed    Diet: No new diet changes identified    New illness, injury, or hospitalization: Yes: bronchitis    Medication/supplement changes: zpak 5 day course (dates: 11/27-12/1) which has potential for interaction; increasing INR     Prednisone 20 mg daily x 5 days potential interaction may increase/decrease INR    Nyquil as needed has potential interaction of increasing INR due to acetaminophen content    Signs or symptoms of bleeding or clotting: No    Previous INR: Therapeutic last 2(+) visits    Additional findings: None     PLAN     Recommended plan for temporary change(s) affecting INR     Dosing Instructions: Partial hold then continue your current warfarin dose with next INR in 2 weeks       Summary  As of 11/30/2021    Full warfarin instructions:  11/30: 2.5 mg; Otherwise 7.5 mg every Sun, Wed; 5 mg all other days   Next INR check:  12/14/2021             Detailed voice message left for Silvia with dosing instructions and follow up date.     Lab visit scheduled already by patient for 12/14- instructed to come in sooner if signs and symptoms of bleeding is noted.    Education provided: Potential interaction between warfarin and prednisone,zithromycin, nyquil, Monitoring for bleeding signs and symptoms and Contact [phone] with any changes, questions or concerns.     Plan made per [facility] anticoagulation protocol    Gabrielle Britton RN  Anticoagulation Clinic  11/30/2021    _______________________________________________________________________     Anticoagulation Episode Summary     Current INR goal:  2.0-3.0   TTR:  89.2 % (1 y)   Target end date:  Indefinite   Send INR reminders to:  [facility]    Indications    Splenic infarct [D73.5]           Comments:           Anticoagulation Care Providers     Provider Role Specialty Phone number    Jose Ramon Bolton MD Referring Family Medicine [phone]    Chris Pan MD Responsible Family Medicine [phone]

## 2021-12-03 ENCOUNTER — DOCUMENTATION ONLY (OUTPATIENT)
Dept: ANTICOAGULATION | Facility: CLINIC | Age: 61
End: 2021-12-03
Payer: COMMERCIAL

## 2021-12-03 DIAGNOSIS — D68.62 LUPUS ANTICOAGULANT DISORDER (H): ICD-10-CM

## 2021-12-03 DIAGNOSIS — D73.5 SPLENIC INFARCT: Primary | ICD-10-CM

## 2021-12-03 NOTE — PROGRESS NOTES
ANTICOAGULATION MANAGEMENT      Silvia Martinez due for annual renewal of referral to anticoagulation monitoring. Order pended for your review and signature.      ANTICOAGULATION SUMMARY      Warfarin indication(s)     splenic infarct    Heart valve present?  NO       Current goal range   INR: 2.0-3.0     Goal appropriate for indication? Request provider review of goal range due to non standard diagnosis     Current duration of therapy Indefinite/long term therapy   Time in Therapeutic Range (TTR)  (Goal > 60%) 89%       Office visit with referring provider's group within last year yes on 12/24/2021       Gabrielle Britton RN

## 2021-12-14 ENCOUNTER — LAB (OUTPATIENT)
Dept: LAB | Facility: CLINIC | Age: 61
End: 2021-12-14
Payer: COMMERCIAL

## 2021-12-14 ENCOUNTER — ANTICOAGULATION THERAPY VISIT (OUTPATIENT)
Dept: ANTICOAGULATION | Facility: CLINIC | Age: 61
End: 2021-12-14

## 2021-12-14 DIAGNOSIS — D73.5 SPLENIC INFARCT: ICD-10-CM

## 2021-12-14 DIAGNOSIS — D68.62 LUPUS ANTICOAGULANT DISORDER (H): ICD-10-CM

## 2021-12-14 DIAGNOSIS — D73.5 SPLENIC INFARCT: Primary | ICD-10-CM

## 2021-12-14 LAB — INR BLD: 3.1 (ref 0.9–1.1)

## 2021-12-14 PROCEDURE — 85610 PROTHROMBIN TIME: CPT

## 2021-12-14 PROCEDURE — 36416 COLLJ CAPILLARY BLOOD SPEC: CPT

## 2021-12-14 NOTE — PROGRESS NOTES
ANTICOAGULATION MANAGEMENT     Silvia Martinez 61 year old female is on warfarin with supratherapeutic INR result. (Goal INR 2.0-3.0)    Recent labs: (last 7 days)     12/14/21  1427   INR 3.1*       ASSESSMENT     Source(s): Chart Review and Template       Warfarin doses taken: Warfarin taken as instructed    Diet: No new diet changes identified    New illness, injury, or hospitalization: Recovering from bronchitis    Medication/supplement changes: Completed zpack and 5 days course of prednisone on 12/2 may still affecting INR    Signs or symptoms of bleeding or clotting: No    Previous INR: Supratherapeutic    Additional findings: Going out of town tomorrow and coming back Sun- ( flying)     PLAN     Recommended plan for temporary change(s) affecting INR     Dosing Instructions: Continue your current warfarin dose with next INR in 2 weeks   ~ patient will eat extra green today to help bring INR down.    Summary  As of 12/14/2021    Full warfarin instructions:  7.5 mg every Sun, Wed; 5 mg all other days   Next INR check:  12/28/2021             Telephone call with Silvia who verbalizes understanding and agrees to plan    Patient elected to schedule next visit 12/31/2021    Education provided: Impact of vitamin K foods on INR, Importance of therapeutic range, Importance of following up at instructed interval and Importance of taking warfarin as instructed    Plan made per [facility]agulation protocol    Gabrielle Britton RN  Anticoagulation Clinic  12/14/2021    _______________________________________________________________________     Anticoagulation Episode Summary     Current INR goal:  2.0-3.0   TTR:  85.3 % (1 y)   Target end date:  Indefinite   Send INR reminders to:  [facility]    Indications    Splenic infarct [D73.5]           Comments:           Anticoagulation Care Providers     Provider Role Specialty Phone number    Jose Ramon Bolton MD Referring Family Medicine [phone]    Rosa Maria Alcaraz MD  Referring Family Medicine [phone]    Chris Pan MD Responsible Family Medicine [phone]

## 2021-12-20 ENCOUNTER — TELEPHONE (OUTPATIENT)
Dept: ADMINISTRATIVE | Facility: CLINIC | Age: 61
End: 2021-12-20
Payer: COMMERCIAL

## 2021-12-20 DIAGNOSIS — R30.0 DYSURIA: Primary | ICD-10-CM

## 2021-12-20 NOTE — TELEPHONE ENCOUNTER
Would she just rather me send in an antibiotic?  I'm happy to do that without a urine.    Let me know    TS

## 2021-12-20 NOTE — TELEPHONE ENCOUNTER
Reason for Call: Request for an order or referral:    Order or referral being requested: Patient is requesting orders for UA/UC.  Patient thinks she has UTI    Date needed: as soon as possible    Has the patient been seen by the PCP for this problem? NO    Phone number Patient can be reached at:  Cell number on file:    Telephone Information:   Mobile [phone]       Best Time:  anytime    Can we leave a detailed message on this number?  YES    Call taken on 12/20/2021 at 3:30 PM by Danika Banks

## 2021-12-21 ENCOUNTER — LAB (OUTPATIENT)
Dept: LAB | Facility: CLINIC | Age: 61
End: 2021-12-21
Payer: COMMERCIAL

## 2021-12-21 DIAGNOSIS — R30.0 DYSURIA: ICD-10-CM

## 2021-12-21 LAB
ALBUMIN UR-MCNC: NEGATIVE MG/DL
APPEARANCE UR: CLEAR
BILIRUB UR QL STRIP: NEGATIVE
COLOR UR AUTO: YELLOW
GLUCOSE UR STRIP-MCNC: NEGATIVE MG/DL
HGB UR QL STRIP: ABNORMAL
KETONES UR STRIP-MCNC: NEGATIVE MG/DL
LEUKOCYTE ESTERASE UR QL STRIP: NEGATIVE
NITRATE UR QL: NEGATIVE
PH UR STRIP: 5.5 [PH] (ref 5–8)
RBC #/AREA URNS AUTO: NORMAL /HPF
SP GR UR STRIP: 1.02 (ref 1–1.03)
UROBILINOGEN UR STRIP-ACNC: 0.2 E.U./DL
WBC #/AREA URNS AUTO: NORMAL /HPF

## 2021-12-21 PROCEDURE — 81001 URINALYSIS AUTO W/SCOPE: CPT

## 2021-12-21 NOTE — TELEPHONE ENCOUNTER
Spoke with patient and she would prefer to leave a urine. Scheduled a lab only appointment for this morning.    Johanny FORBES CMA ([facility])

## 2021-12-31 ENCOUNTER — LAB (OUTPATIENT)
Dept: LAB | Facility: CLINIC | Age: 61
End: 2021-12-31
Payer: COMMERCIAL

## 2021-12-31 ENCOUNTER — ANTICOAGULATION THERAPY VISIT (OUTPATIENT)
Dept: ANTICOAGULATION | Facility: CLINIC | Age: 61
End: 2021-12-31

## 2021-12-31 DIAGNOSIS — D73.5 SPLENIC INFARCT: ICD-10-CM

## 2021-12-31 DIAGNOSIS — D73.5 SPLENIC INFARCT: Primary | ICD-10-CM

## 2021-12-31 DIAGNOSIS — D68.62 LUPUS ANTICOAGULANT DISORDER (H): ICD-10-CM

## 2021-12-31 LAB — INR BLD: 3 (ref 0.9–1.1)

## 2021-12-31 PROCEDURE — 85610 PROTHROMBIN TIME: CPT

## 2021-12-31 PROCEDURE — 36415 COLL VENOUS BLD VENIPUNCTURE: CPT

## 2021-12-31 NOTE — PROGRESS NOTES
ANTICOAGULATION MANAGEMENT     Silvia Ptaelmichelle 61 year old female is on warfarin with therapeutic INR result. (Goal INR 2.0-3.0)    Recent labs: (last 7 days)     12/31/21  0825   INR 3.0*       ASSESSMENT     Source(s): Chart Review and Template       Warfarin doses taken: Warfarin taken as instructed    Diet: No new diet changes identified    New illness, injury, or hospitalization: No    Medication/supplement changes: None noted    Signs or symptoms of bleeding or clotting: No    Previous INR: Supratherapeutic    Additional findings: None     PLAN     Recommended plan for no diet, medication or health factor changes affecting INR     Dosing Instructions: Continue your current warfarin dose with next INR in 2 weeks       Summary  As of 12/31/2021    Full warfarin instructions:  7.5 mg every Sun, Wed; 5 mg all other days   Next INR check:  1/14/2022             Telephone call with Silvia who verbalizes understanding and agrees to plan    Check at provider office visit on 1/14/2022    Education provided: Importance of consistent vitamin K intake, Impact of vitamin K foods on INR, Vitamin K content of foods and Goal range and significance of current result    Plan made per [facility] anticoagulation protocol    Gabrielle Britton RN  Anticoagulation Clinic  12/31/2021    _______________________________________________________________________     Anticoagulation Episode Summary     Current INR goal:  2.0-3.0   TTR:  80.7 % (1 y)   Target end date:  Indefinite   Send INR reminders to:  [facility]    Indications    Splenic infarct [D73.5]           Comments:           Anticoagulation Care Providers     Provider Role Specialty Phone number    Jose Ramon Bolton MD Referring Family Medicine [phone]    Rosa Maria Alcaraz MD Referring Family Medicine [phone]    Chris Pan MD Responsible Family Medicine [phone]

## 2022-01-14 ENCOUNTER — OFFICE VISIT (OUTPATIENT)
Dept: FAMILY MEDICINE | Facility: CLINIC | Age: 62
End: 2022-01-14
Payer: COMMERCIAL

## 2022-01-14 ENCOUNTER — ANTICOAGULATION THERAPY VISIT (OUTPATIENT)
Dept: ANTICOAGULATION | Facility: CLINIC | Age: 62
End: 2022-01-14

## 2022-01-14 VITALS
DIASTOLIC BLOOD PRESSURE: 73 MMHG | WEIGHT: 202.1 LBS | HEIGHT: 66 IN | OXYGEN SATURATION: 96 % | HEART RATE: 80 BPM | SYSTOLIC BLOOD PRESSURE: 135 MMHG | BODY MASS INDEX: 32.48 KG/M2

## 2022-01-14 DIAGNOSIS — G89.29 CHRONIC BILATERAL LOW BACK PAIN WITHOUT SCIATICA: Primary | ICD-10-CM

## 2022-01-14 DIAGNOSIS — D73.5 SPLENIC INFARCT: Primary | ICD-10-CM

## 2022-01-14 DIAGNOSIS — D68.62 LUPUS ANTICOAGULANT DISORDER (H): ICD-10-CM

## 2022-01-14 DIAGNOSIS — M54.50 CHRONIC BILATERAL LOW BACK PAIN WITHOUT SCIATICA: Primary | ICD-10-CM

## 2022-01-14 DIAGNOSIS — D73.5 SPLENIC INFARCT: ICD-10-CM

## 2022-01-14 DIAGNOSIS — E03.9 ACQUIRED HYPOTHYROIDISM: ICD-10-CM

## 2022-01-14 LAB — INR BLD: 2.5 (ref 0.9–1.1)

## 2022-01-14 PROCEDURE — 99214 OFFICE O/P EST MOD 30 MIN: CPT | Performed by: FAMILY MEDICINE

## 2022-01-14 PROCEDURE — 85610 PROTHROMBIN TIME: CPT | Performed by: FAMILY MEDICINE

## 2022-01-14 PROCEDURE — 36416 COLLJ CAPILLARY BLOOD SPEC: CPT | Performed by: FAMILY MEDICINE

## 2022-01-14 RX ORDER — LEVOTHYROXINE SODIUM 100 UG/1
100 TABLET ORAL DAILY
Qty: 90 TABLET | Refills: 3 | Status: SHIPPED | OUTPATIENT
Start: 2022-01-14 | End: 2023-01-03

## 2022-01-14 ASSESSMENT — MIFFLIN-ST. JEOR: SCORE: 1493.47

## 2022-01-14 NOTE — PROGRESS NOTES
ANTICOAGULATION MANAGEMENT     Silvia Martinez 61 year old female is on warfarin with therapeutic INR result. (Goal INR 2.0-3.0)    Recent labs: (last 7 days)     01/14/22  1242   INR 2.5*       ASSESSMENT     Source(s): Chart Review, Patient/Caregiver Call and Template       Warfarin doses taken: Warfarin taken as instructed    Diet: No new diet changes identified    New illness, injury, or hospitalization: No    Medication/supplement changes: None noted    Signs or symptoms of bleeding or clotting: No    Previous INR: Therapeutic last visit; previously outside of goal range    Additional findings: None     PLAN     Recommended plan for no diet, medication or health factor changes affecting INR     Dosing Instructions: Continue your current warfarin dose with next INR in 4 weeks       Summary  As of 1/14/2022    Full warfarin instructions:  7.5 mg every Sun, Wed; 5 mg all other days   Next INR check:  2/11/2022             Telephone call with Silvia who verbalizes understanding and agrees to plan    Lab visit scheduled    Education provided: Importance of therapeutic range, Importance of following up at instructed interval and Contact [phone] with any changes, questions or concerns.     Plan made per [facility] anticoagulation protocol    Gabrielle Britton RN  Anticoagulation Clinic  1/14/2022    _______________________________________________________________________     Anticoagulation Episode Summary     Current INR goal:  2.0-3.0   TTR:  80.9 % (1 y)   Target end date:  Indefinite   Send INR reminders to:  [facility]    Indications    Splenic infarct [D73.5]           Comments:           Anticoagulation Care Providers     Provider Role Specialty Phone number    Jose Ramon Bolton MD Referring Family Medicine [phone]    Rosa Maria Alcaraz MD Referring Family Medicine [phone]    Chris Pan MD Responsible Family Medicine [phone]

## 2022-01-17 NOTE — PROGRESS NOTES
"  Assessment & Plan     Chronic bilateral low back pain without sciatica    She would benefit from a physical therapy referral so I did send her there so that they can help her with her back, and hopefully suggest some stretching exercises that she can do at home that would benefit her.  She can continue with some pain medications but she is sort of stuck on Tylenol because she is also on warfarin for the items listed below.  I suggested some steady heat as well whenever she can do that.      - Physical Therapy Referral; Future    Acquired hypothyroidism    I refilled her levothyroxine for her at 0.1 mg a day as has been pretty stable for her for some time.      - levothyroxine (SYNTHROID/LEVOTHROID) 100 MCG tablet; Take 1 tablet (100 mcg) by mouth daily    Splenic infarct    - INR point of care    Lupus anticoagulant disorder (H)    - INR point of care    Review of external notes as documented elsewhere in note  Review of the result(s) of each unique test - labs  Ordering of each unique test  Prescription drug management         BMI:   Estimated body mass index is 32.62 kg/m  as calculated from the following:    Height as of this encounter: 1.676 m (5' 6\").    Weight as of this encounter: 91.7 kg (202 lb 1.6 oz).           No follow-ups on file.    Jose Ramon Bolton MD  [facility] LUBNA Vega is a 61 year old who presents for the following health issues     HPI     Patient is here today for follow-up of her back.  She has had some ongoing issues of low back pain over some time, but has been getting a little bit worse recently.  She has pain in the lumbar area bilaterally.  It does not really go down into the buttock or down into the legs.  It seems to go across the low back for the most part.  It is on both sides but today seems to be a little bit more on the left.  No numbness or tingling down the legs at all.  No weakness that she appreciates down into the legs.    She also like " "a refill of her thyroid medication as she has been on for some time.  The dose has been stable for quite a while.    She also needs an INR today because she is on Coumadin.      Review of Systems   Constitutional, HEENT, cardiovascular, pulmonary, GI, , musculoskeletal, neuro, skin, endocrine and psych systems are negative, except as otherwise noted.      Objective    /73 (BP Location: Left arm, Patient Position: Sitting, Cuff Size: Adult Regular)   Pulse 80   Ht 1.676 m (5' 6\")   Wt 91.7 kg (202 lb 1.6 oz)   SpO2 96%   BMI 32.62 kg/m    Body mass index is 32.62 kg/m .  Physical Exam   GENERAL: healthy, alert and no distress  NECK: no adenopathy, no asymmetry, masses, or scars and thyroid normal to palpation  RESP: lungs clear to auscultation - no rales, rhonchi or wheezes  CV: regular rate and rhythm, normal S1 S2, no S3 or S4, no murmur, click or rub, no peripheral edema and peripheral pulses strong  ABDOMEN: soft, nontender, no hepatosplenomegaly, no masses and bowel sounds normal  Low back shows excellent range of motion to flexion and extension, but she is a little limited to lateral flexion on the left.  Rotation seems to be pretty normal.  No sciatic notch tenderness and no trochanteric tenderness.  Negative straight leg raise.  Good strength sensation and reflexes into the lower extremities.                "

## 2022-02-07 ENCOUNTER — HOSPITAL ENCOUNTER (OUTPATIENT)
Dept: PHYSICAL THERAPY | Facility: REHABILITATION | Age: 62
End: 2022-02-07
Attending: FAMILY MEDICINE
Payer: COMMERCIAL

## 2022-02-07 DIAGNOSIS — G89.29 CHRONIC BILATERAL LOW BACK PAIN WITHOUT SCIATICA: ICD-10-CM

## 2022-02-07 DIAGNOSIS — M54.50 CHRONIC BILATERAL LOW BACK PAIN WITHOUT SCIATICA: ICD-10-CM

## 2022-02-07 PROCEDURE — 97110 THERAPEUTIC EXERCISES: CPT | Mod: GP | Performed by: PHYSICAL THERAPIST

## 2022-02-07 PROCEDURE — 97161 PT EVAL LOW COMPLEX 20 MIN: CPT | Mod: GP | Performed by: PHYSICAL THERAPIST

## 2022-02-07 PROCEDURE — 97140 MANUAL THERAPY 1/> REGIONS: CPT | Mod: GP | Performed by: PHYSICAL THERAPIST

## 2022-02-11 ENCOUNTER — ANTICOAGULATION THERAPY VISIT (OUTPATIENT)
Dept: ANTICOAGULATION | Facility: CLINIC | Age: 62
End: 2022-02-11

## 2022-02-11 ENCOUNTER — LAB (OUTPATIENT)
Dept: LAB | Facility: CLINIC | Age: 62
End: 2022-02-11
Payer: COMMERCIAL

## 2022-02-11 DIAGNOSIS — D73.5 SPLENIC INFARCT: Primary | ICD-10-CM

## 2022-02-11 DIAGNOSIS — D68.62 LUPUS ANTICOAGULANT DISORDER (H): ICD-10-CM

## 2022-02-11 DIAGNOSIS — D73.5 SPLENIC INFARCT: ICD-10-CM

## 2022-02-11 LAB — INR BLD: 2.3 (ref 0.9–1.1)

## 2022-02-11 PROCEDURE — 2894A VOIDCORRECT: CPT

## 2022-02-11 PROCEDURE — 85610 PROTHROMBIN TIME: CPT

## 2022-02-11 NOTE — PROGRESS NOTES
ANTICOAGULATION MANAGEMENT     Silvia Martinez 61 year old female is on warfarin with therapeutic INR result. (Goal INR 2.0-3.0)    Recent labs: (last 7 days)     02/11/22  0914   INR 2.3*       ASSESSMENT     Source(s): Chart Review and Template       Warfarin doses taken: Warfarin taken as instructed    Diet: No new diet changes identified    New illness, injury, or hospitalization: Yes: cold symptoms last week~ negative covid  with home testing kit    Medication/supplement changes: Tylenol and decongestant for 6 days last week may be affecting INR    Signs or symptoms of bleeding or clotting: No    Previous INR: Therapeutic last 2(+) visits    Additional findings: None     PLAN     Recommended plan for temporary change(s) affecting INR     Dosing Instructions: Continue your current warfarin dose with next INR in 4 weeks       Summary  As of 2/11/2022    Full warfarin instructions:  7.5 mg every Sun, Wed; 5 mg all other days   Next INR check:  3/11/2022             Telephone call with Silvia who verbalizes understanding and agrees to plan    Patient elected to schedule next visit 3/18    Education provided: Importance of therapeutic range and Contact [phone] with any changes, questions or concerns.     Plan made per [facility] anticoagulation protocol    Gabrielle Britton RN  Anticoagulation Clinic  2/11/2022    _______________________________________________________________________     Anticoagulation Episode Summary     Current INR goal:  2.0-3.0   TTR:  82.6 % (1 y)   Target end date:  Indefinite   Send INR reminders to:  [facility]    Indications    Splenic infarct [D73.5]           Comments:           Anticoagulation Care Providers     Provider Role Specialty Phone number    Jose Ramon Bolton MD Referring Family Medicine [phone]    Rosa Maria Alcarza MD Referring Family Medicine [phone]    Chris Pan MD Responsible Family Medicine [phone]

## 2022-02-18 ENCOUNTER — HOSPITAL ENCOUNTER (OUTPATIENT)
Dept: PHYSICAL THERAPY | Facility: REHABILITATION | Age: 62
End: 2022-02-18
Payer: COMMERCIAL

## 2022-02-18 DIAGNOSIS — M54.50 CHRONIC BILATERAL LOW BACK PAIN WITHOUT SCIATICA: Primary | ICD-10-CM

## 2022-02-18 DIAGNOSIS — G89.29 CHRONIC BILATERAL LOW BACK PAIN WITHOUT SCIATICA: Primary | ICD-10-CM

## 2022-02-18 PROCEDURE — 97140 MANUAL THERAPY 1/> REGIONS: CPT | Mod: GP

## 2022-02-18 PROCEDURE — 97110 THERAPEUTIC EXERCISES: CPT | Mod: GP

## 2022-02-25 ENCOUNTER — MYC MEDICAL ADVICE (OUTPATIENT)
Dept: FAMILY MEDICINE | Facility: CLINIC | Age: 62
End: 2022-02-25
Payer: COMMERCIAL

## 2022-02-25 DIAGNOSIS — E03.9 ACQUIRED HYPOTHYROIDISM: Primary | ICD-10-CM

## 2022-02-28 ENCOUNTER — HOSPITAL ENCOUNTER (OUTPATIENT)
Dept: PHYSICAL THERAPY | Facility: REHABILITATION | Age: 62
End: 2022-02-28
Payer: COMMERCIAL

## 2022-02-28 DIAGNOSIS — G89.29 CHRONIC BILATERAL LOW BACK PAIN WITHOUT SCIATICA: Primary | ICD-10-CM

## 2022-02-28 DIAGNOSIS — M54.50 CHRONIC BILATERAL LOW BACK PAIN WITHOUT SCIATICA: Primary | ICD-10-CM

## 2022-02-28 PROCEDURE — 97140 MANUAL THERAPY 1/> REGIONS: CPT | Mod: GP | Performed by: PHYSICAL THERAPIST

## 2022-03-09 ENCOUNTER — HOSPITAL ENCOUNTER (OUTPATIENT)
Dept: PHYSICAL THERAPY | Facility: REHABILITATION | Age: 62
End: 2022-03-09
Payer: COMMERCIAL

## 2022-03-09 DIAGNOSIS — M54.50 CHRONIC BILATERAL LOW BACK PAIN WITHOUT SCIATICA: Primary | ICD-10-CM

## 2022-03-09 DIAGNOSIS — G89.29 CHRONIC BILATERAL LOW BACK PAIN WITHOUT SCIATICA: Primary | ICD-10-CM

## 2022-03-09 PROCEDURE — 97140 MANUAL THERAPY 1/> REGIONS: CPT | Mod: GP | Performed by: PHYSICAL THERAPIST

## 2022-03-18 ENCOUNTER — ANTICOAGULATION THERAPY VISIT (OUTPATIENT)
Dept: ANTICOAGULATION | Facility: CLINIC | Age: 62
End: 2022-03-18

## 2022-03-18 ENCOUNTER — LAB (OUTPATIENT)
Dept: LAB | Facility: CLINIC | Age: 62
End: 2022-03-18
Payer: COMMERCIAL

## 2022-03-18 DIAGNOSIS — D73.5 SPLENIC INFARCT: ICD-10-CM

## 2022-03-18 DIAGNOSIS — D73.5 SPLENIC INFARCT: Primary | ICD-10-CM

## 2022-03-18 DIAGNOSIS — E03.9 ACQUIRED HYPOTHYROIDISM: ICD-10-CM

## 2022-03-18 DIAGNOSIS — D68.62 LUPUS ANTICOAGULANT DISORDER (H): ICD-10-CM

## 2022-03-18 LAB
INR BLD: 3 (ref 0.9–1.1)
TSH SERPL DL<=0.005 MIU/L-ACNC: 1.57 UIU/ML (ref 0.3–5)

## 2022-03-18 PROCEDURE — 84443 ASSAY THYROID STIM HORMONE: CPT

## 2022-03-18 PROCEDURE — 36415 COLL VENOUS BLD VENIPUNCTURE: CPT

## 2022-03-18 PROCEDURE — 36416 COLLJ CAPILLARY BLOOD SPEC: CPT

## 2022-03-18 PROCEDURE — 85610 PROTHROMBIN TIME: CPT

## 2022-03-18 NOTE — PROGRESS NOTES
ANTICOAGULATION MANAGEMENT     Silvia Martinez 61 year old female is on warfarin with therapeutic INR result. (Goal INR 2.0-3.0)    Recent labs: (last 7 days)     03/18/22  0832   INR 3.0*       ASSESSMENT       Source(s): Chart Review and Template       Warfarin doses taken: Warfarin taken as instructed    Diet: No new diet changes identified    New illness, injury, or hospitalization: No    Medication/supplement changes: Used some tylenol and dayquil last week, likely cause of INR being on higher end today    Signs or symptoms of bleeding or clotting: No    Previous INR: Therapeutic last 2(+) visits    Additional findings: None       PLAN     Recommended plan for temporary change(s) affecting INR     Dosing Instructions: Continue your current warfarin dose with next INR in 4 weeks       Summary  As of 3/18/2022    Full warfarin instructions:  7.5 mg every Sun, Wed; 5 mg all other days   Next INR check:  4/15/2022             Detailed voice message left for Silvia with dosing instructions and follow up date.     Lab visit scheduled - pt already made lab appt for 4/15    Education provided: Goal range and significance of current result, Importance of following up at instructed interval and Contact [phone] with any changes, questions or concerns.     Plan made per [facility] anticoagulation protocol    Lavern Porras RN  Anticoagulation Clinic  3/18/2022    _______________________________________________________________________     Anticoagulation Episode Summary     Current INR goal:  2.0-3.0   TTR:  82.6 % (1 y)   Target end date:  Indefinite   Send INR reminders to:  [facility]    Indications    Splenic infarct [D73.5]           Comments:           Anticoagulation Care Providers     Provider Role Specialty Phone number    Jose Ramon Bolton MD Referring Family Medicine [phone]    Rosa Maria Alcaraz MD Referring Family Medicine [phone]    Chris Pan MD Responsible Family Medicine [phone]

## 2022-03-23 ENCOUNTER — HOSPITAL ENCOUNTER (OUTPATIENT)
Dept: PHYSICAL THERAPY | Facility: REHABILITATION | Age: 62
Discharge: HOME | End: 2022-03-23
Payer: COMMERCIAL

## 2022-03-23 DIAGNOSIS — M54.50 CHRONIC BILATERAL LOW BACK PAIN WITHOUT SCIATICA: Primary | ICD-10-CM

## 2022-03-23 DIAGNOSIS — G89.29 CHRONIC BILATERAL LOW BACK PAIN WITHOUT SCIATICA: Primary | ICD-10-CM

## 2022-03-23 PROCEDURE — 97110 THERAPEUTIC EXERCISES: CPT | Mod: GP | Performed by: PHYSICAL THERAPIST

## 2022-03-23 PROCEDURE — 97140 MANUAL THERAPY 1/> REGIONS: CPT | Mod: GP | Performed by: PHYSICAL THERAPIST

## 2022-03-29 ENCOUNTER — HOSPITAL ENCOUNTER (OUTPATIENT)
Dept: PHYSICAL THERAPY | Facility: REHABILITATION | Age: 62
Discharge: HOME | End: 2022-03-29
Payer: COMMERCIAL

## 2022-03-29 DIAGNOSIS — G89.29 CHRONIC BILATERAL LOW BACK PAIN WITHOUT SCIATICA: Primary | ICD-10-CM

## 2022-03-29 DIAGNOSIS — M54.50 CHRONIC BILATERAL LOW BACK PAIN WITHOUT SCIATICA: Primary | ICD-10-CM

## 2022-03-29 PROCEDURE — 97140 MANUAL THERAPY 1/> REGIONS: CPT | Mod: GP | Performed by: PHYSICAL THERAPIST

## 2022-03-29 PROCEDURE — 97110 THERAPEUTIC EXERCISES: CPT | Mod: GP | Performed by: PHYSICAL THERAPIST

## 2022-04-15 ENCOUNTER — ANTICOAGULATION THERAPY VISIT (OUTPATIENT)
Dept: ANTICOAGULATION | Facility: CLINIC | Age: 62
End: 2022-04-15

## 2022-04-15 ENCOUNTER — LAB (OUTPATIENT)
Dept: LAB | Facility: CLINIC | Age: 62
End: 2022-04-15
Payer: COMMERCIAL

## 2022-04-15 DIAGNOSIS — D73.5 SPLENIC INFARCT: ICD-10-CM

## 2022-04-15 DIAGNOSIS — D68.62 LUPUS ANTICOAGULANT DISORDER (H): ICD-10-CM

## 2022-04-15 DIAGNOSIS — D73.5 SPLENIC INFARCT: Primary | ICD-10-CM

## 2022-04-15 LAB — INR BLD: 3.1 (ref 0.9–1.1)

## 2022-04-15 PROCEDURE — 85610 PROTHROMBIN TIME: CPT

## 2022-04-15 PROCEDURE — 36416 COLLJ CAPILLARY BLOOD SPEC: CPT

## 2022-04-15 NOTE — PROGRESS NOTES
ANTICOAGULATION MANAGEMENT     Silvia Patelmichelle 61 year old female is on warfarin with supratherapeutic INR result. (Goal INR 2.0-3.0)    Recent labs: (last 7 days)     04/15/22  0751   INR 3.1*       ASSESSMENT       Source(s): Chart Review and Template       Warfarin doses taken: Warfarin taken as instructed    Diet: No new diet changes identified - per patient her appetite has not been affected.    New illness, injury, or hospitalization: Yes: sinus infection    Medication/supplement changes: zithromax 5 days course last dose taken on 4/11 which has potential for interaction; increasing INR    Tylenol, Nyquil as needed use which has potential for interaction; increasing INR    Sudafed as needed use No interaction anticipated         Signs or symptoms of bleeding or clotting: No    Previous INR: Therapeutic last 2(+) visits    Additional findings: Per patient she is is feeling much better now.       PLAN     Recommended plan for temporary change(s) affecting INR     Dosing Instructions: continue your current warfarin dose with next INR in 2 weeks   - patient prefers to eat extra greens to help INR down versus taking partial dose.    Summary  As of 4/15/2022    Full warfarin instructions:  7.5 mg every Sun, Wed; 5 mg all other days   Next INR check:  4/29/2022             Telephone call with Silvia who verbalizes understanding and agrees to plan    Patient elected to schedule next visit 5/4 due to she will be out of town but is aware to come in sooner if signs and symptoms of bleeding is noted    Education provided: Importance of therapeutic range, Importance of following up at instructed interval, Potential interaction between warfarin and zithromax, tylenol,nyquil and Monitoring for bleeding signs and symptoms    Plan made per [facility] anticoagulation protocol    Gabrielle Britton RN  Anticoagulation Clinic  4/15/2022    _______________________________________________________________________     Anticoagulation Episode Summary      Current INR goal:  2.0-3.0   TTR:  75.0 % (1 y)   Target end date:  Indefinite   Send INR reminders to:  [facility]    Indications    Splenic infarct [D73.5]           Comments:           Anticoagulation Care Providers     Provider Role Specialty Phone number    Jose Ramon Bolton MD Referring [facility] Medicine [phone]    Rosa Maria Alcaraz MD Referring [facility] [phone]    Chris Pan MD Responsible [facility] [phone]

## 2022-04-18 ENCOUNTER — OFFICE VISIT (OUTPATIENT)
Dept: FAMILY MEDICINE | Facility: CLINIC | Age: 62
End: 2022-04-18
Payer: COMMERCIAL

## 2022-04-18 ENCOUNTER — TELEPHONE (OUTPATIENT)
Dept: FAMILY MEDICINE | Facility: CLINIC | Age: 62
End: 2022-04-18

## 2022-04-18 VITALS
HEART RATE: 71 BPM | SYSTOLIC BLOOD PRESSURE: 108 MMHG | OXYGEN SATURATION: 97 % | BODY MASS INDEX: 32.05 KG/M2 | DIASTOLIC BLOOD PRESSURE: 64 MMHG | WEIGHT: 198.6 LBS

## 2022-04-18 DIAGNOSIS — J01.30 ACUTE NON-RECURRENT SPHENOIDAL SINUSITIS: Primary | ICD-10-CM

## 2022-04-18 PROCEDURE — 99214 OFFICE O/P EST MOD 30 MIN: CPT | Performed by: FAMILY MEDICINE

## 2022-04-18 RX ORDER — AMOXICILLIN AND CLAVULANATE POTASSIUM 500; 125 MG/1; MG/1
1 TABLET, FILM COATED ORAL 2 TIMES DAILY
Qty: 20 TABLET | Refills: 1 | Status: SHIPPED | OUTPATIENT
Start: 2022-04-18 | End: 2022-04-28

## 2022-04-18 NOTE — PROGRESS NOTES
Answers for HPI/ROS submitted by the patient on 4/18/2022  How many servings of fruits and vegetables do you eat daily?: 2-3  On average, how many sweetened beverages do you drink each day (Examples: soda, juice, sweet tea, etc.  Do NOT count diet or artificially sweetened beverages)?: 0  How many minutes a day do you exercise enough to make your heart beat faster?: 9 or less  How many days a week do you exercise enough to make your heart beat faster?: 3 or less  How many days per week do you miss taking your medication?: 0  What is the reason for your visit today?: Sinus pain. Earache. Headache. Cough.  When did your symptoms begin?: 3-4 weeks ago  What are your symptoms?: As above  How would you describe these symptoms?: Moderate  Are your symptoms:: Worsening  Have you had these symptoms before?: Yes  Have you tried or received treatment for these symptoms before?: Yes  Did that treatment work? : Yes  Please describe the treatment you had:: Zithromax. Finished 4-10-22.  Is there anything that makes you feel worse?: No  Is there anything that makes you feel better?: Some OTC meds.    Chief complaint: Sinusitis    HPI: See message above.  She had been in [place] about a week ago and was given a Z-Rk and finished that on 10 April.  She is not able to use a steroid nasal spray because it is not really very effective.  For her maintenance of her sinus infection she would try nasal saline, the real pseudoephedrine and Coricidin.  She does not think that she has used prednisone or Medrol Dosepak in the past.  She is tired a little bit fatigued no fever and has had 3 negative COVID tests in the last 3 weeks because they were traveling and her most recent negative COVID test was 2 days ago.    She has sinus pain and ear pain little bit of a cough that is mostly Clear but in a couple days ago started to have a little bit of brown mucus.  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    Objective:/64   Pulse 71   Wt 90.1 kg (198 lb 9.6 oz)   SpO2 97%   BMI 32.05 kg/m    She is in no distress.  She is wearing glasses.  Her conjunctiva are clear and TMs are little congested.  Nasal mucosa are slightly boggy she has a lot of facial tenderness over the maxillary sinus on the right and a little bit behind the eyes more so on the right than on the left.  She reports some posterior pharyngeal drainage which I am not able to see today.  Her neck is supple and her lungs are clear    Assessment: Sinusitis partially treated    Plan: I Yolanda try Augmentin twice a day for 10 days and since this has been going on for so long I will provide a refill if she needs that and she was warned that she may not have to take the entire course of the refill if she ends up getting that filled.  At this point we had shared decision making to not do a Medrol Dosepak since I am being so aggressive with the antibiotic and she will continue her Sudafed and nasal saline and was comfortable with this plan.

## 2022-04-18 NOTE — TELEPHONE ENCOUNTER
Reason for Call:  Other prescription    Detailed comments: Pt is calling to say she is going to be taking Augmentin and wants to know if that will affect her INR. Pls advise.  Pt stated to leave a detailed msg if she does not answer.    Phone Number Patient can be reached at: Home number on file [phone] (home)    Best Time: any    Can we leave a detailed message on this number? YES    Call taken on 4/18/2022 at 10:44 AM by Donna Khalil

## 2022-04-18 NOTE — TELEPHONE ENCOUNTER
ACN called and left a detailed message for Silvia and updated of the potential interaction between Augmentin and warfarin and instructed to have INR check on 4/21/22    Instructed to call  to schedule appt or if she has other questions or concerns.    Gabrielle Britton RN [facility]    [phone]

## 2022-04-22 ENCOUNTER — ANTICOAGULATION THERAPY VISIT (OUTPATIENT)
Dept: ANTICOAGULATION | Facility: CLINIC | Age: 62
End: 2022-04-22

## 2022-04-22 ENCOUNTER — LAB (OUTPATIENT)
Dept: LAB | Facility: CLINIC | Age: 62
End: 2022-04-22
Payer: COMMERCIAL

## 2022-04-22 DIAGNOSIS — D73.5 SPLENIC INFARCT: ICD-10-CM

## 2022-04-22 DIAGNOSIS — D68.62 LUPUS ANTICOAGULANT DISORDER (H): ICD-10-CM

## 2022-04-22 DIAGNOSIS — D73.5 SPLENIC INFARCT: Primary | ICD-10-CM

## 2022-04-22 LAB — INR BLD: 2.9 (ref 0.9–1.1)

## 2022-04-22 PROCEDURE — 85610 PROTHROMBIN TIME: CPT

## 2022-04-22 PROCEDURE — 36416 COLLJ CAPILLARY BLOOD SPEC: CPT

## 2022-04-22 NOTE — PROGRESS NOTES
ANTICOAGULATION MANAGEMENT     Silvia Martinez 61 year old female is on warfarin with therapeutic INR result. (Goal INR 2.0-3.0)    Recent labs: (last 7 days)     04/22/22  0730   INR 2.9*       ASSESSMENT       Source(s): Chart Review, Patient/Caregiver Call and Template       Warfarin doses taken: Warfarin taken as instructed    Diet: No new diet changes identified    New illness, injury, or hospitalization: Yes: sinus infection diagnosed 4/18    Medication/supplement changes: augmentin 10 day course (dates: 4/18-4/27) which has potential for interaction; increasing INR    Signs or symptoms of bleeding or clotting: No    Previous INR: Supratherapeutic    Additional findings: None       PLAN     Recommended plan for temporary change(s) affecting INR     Dosing Instructions: continue your current warfarin dose with next INR in 2 weeks       Summary  As of 4/22/2022    Full warfarin instructions:  7.5 mg every Sun, Wed; 5 mg all other days   Next INR check:  5/4/2022             Telephone call with Silvia who verbalizes understanding and agrees to plan    Lab visit scheduled    Education provided: Goal range and significance of current result and Contact [phone] with any changes, questions or concerns.     Plan made per [facility] anticoagulation protocol    Kathy Chapin RN  Anticoagulation Clinic  4/22/2022    _______________________________________________________________________     Anticoagulation Episode Summary     Current INR goal:  2.0-3.0   TTR:  74.0 % (1 y)   Target end date:  Indefinite   Send INR reminders to:  [facility]    Indications    Splenic infarct [D73.5]           Comments:           Anticoagulation Care Providers     Provider Role Specialty Phone number    Jose Ramon Bolton MD Referring Family Medicine [phone]    Rosa Maria Alcaraz MD Referring Family Medicine [phone]    Chris Pan MD Responsible Family Medicine [phone]

## 2022-04-24 ENCOUNTER — NURSE TRIAGE (OUTPATIENT)
Dept: NURSING | Facility: CLINIC | Age: 62
End: 2022-04-24
Payer: COMMERCIAL

## 2022-04-25 ENCOUNTER — TELEPHONE (OUTPATIENT)
Dept: ANTICOAGULATION | Facility: CLINIC | Age: 62
End: 2022-04-25
Payer: COMMERCIAL

## 2022-04-25 NOTE — TELEPHONE ENCOUNTER
"Spoke with Silvia (cold transfer to [facility] backline) and she states that she has had the stomach flu over the weekend with some diarrhea so when she called the triage line the on-call dr told her to hold her coumadin yesterday. She states she is still not eating \"normal\" but is on the mend. She does have an INR scheduled next week on 5/4/22. I have advised her to just resume her coumadin maintenance dose today and recheck next week as planned. If she continue to not eat or the diarrhea get worse to call back. Patient verbalized understanding and agrees with plan of care. Pt had no further questions or concerns at this time.     Nisreen Powell, RN, BSN  [facility] Anticoagulation Team      "

## 2022-05-03 ENCOUNTER — LAB (OUTPATIENT)
Dept: LAB | Facility: CLINIC | Age: 62
End: 2022-05-03
Payer: COMMERCIAL

## 2022-05-03 ENCOUNTER — ANTICOAGULATION THERAPY VISIT (OUTPATIENT)
Dept: ANTICOAGULATION | Facility: CLINIC | Age: 62
End: 2022-05-03

## 2022-05-03 DIAGNOSIS — D73.5 SPLENIC INFARCT: Primary | ICD-10-CM

## 2022-05-03 DIAGNOSIS — D68.62 LUPUS ANTICOAGULANT DISORDER (H): ICD-10-CM

## 2022-05-03 DIAGNOSIS — D73.5 SPLENIC INFARCT: ICD-10-CM

## 2022-05-03 LAB — INR BLD: 5.1 (ref 0.9–1.1)

## 2022-05-03 PROCEDURE — 36416 COLLJ CAPILLARY BLOOD SPEC: CPT

## 2022-05-03 PROCEDURE — 85610 PROTHROMBIN TIME: CPT

## 2022-05-03 NOTE — PROGRESS NOTES
ANTICOAGULATION MANAGEMENT     Silvia Martinez 62 year old female is on warfarin with supratherapeutic INR result. (Goal INR 2.0-3.0)    Recent labs: (last 7 days)     05/03/22  1008   INR 5.1*       ASSESSMENT       Source(s): Chart Review, Patient/Caregiver Call and Template       Warfarin doses taken: Warfarin taken as instructed    Diet: Stomach flu or food poisoning ( 4/22-4/27) may be affecting diet and INR - per patient, appetite is back to normal now.    Had alcoholic drinks while she was on their trip which may be affecting INR    New illness, injury, or hospitalization: Yes: Very ill with stomach flu or food poisoning 4/22-4/27 - diarrhea stopped by 4/28    Medication/supplement changes: Omeprazole only took as needed - last dose taken on 4/30th  which has potential for interaction; increasing INR    Tylenol as needed use which has potential for interaction; increasing INR    Augmentin 5 day course (dates: last dose on 4/22) which has potential for interaction; increasing INR    Signs or symptoms of bleeding or clotting: No    Previous INR: Therapeutic last visit; previously outside of goal range       Additional findings: Was on a trip to [place] 4/28-5/2     Planned trip to [place] on  5/5 - 5/9        PLAN     Recommended plan for temporary change(s) affecting INR     Dosing Instructions: hold 2 doses then continue your current warfarin dose with next INR in 3 days       Summary  As of 5/3/2022    Full warfarin instructions:  5/3: Hold; 5/4: Hold; Otherwise 7.5 mg every Sun, Wed; 5 mg all other days   Next INR check:  5/6/2022             Telephone call with Silvia who verbalizes understanding and agrees to plan and who agrees to plan and repeated back plan correctly    Patient elected to schedule next visit 5/10 due to she will going out of town     Education provided: Importance of therapeutic range, Importance of following up at instructed interval, Monitoring for bleeding signs and symptoms and When to  seek medical attention/emergency care    Plan made per [facility] anticoagulation protocol    Gabrielle Britton RN  Anticoagulation Clinic  5/3/2022    _______________________________________________________________________     Anticoagulation Episode Summary     Current INR goal:  2.0-3.0   TTR:  71.1 % (1 y)   Target end date:  Indefinite   Send INR reminders to:  [facility]    Indications    Splenic infarct [D73.5]           Comments:           Anticoagulation Care Providers     Provider Role Specialty Phone number    Jose Ramon Bolton MD Referring Family Medicine [phone]    Rosa Maria Alcaraz MD Referring Family Medicine [phone]    Chris Pan MD Responsible Family Medicine [phone]

## 2022-05-10 ENCOUNTER — ANTICOAGULATION THERAPY VISIT (OUTPATIENT)
Dept: ANTICOAGULATION | Facility: CLINIC | Age: 62
End: 2022-05-10

## 2022-05-10 ENCOUNTER — LAB (OUTPATIENT)
Dept: LAB | Facility: CLINIC | Age: 62
End: 2022-05-10
Payer: COMMERCIAL

## 2022-05-10 DIAGNOSIS — D73.5 SPLENIC INFARCT: ICD-10-CM

## 2022-05-10 DIAGNOSIS — D68.62 LUPUS ANTICOAGULANT DISORDER (H): ICD-10-CM

## 2022-05-10 DIAGNOSIS — D73.5 SPLENIC INFARCT: Primary | ICD-10-CM

## 2022-05-10 LAB — INR BLD: 2.2 (ref 0.9–1.1)

## 2022-05-10 PROCEDURE — 36416 COLLJ CAPILLARY BLOOD SPEC: CPT

## 2022-05-10 PROCEDURE — 85610 PROTHROMBIN TIME: CPT

## 2022-05-10 NOTE — PROGRESS NOTES
ANTICOAGULATION MANAGEMENT     Silvia Martinez 62 year old female is on warfarin with therapeutic INR result. (Goal INR 2.0-3.0)    Recent labs: (last 7 days)     05/10/22  0802   INR 2.2*       ASSESSMENT       Source(s): Chart Review and Template       Warfarin doses taken: Warfarin recently held for INR 5.1 which may be affecting INR    Diet: No new diet changes identified    New illness, injury, or hospitalization: No    Medication/supplement changes: Tylenol as needed use which has potential for interaction; increasing INR    Omeprazole took one dose but this was prior to last INR done    Signs or symptoms of bleeding or clotting: No    Previous INR: Supratherapeutic most likely due to stomach flu symptoms    Additional findings: Was in [place] 5/5-5/9/22       PLAN     Recommended plan for temporary change(s) affecting INR     Dosing Instructions: continue your current warfarin dose with next INR in 10 days       Summary  As of 5/10/2022    Full warfarin instructions:  7.5 mg every Sun, Wed; 5 mg all other days   Next INR check:  5/20/2022             Telephone call with Silvia who verbalizes understanding and agrees to plan    Patient elected to schedule next visit 5/25    Education provided: Importance of therapeutic range, Importance of following up at instructed interval and Monitoring for bleeding signs and symptoms    Plan made per [facility] anticoagulation protocol    Gabrielle Britton RN  Anticoagulation Clinic  5/10/2022    _______________________________________________________________________     Anticoagulation Episode Summary     Current INR goal:  2.0-3.0   TTR:  69.7 % (1 y)   Target end date:  Indefinite   Send INR reminders to:  [facility]    Indications    Splenic infarct [D73.5]           Comments:           Anticoagulation Care Providers     Provider Role Specialty Phone number    Jose Ramon Bolton MD Referring Family Medicine [phone]    Rosa Maria Alcaraz MD Referring Family Medicine  [phone]    Chris Pan MD [facility] [phone]

## 2022-05-11 ENCOUNTER — HOSPITAL ENCOUNTER (OUTPATIENT)
Dept: PHYSICAL THERAPY | Facility: REHABILITATION | Age: 62
Discharge: HOME | End: 2022-05-11
Payer: COMMERCIAL

## 2022-05-11 DIAGNOSIS — G89.29 CHRONIC BILATERAL LOW BACK PAIN WITHOUT SCIATICA: Primary | ICD-10-CM

## 2022-05-11 DIAGNOSIS — M54.50 CHRONIC BILATERAL LOW BACK PAIN WITHOUT SCIATICA: Primary | ICD-10-CM

## 2022-05-11 PROCEDURE — 97110 THERAPEUTIC EXERCISES: CPT | Mod: GP | Performed by: PHYSICAL THERAPIST

## 2022-05-11 PROCEDURE — 97140 MANUAL THERAPY 1/> REGIONS: CPT | Mod: GP | Performed by: PHYSICAL THERAPIST

## 2022-05-18 ENCOUNTER — HOSPITAL ENCOUNTER (OUTPATIENT)
Dept: PHYSICAL THERAPY | Facility: REHABILITATION | Age: 62
Discharge: HOME | End: 2022-05-18
Payer: COMMERCIAL

## 2022-05-18 DIAGNOSIS — G89.29 CHRONIC BILATERAL LOW BACK PAIN WITHOUT SCIATICA: Primary | ICD-10-CM

## 2022-05-18 DIAGNOSIS — M54.50 CHRONIC BILATERAL LOW BACK PAIN WITHOUT SCIATICA: Primary | ICD-10-CM

## 2022-05-18 PROCEDURE — 97140 MANUAL THERAPY 1/> REGIONS: CPT | Mod: GP | Performed by: PHYSICAL THERAPIST

## 2022-05-18 PROCEDURE — 97110 THERAPEUTIC EXERCISES: CPT | Mod: GP | Performed by: PHYSICAL THERAPIST

## 2022-05-27 ENCOUNTER — ANTICOAGULATION THERAPY VISIT (OUTPATIENT)
Dept: ANTICOAGULATION | Facility: CLINIC | Age: 62
End: 2022-05-27

## 2022-05-27 ENCOUNTER — LAB (OUTPATIENT)
Dept: LAB | Facility: CLINIC | Age: 62
End: 2022-05-27
Payer: COMMERCIAL

## 2022-05-27 DIAGNOSIS — D73.5 SPLENIC INFARCT: Primary | ICD-10-CM

## 2022-05-27 DIAGNOSIS — D68.62 LUPUS ANTICOAGULANT DISORDER (H): ICD-10-CM

## 2022-05-27 DIAGNOSIS — D73.5 SPLENIC INFARCT: ICD-10-CM

## 2022-05-27 LAB — INR BLD: 3 (ref 0.9–1.1)

## 2022-05-27 PROCEDURE — 36416 COLLJ CAPILLARY BLOOD SPEC: CPT

## 2022-05-27 PROCEDURE — 85610 PROTHROMBIN TIME: CPT

## 2022-05-27 NOTE — PROGRESS NOTES
ANTICOAGULATION MANAGEMENT     Silvia Martinez 62 year old female is on warfarin with therapeutic INR result. (Goal INR 2.0-3.0)    Recent labs: (last 7 days)     05/27/22  1023   INR 3.0*       ASSESSMENT       Source(s): Chart Review and Template       Warfarin doses taken: Warfarin taken as instructed    Diet: No new diet changes identified    New illness, injury, or hospitalization: No    Medication/supplement changes: Omeprazole 2-3 doses in the last 1 week which has potential for interaction; increasing INR - per patient she is not planning to take any more more doses.    Signs or symptoms of bleeding or clotting: No    Previous INR: Therapeutic last visit; previously outside of goal range    Additional findings: None       PLAN     Recommended plan for temporary change(s) affecting INR     Dosing Instructions: continue your current warfarin dose with next INR in 4 weeks       Summary  As of 5/27/2022    Full warfarin instructions:  7.5 mg every Sun, Wed; 5 mg all other days   Next INR check:  6/24/2022             Telephone call with Silvia who verbalizes understanding and agrees to plan    Patient elected to schedule next visit 6/29/2022 due to they are going out of town    Education provided: Importance of therapeutic range and Potential interaction between warfarin and omeprazole    Plan made per [facility] anticoagulation protocol    Gabrielle Britton RN  Anticoagulation Clinic  5/27/2022    _______________________________________________________________________     Anticoagulation Episode Summary     Current INR goal:  2.0-3.0   TTR:  71.5 % (1 y)   Target end date:  Indefinite   Send INR reminders to:  [facility]    Indications    Splenic infarct [D73.5]           Comments:           Anticoagulation Care Providers     Provider Role Specialty Phone number    Jose Ramon Bolton MD Referring Family Medicine [phone]    Rosa Maria Alcaraz MD Referring Family Medicine [phone]    Chris Pan MD  [facility] Medicine [phone]

## 2022-06-14 ENCOUNTER — HOSPITAL ENCOUNTER (OUTPATIENT)
Dept: PHYSICAL THERAPY | Facility: REHABILITATION | Age: 62
Discharge: HOME | End: 2022-06-14
Payer: COMMERCIAL

## 2022-06-14 DIAGNOSIS — G89.29 CHRONIC BILATERAL LOW BACK PAIN WITHOUT SCIATICA: Primary | ICD-10-CM

## 2022-06-14 DIAGNOSIS — M54.50 CHRONIC BILATERAL LOW BACK PAIN WITHOUT SCIATICA: Primary | ICD-10-CM

## 2022-06-14 PROCEDURE — 97140 MANUAL THERAPY 1/> REGIONS: CPT | Mod: GP | Performed by: PHYSICAL THERAPIST

## 2022-06-14 PROCEDURE — 97110 THERAPEUTIC EXERCISES: CPT | Mod: GP | Performed by: PHYSICAL THERAPIST

## 2022-06-29 ENCOUNTER — ANTICOAGULATION THERAPY VISIT (OUTPATIENT)
Dept: ANTICOAGULATION | Facility: CLINIC | Age: 62
End: 2022-06-29

## 2022-06-29 ENCOUNTER — LAB (OUTPATIENT)
Dept: LAB | Facility: CLINIC | Age: 62
End: 2022-06-29
Payer: COMMERCIAL

## 2022-06-29 ENCOUNTER — HOSPITAL ENCOUNTER (OUTPATIENT)
Dept: PHYSICAL THERAPY | Facility: REHABILITATION | Age: 62
Discharge: HOME | End: 2022-06-29

## 2022-06-29 DIAGNOSIS — D73.5 SPLENIC INFARCT: ICD-10-CM

## 2022-06-29 DIAGNOSIS — G89.29 CHRONIC BILATERAL LOW BACK PAIN WITHOUT SCIATICA: Primary | ICD-10-CM

## 2022-06-29 DIAGNOSIS — M54.50 CHRONIC BILATERAL LOW BACK PAIN WITHOUT SCIATICA: Primary | ICD-10-CM

## 2022-06-29 DIAGNOSIS — D68.62 LUPUS ANTICOAGULANT DISORDER (H): ICD-10-CM

## 2022-06-29 DIAGNOSIS — D73.5 SPLENIC INFARCT: Primary | ICD-10-CM

## 2022-06-29 LAB — INR BLD: 1.7 (ref 0.9–1.1)

## 2022-06-29 PROCEDURE — 85610 PROTHROMBIN TIME: CPT

## 2022-06-29 PROCEDURE — 97140 MANUAL THERAPY 1/> REGIONS: CPT | Mod: GP | Performed by: PHYSICAL THERAPIST

## 2022-06-29 PROCEDURE — 36416 COLLJ CAPILLARY BLOOD SPEC: CPT

## 2022-06-29 NOTE — PROGRESS NOTES
ANTICOAGULATION MANAGEMENT     Silvia Martinez 62 year old female is on warfarin with subtherapeutic INR result. (Goal INR 2.0-3.0)    Recent labs: (last 7 days)     06/29/22  1206   INR 1.7*       ASSESSMENT       Source(s): Chart Review and Template       Warfarin doses taken: Missed dose(s) may be affecting INR    Diet: No new diet changes identified    New illness, injury, or hospitalization: Yes: sore throat, drainage from allergy    Medication/supplement changes: Tylenol as needed use which has potential for interaction; increasing INR    coricidin as needed use No interaction anticipated    Signs or symptoms of bleeding or clotting: No    Previous INR: Therapeutic last 2(+) visits    Additional findings: Noted that patient is establishing care with new PCP at [facility] ( [facility]) OV is on 8/15/22       PLAN     Recommended plan for temporary change(s) affecting INR     Dosing Instructions: booster dose then continue your current warfarin dose with next INR in 2 weeks       Summary  As of 6/29/2022    Full warfarin instructions:  6/29: 10 mg; Otherwise 7.5 mg every Sun, Wed; 5 mg all other days   Next INR check:  7/13/2022             Detailed voice message left for Silvia with dosing instructions and follow up date.     Contact [phone] to schedule and with any changes, questions or concerns.     Education provided: Importance of therapeutic range, Importance of following up at instructed interval, Importance of taking warfarin as instructed, Potential interaction between warfarin and tylenol and No interaction anticipated between warfarin and coricidin    Plan made per [facility] anticoagulation protocol    Gabrielle Britton RN  Anticoagulation Clinic  6/29/2022    _______________________________________________________________________     Anticoagulation Episode Summary     Current INR goal:  2.0-3.0   TTR:  71.8 % (1 y)   Target end date:  Indefinite   Send INR reminders to:  [facility]    Indications     Splenic infarct [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

## 2022-07-13 ENCOUNTER — ANTICOAGULATION THERAPY VISIT (OUTPATIENT)
Dept: ANTICOAGULATION | Facility: CLINIC | Age: 62
End: 2022-07-13

## 2022-07-13 ENCOUNTER — LAB (OUTPATIENT)
Dept: LAB | Facility: CLINIC | Age: 62
End: 2022-07-13
Payer: COMMERCIAL

## 2022-07-13 DIAGNOSIS — D73.5 SPLENIC INFARCT: Primary | ICD-10-CM

## 2022-07-13 DIAGNOSIS — D73.5 SPLENIC INFARCT: ICD-10-CM

## 2022-07-13 DIAGNOSIS — D68.62 LUPUS ANTICOAGULANT DISORDER (H): ICD-10-CM

## 2022-07-13 LAB — INR BLD: 2.7 (ref 0.9–1.1)

## 2022-07-13 PROCEDURE — 85610 PROTHROMBIN TIME: CPT

## 2022-07-13 PROCEDURE — 36416 COLLJ CAPILLARY BLOOD SPEC: CPT

## 2022-07-13 NOTE — PROGRESS NOTES
ANTICOAGULATION MANAGEMENT     Silvia Martinez 62 year old female is on warfarin with therapeutic INR result. (Goal INR 2.0-3.0)    Recent labs: (last 7 days)     07/13/22  1115   INR 2.7*       ASSESSMENT       Source(s): Chart Review and Template       Warfarin doses taken: Warfarin taken as instructed    Diet: No new diet changes identified    New illness, injury, or hospitalization: No    Medication/supplement changes: None noted    Signs or symptoms of bleeding or clotting: No    Previous INR: Subtherapeutic most likely due to missed dose.    Additional findings: None       PLAN     Recommended plan for no diet, medication or health factor changes affecting INR     Dosing Instructions: continue your current warfarin dose with next INR in 2-3 weeks       Summary  As of 7/13/2022    Full warfarin instructions:  7.5 mg every Sun, Wed; 5 mg all other days   Next INR check:  8/3/2022             Detailed voice message left for Silvia with dosing instructions and follow up date.     Contact [phone] to schedule and with any changes, questions or concerns.     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

## 2022-08-03 ENCOUNTER — ANTICOAGULATION THERAPY VISIT (OUTPATIENT)
Dept: ANTICOAGULATION | Facility: CLINIC | Age: 62
End: 2022-08-03

## 2022-08-03 ENCOUNTER — LAB (OUTPATIENT)
Dept: LAB | Facility: CLINIC | Age: 62
End: 2022-08-03
Payer: COMMERCIAL

## 2022-08-03 DIAGNOSIS — D73.5 SPLENIC INFARCT: Primary | ICD-10-CM

## 2022-08-03 DIAGNOSIS — D73.5 SPLENIC INFARCT: ICD-10-CM

## 2022-08-03 DIAGNOSIS — D68.62 LUPUS ANTICOAGULANT DISORDER (H): ICD-10-CM

## 2022-08-03 LAB — INR BLD: 3.1 (ref 0.9–1.1)

## 2022-08-03 PROCEDURE — 36416 COLLJ CAPILLARY BLOOD SPEC: CPT

## 2022-08-03 PROCEDURE — 85610 PROTHROMBIN TIME: CPT

## 2022-08-03 NOTE — PROGRESS NOTES
ANTICOAGULATION MANAGEMENT     Silvia Martinez 62 year old female is on warfarin with supratherapeutic INR result. (Goal INR 2.0-3.0)    Recent labs: (last 7 days)     08/03/22  0855   INR 3.1*       ASSESSMENT       Source(s): Chart Review and Template       Warfarin doses taken: Warfarin taken as instructed    Diet: Decreased greens/vitamin K in diet; plans to resume previous intake was helping with son's move and did not eat per usual routine.    New illness, injury, or hospitalization: No    Medication/supplement changes: None noted    Signs or symptoms of bleeding or clotting: No    Previous INR: Therapeutic last visit; previously outside of goal range    Additional findings: None       PLAN     Recommended plan for no diet, medication or health factor changes affecting INR     Dosing Instructions: Continue your current warfarin dose with next INR in 2-3 weeks       Summary  As of 8/3/2022    Full warfarin instructions:  7.5 mg every Sun, Wed; 5 mg all other days   Next INR check:  8/24/2022             Telephone call with Silvia who verbalizes understanding and agrees to plan    Patient elected to schedule next visit 8/30/22    Education provided: Importance of consistent vitamin K intake, Impact of vitamin K foods on INR, Goal range and significance of current result, Importance of therapeutic range, Importance of following up at instructed interval and Contact [phone] with any changes, questions or concerns.     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

## 2022-09-06 ENCOUNTER — LAB (OUTPATIENT)
Dept: LAB | Facility: CLINIC | Age: 62
End: 2022-09-06
Payer: COMMERCIAL

## 2022-09-06 ENCOUNTER — ANTICOAGULATION THERAPY VISIT (OUTPATIENT)
Dept: ANTICOAGULATION | Facility: CLINIC | Age: 62
End: 2022-09-06

## 2022-09-06 DIAGNOSIS — D68.62 LUPUS ANTICOAGULANT DISORDER (H): ICD-10-CM

## 2022-09-06 DIAGNOSIS — D73.5 SPLENIC INFARCT: Primary | ICD-10-CM

## 2022-09-06 DIAGNOSIS — D73.5 SPLENIC INFARCT: ICD-10-CM

## 2022-09-06 LAB — INR BLD: 3.3 (ref 0.9–1.1)

## 2022-09-06 PROCEDURE — 85610 PROTHROMBIN TIME: CPT

## 2022-09-06 PROCEDURE — 36416 COLLJ CAPILLARY BLOOD SPEC: CPT

## 2022-09-06 NOTE — PROGRESS NOTES
"ANTICOAGULATION MANAGEMENT     Silvia Martinez 62 year old female is on warfarin with supratherapeutic INR result. (Goal INR 2.0-3.0)    Recent labs: (last 7 days)     09/06/22  1016   INR 3.3*       ASSESSMENT       Source(s): Chart Review, Patient/Caregiver Call and Template       Warfarin doses taken: Warfarin taken as instructed    Diet: usually has a glass of wine ~3/week, did not have any this past week; usually also has a few salads a week, which she also did not have    New illness, injury, or hospitalization: No    Medication/supplement changes: None noted    Signs or symptoms of bleeding or clotting: No    Previous INR: Supratherapeutic    Additional findings: None       PLAN     Recommended plan for temporary change(s) affecting INR     Dosing Instructions: decrease your warfarin dose (6.2% change) with next INR in 2 weeks       Summary  As of 9/6/2022    Full warfarin instructions:  7.5 mg every Sun; 5 mg all other days   Next INR check:  9/21/2022             Telephone call with Silvia who agrees to plan and repeated back plan correctly. Elected to decrease dose as INR has been high recently and patient believes a dose adjustment would be beneficial since she didn't have wine or salad which usually \"cancel each other out\". ACN agreed to small dose adjustment    Lab visit scheduled    Education provided: Contact [phone] with any changes, questions or concerns.     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

## 2022-09-07 ENCOUNTER — TRANSFERRED RECORDS (OUTPATIENT)
Dept: HEALTH INFORMATION MANAGEMENT | Facility: CLINIC | Age: 62
End: 2022-09-07

## 2022-09-13 ENCOUNTER — OFFICE VISIT (OUTPATIENT)
Dept: FAMILY MEDICINE | Facility: CLINIC | Age: 62
End: 2022-09-13
Payer: COMMERCIAL

## 2022-09-13 VITALS
HEART RATE: 64 BPM | DIASTOLIC BLOOD PRESSURE: 82 MMHG | SYSTOLIC BLOOD PRESSURE: 110 MMHG | WEIGHT: 200.3 LBS | BODY MASS INDEX: 32.33 KG/M2

## 2022-09-13 DIAGNOSIS — I77.3 ARTERIAL FIBROMUSCULAR DYSPLASIA (H): ICD-10-CM

## 2022-09-13 DIAGNOSIS — E03.9 ACQUIRED HYPOTHYROIDISM: ICD-10-CM

## 2022-09-13 DIAGNOSIS — D68.62 LUPUS ANTICOAGULANT DISORDER (H): ICD-10-CM

## 2022-09-13 DIAGNOSIS — Z76.89 ENCOUNTER TO ESTABLISH CARE: Primary | ICD-10-CM

## 2022-09-13 DIAGNOSIS — N95.2 ATROPHIC VAGINITIS: ICD-10-CM

## 2022-09-13 DIAGNOSIS — Z90.710 HISTORY OF TOTAL HYSTERECTOMY: ICD-10-CM

## 2022-09-13 PROCEDURE — 90682 RIV4 VACC RECOMBINANT DNA IM: CPT | Performed by: FAMILY MEDICINE

## 2022-09-13 PROCEDURE — 90471 IMMUNIZATION ADMIN: CPT | Performed by: FAMILY MEDICINE

## 2022-09-13 PROCEDURE — 99214 OFFICE O/P EST MOD 30 MIN: CPT | Mod: 25 | Performed by: FAMILY MEDICINE

## 2022-09-13 NOTE — PROGRESS NOTES
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 We can certainly take this over for her down the road for Rx prescribing.     Acquired hypothyroidism  Stable, managed previously with [place] internist; pt might consider transitioning Rx for levothyroxine over to new PCP    Atrophic vaginitis  Stable, managed previously with [place] internist; pt might consider transitioning Rx for estrace cream over to new PCP      History of total hysterectomy  Not requiring paps any longer    35 minutes spent on the date of the encounter doing chart review, patient visit and documentation.             BMI:   Estimated body mass index is 32.33 kg/m  as calculated from the following:    Height as of 1/14/22: 1.676 m (5' 6\").    Weight as of this encounter: 90.9 kg (200 lb 4.8 oz).           Return in about 1 year (around 9/13/2023) for Annual physical.    Maite Mcallister MD  [facility] LUBNA Vega is a 62 year old, presenting for the following health issues:  Establish Care (Stabbing pain behind left shoulder blade that started less than an hour ago)    Here to establish care.     Also briefly reviewed she started having a stabbing pain at left shoulder blade starting about 1hr ago; the only activity she did today was unloading the  and she feels like it is potentially a pulled muscle but cannot conceive of how this could have happened just with the  activity.  It is tender to palpation and with certain stretching of her shoulder movements. We reviewed to use heat/ice given palpable tenderness likely MSK origin.  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 Her dad  1 year ago from Lewy body dementia.  Her mom Adry is 86+ and declining majorly- lives in independent care at [facility] (her mom \"refuses\" to go anywhere else); has a caregiver to get her up and down to bed each day. Silvia does all the help for IADLs and wound cares.     She gained 10lbs with the stressors of caregiving but aims to get this back down again soon. She was able to successfully lose about 20lbs prior to that.     The following exerpt from an excellent [facility] visit on 2021 summarizes her medical conditions: Autoimmune thyroid disease and hypothyroidism, on replacement- Dr Coles is her [facility] internist  She would like to change her meds over to me as prescriber.   Patient has been on thyroid replacement for many years. She is currently on Synthroid 100 mcg daily. She is clinically euthyroid.    Fibromuscular dysplasia (FMD)  Splenic artery dissection with splenic infarction  Lupus anticoagulant syndrome- found incidentally after her FMD dx  Long-term anticoagulant treatment   Patient continues on warfarin. Dr. Norman Sexton, a vascular specialist, who has managed her care for many years.  She would anticipate a bridge for any surgery   Osteopenia  Osteoporosis family history   Surgical menopause and vaginal atrophy -  Patient had BMD in 2019, not due again until next " year, 2022 at [place]. She is on cholecalciferol 1000 units 4 days weekly and calcium in her diet. Does weightbearing exercise and activity  .   On estradiol (Estrace) vaginal cream 2-3 days a week for atrophic vaginitis with good benefit.    Steatohepatitis   This was seen on imaging incidentally. ALT and alk phos have been entirely normal. Weight loss will be helpful for this.        History of Present Illness       Reason for visit:  To establish care with Dr. Mcallister    She eats 2-3 servings of fruits and vegetables daily.She consumes 0 sweetened beverage(s) daily.She exercises with enough effort to increase her heart rate 30 to 60 minutes per day.  She exercises with enough effort to increase her heart rate 3 or less days per week.   She is taking medications regularly.             Review of Systems   Constitutional, HEENT, cardiovascular, pulmonary, gi and gu systems are negative, except as otherwise noted.      Objective    /82 (BP Location: Left arm, Patient Position: Sitting, Cuff Size: Adult Large)   Pulse 64   Wt 90.9 kg (200 lb 4.8 oz)   BMI 32.33 kg/m    Body mass index is 32.33 kg/m .  Physical Exam   GENERAL: healthy, alert and no distress  NECK: no adenopathy, no asymmetry, masses, or scars and thyroid normal to palpation  RESP: lungs clear to auscultation - no rales, rhonchi or wheezes  CV: regular rate and rhythm, normal S1 S2, no S3 or S4, no murmur, click or rub, no peripheral edema and peripheral pulses strong  ABDOMEN: soft, nontender, no hepatosplenomegaly, no masses and bowel sounds normal  MS: no gross musculoskeletal defects noted, no edema, the left upper shoulder trapezius muscle is tender to palpation and reproducible; normal range of motion of the upper arms

## 2022-09-21 ENCOUNTER — MYC MEDICAL ADVICE (OUTPATIENT)
Dept: ANTICOAGULATION | Facility: CLINIC | Age: 62
End: 2022-09-21

## 2022-09-21 ENCOUNTER — LAB (OUTPATIENT)
Dept: LAB | Facility: CLINIC | Age: 62
End: 2022-09-21
Payer: COMMERCIAL

## 2022-09-21 ENCOUNTER — ANTICOAGULATION THERAPY VISIT (OUTPATIENT)
Dept: ANTICOAGULATION | Facility: CLINIC | Age: 62
End: 2022-09-21

## 2022-09-21 DIAGNOSIS — D68.62 LUPUS ANTICOAGULANT DISORDER (H): ICD-10-CM

## 2022-09-21 DIAGNOSIS — D73.5 SPLENIC INFARCT: ICD-10-CM

## 2022-09-21 LAB — INR BLD: 3.2 (ref 0.9–1.1)

## 2022-09-21 PROCEDURE — 36416 COLLJ CAPILLARY BLOOD SPEC: CPT

## 2022-09-21 PROCEDURE — 85610 PROTHROMBIN TIME: CPT

## 2022-09-21 NOTE — PROGRESS NOTES
ANTICOAGULATION MANAGEMENT     Silvia Martinez 62 year old female is on warfarin with supratherapeutic INR result. (Goal INR 2.0-3.0)    Recent labs: (last 7 days)     09/21/22  1018   INR 3.2*       ASSESSMENT       Source(s): Chart Review and Template       Warfarin doses taken: Warfarin taken as instructed    Diet: No new diet changes identified    New illness, injury, or hospitalization: No    Medication/supplement changes: None noted    Signs or symptoms of bleeding or clotting: No    Previous INR: Supratherapeutic    Additional findings: None       PLAN     Recommended plan for no diet, medication or health factor changes affecting INR     Dosing Instructions: decrease your warfarin dose (6.7% change) with next INR in 2 weeks       Summary  As of 9/21/2022    Full warfarin instructions:  5 mg every day   Next INR check:  10/5/2022             Detailed voice message left for Silvia with dosing instructions and follow up date.   Sent [employer] message with dosing and follow up instructions    Contact [phone] to schedule and with any changes, questions or concerns.     Education provided: Please call back if any changes to your diet, medications or how you've been taking warfarin and Goal range and significance of current result    Plan made per [facility] anticoagulation protocol    Kathy Chapin RN  Anticoagulation Clinic  9/21/2022    _______________________________________________________________________     Anticoagulation Episode Summary     Current INR goal:  2.0-3.0   TTR:  55.8 % (1 y)   Target end date:  Indefinite   Send INR reminders to:  [facility]    Indications    Splenic infarct (Resolved) [D73.5]           Comments:           Anticoagulation Care Providers     Provider Role Specialty Phone number    Jose Ramon Bolton MD Referring Family Medicine [phone]    Rosa Maria Alcaraz MD Referring Family Medicine [phone]    Chris Pan MD Responsible Family Medicine [phone]

## 2022-10-01 ENCOUNTER — HEALTH MAINTENANCE LETTER (OUTPATIENT)
Age: 62
End: 2022-10-01

## 2022-10-05 ENCOUNTER — LAB (OUTPATIENT)
Dept: LAB | Facility: CLINIC | Age: 62
End: 2022-10-05
Payer: COMMERCIAL

## 2022-10-05 ENCOUNTER — ANTICOAGULATION THERAPY VISIT (OUTPATIENT)
Dept: ANTICOAGULATION | Facility: CLINIC | Age: 62
End: 2022-10-05

## 2022-10-05 DIAGNOSIS — D73.5 SPLENIC INFARCT: ICD-10-CM

## 2022-10-05 DIAGNOSIS — D68.62 LUPUS ANTICOAGULANT DISORDER (H): ICD-10-CM

## 2022-10-05 LAB — INR BLD: 2.2 (ref 0.9–1.1)

## 2022-10-05 PROCEDURE — 36416 COLLJ CAPILLARY BLOOD SPEC: CPT

## 2022-10-05 PROCEDURE — 85610 PROTHROMBIN TIME: CPT

## 2022-10-05 RX ORDER — NITROFURANTOIN 25; 75 MG/1; MG/1
CAPSULE ORAL
COMMUNITY
Start: 2022-10-03 | End: 2022-10-26

## 2022-10-05 NOTE — PROGRESS NOTES
ANTICOAGULATION MANAGEMENT     Silvia Martinez 62 year old female is on warfarin with therapeutic INR result. (Goal INR 2.0-3.0)    Recent labs: (last 7 days)     10/05/22  0911   INR 2.2*       ASSESSMENT       Source(s): Chart Review, Patient/Caregiver Call and Template       Warfarin doses taken: Warfarin taken as instructed    Diet: will not be having wine while on ABX (usually has 1-2 glasses a couple times a week); contemplating cutting back on greens to allow INR to come up a little bit    New illness, injury, or hospitalization: Yes: UTI    Medication/supplement changes: nitrofurantoin 5 day course (dates: 10/3-10/7) No interaction anticipated    Signs or symptoms of bleeding or clotting: No    Previous INR: Supratherapeutic    Additional findings: flying to [place] tomorrow, will be gone for 5 days       PLAN     Recommended plan for temporary change(s) affecting INR     Dosing Instructions: Continue your current warfarin dose with next INR in 2 weeks       Summary  As of 10/5/2022    Full warfarin instructions:  5 mg every day   Next INR check:  10/19/2022             Telephone call with Silvia who verbalizes understanding and agrees to plan    lab appt already scheduled    Education provided: When to seek medical attention/emergency care, Travel related clotting risk and prevention and Contact [phone] with any changes, questions or concerns.     Plan made per [facility] anticoagulation protocol    Kathy Chapin RN  Anticoagulation Clinic  10/5/2022    _______________________________________________________________________     Anticoagulation Episode Summary     Current INR goal:  2.0-3.0   TTR:  55.0 % (1 y)   Target end date:  Indefinite   Send INR reminders to:  [facility]    Indications    Splenic infarct (Resolved) [D73.5]           Comments:           Anticoagulation Care Providers     Provider Role Specialty Phone number    Jose Ramon Bolton MD Referring Family Medicine [phone]    Rosa Maria Alcaraz  MD Referring Family Medicine [phone]    Chris Pan MD Responsible Family Medicine [phone]

## 2022-10-19 ENCOUNTER — ANTICOAGULATION THERAPY VISIT (OUTPATIENT)
Dept: ANTICOAGULATION | Facility: CLINIC | Age: 62
End: 2022-10-19

## 2022-10-19 ENCOUNTER — LAB (OUTPATIENT)
Dept: LAB | Facility: CLINIC | Age: 62
End: 2022-10-19
Payer: COMMERCIAL

## 2022-10-19 DIAGNOSIS — D73.5 SPLENIC INFARCT: ICD-10-CM

## 2022-10-19 DIAGNOSIS — Z13.220 SCREENING FOR HYPERLIPIDEMIA: Primary | ICD-10-CM

## 2022-10-19 DIAGNOSIS — D68.62 LUPUS ANTICOAGULANT DISORDER (H): ICD-10-CM

## 2022-10-19 LAB — INR BLD: 2.4 (ref 0.9–1.1)

## 2022-10-19 PROCEDURE — 36416 COLLJ CAPILLARY BLOOD SPEC: CPT

## 2022-10-19 PROCEDURE — 85610 PROTHROMBIN TIME: CPT

## 2022-10-19 NOTE — PROGRESS NOTES
ANTICOAGULATION MANAGEMENT     Silvia Martinez 62 year old female is on warfarin with therapeutic INR result. (Goal INR 2.0-3.0)    Recent labs: (last 7 days)     10/19/22  0924   INR 2.4*       ASSESSMENT       Source(s): Chart Review and Patient/Caregiver Call       Warfarin doses taken: Warfarin taken as instructed    Diet: No new diet changes identified    New illness, injury, or hospitalization: No    Medication/supplement changes: done with macrobid; has taken tylenol and Nyquil a few times over the past couple of weeks, but nothing consistently    Signs or symptoms of bleeding or clotting: No    Previous INR: Therapeutic last visit; previously outside of goal range    Additional findings: None       PLAN     Recommended plan for no diet, medication or health factor changes affecting INR     Dosing Instructions: Continue your current warfarin dose with next INR in 3 weeks       Summary  As of 10/19/2022    Full warfarin instructions:  5 mg every day   Next INR check:  11/9/2022             Telephone call with Silvia who verbalizes understanding and agrees to plan    Lab visit scheduled    Education provided: Contact [phone] with any changes, questions or concerns.     Plan made per [facility] anticoagulation protocol    Kathy Chapin RN  Anticoagulation Clinic  10/19/2022    _______________________________________________________________________     Anticoagulation Episode Summary     Current INR goal:  2.0-3.0   TTR:  55.0 % (1 y)   Target end date:  Indefinite   Send INR reminders to:  [facility]    Indications    Splenic infarct (Resolved) [D73.5]           Comments:           Anticoagulation Care Providers     Provider Role Specialty Phone number    Jose Ramon Bolton MD Referring Family Medicine [phone]    Rosa Maria Alcaraz MD Referring Family Medicine [phone]    Chris Pan MD Responsible Family Medicine [phone]

## 2022-10-26 ENCOUNTER — NURSE TRIAGE (OUTPATIENT)
Dept: NURSING | Facility: CLINIC | Age: 62
End: 2022-10-26

## 2022-10-26 ENCOUNTER — OFFICE VISIT (OUTPATIENT)
Dept: FAMILY MEDICINE | Facility: CLINIC | Age: 62
End: 2022-10-26
Payer: COMMERCIAL

## 2022-10-26 VITALS
SYSTOLIC BLOOD PRESSURE: 106 MMHG | BODY MASS INDEX: 31.38 KG/M2 | HEIGHT: 66 IN | DIASTOLIC BLOOD PRESSURE: 82 MMHG | OXYGEN SATURATION: 96 % | WEIGHT: 195.25 LBS | HEART RATE: 74 BPM

## 2022-10-26 DIAGNOSIS — J06.9 VIRAL URI WITH COUGH: Primary | ICD-10-CM

## 2022-10-26 LAB — SARS-COV-2 RNA RESP QL NAA+PROBE: NEGATIVE

## 2022-10-26 PROCEDURE — U0003 INFECTIOUS AGENT DETECTION BY NUCLEIC ACID (DNA OR RNA); SEVERE ACUTE RESPIRATORY SYNDROME CORONAVIRUS 2 (SARS-COV-2) (CORONAVIRUS DISEASE [COVID-19]), AMPLIFIED PROBE TECHNIQUE, MAKING USE OF HIGH THROUGHPUT TECHNOLOGIES AS DESCRIBED BY CMS-2020-01-R: HCPCS | Performed by: FAMILY MEDICINE

## 2022-10-26 PROCEDURE — U0005 INFEC AGEN DETEC AMPLI PROBE: HCPCS | Performed by: FAMILY MEDICINE

## 2022-10-26 PROCEDURE — 99213 OFFICE O/P EST LOW 20 MIN: CPT | Mod: CS | Performed by: FAMILY MEDICINE

## 2022-10-26 RX ORDER — CODEINE PHOSPHATE/GUAIFENESIN 10-100MG/5
5 LIQUID (ML) ORAL 3 TIMES DAILY PRN
Qty: 473 ML | Refills: 0 | Status: SHIPPED | OUTPATIENT
Start: 2022-10-26 | End: 2023-03-22

## 2022-10-26 NOTE — PATIENT INSTRUCTIONS
Start flonase for the left eustachian tube dysfunction and throat drainage    Cough syrup will be sent

## 2022-10-26 NOTE — TELEPHONE ENCOUNTER
Triage Call:    Pt calling to report that she has had upper respiratory sx for 6 days now  She has taken two COVID-19 tests and they have been negative    She reports that she is not getting any better with the home care treatment she is doing and would like to be seen by a provider     Sx:   Sinus drainage  Cough  Sore throat is off and on the left side  Left earOne ear hurts off and on    Throat pain with swallowin-8/10  At rest 2-3/10    No fever, no shortness of breath    Taking:   Delsym  Coricidin    Disposition: See in office today. Pt was given the care advice and was given the [place] [facility] She wanted to also be transferred to scheduling to check appt availability.     Alfreda Koroma RN  [facility] Nurse Advisor 7:27 AM 10/26/2022      Reason for Disposition    Sore throat is main symptom    SEVERE sore throat pain    Earache also present    Additional Information    Negative: SEVERE difficulty breathing (e.g., struggling for each breath, speaks in single words)    Negative: Very weak (can't stand)    Negative: Sounds like a life-threatening emergency to the triager    Negative: Difficulty breathing and not from stuffy nose (e.g., not relieved by cleaning out the nose)    Negative: Runny nose is caused by pollen or other allergies    Negative: SEVERE difficulty breathing (e.g., struggling for each breath, speaks in single words)    Negative: Sounds like a life-threatening emergency to the triager    Negative: Throat culture results, call about    Negative: Productive cough is main symptom    Negative: Runny nose is main symptom    Negative: Drooling or spitting out saliva (because can't swallow)    Negative: Unable to open mouth completely    Negative: Drinking very little and has signs of dehydration (e.g., no urine > 12 hours, very dry mouth, very lightheaded)    Negative: Patient sounds very sick or weak to the triager    Negative: Difficulty breathing (per caller) but not  severe    Negative: Fever > 103 F (39.4 C)    Negative: Refuses to drink anything for > 12 hours    Protocols used: COMMON COLD-A-OH, SORE THROAT-A-OH

## 2022-10-26 NOTE — PROGRESS NOTES
"  Assessment & Plan     Viral URI with cough  She has had 6 days of symptoms since Wednesday evening on 10/20.  At this point presumed viral illness particularly with the laryngitis and dry cough.  Lungs clear, SpO2 96%. Minimally erythematous throat and ears are reassuring on exam with only slight eustachian tube dysfunction findings of TM retraction without erythema or bulging on the left side.  We recommended a course of Flonase for that as well as symptomatic treatment of her cough with codeine cough syrup.  She was offered Tessalon Perles but at this point does not feel like there is any production to require that expectorant effect.  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  She understands she is past the treatment window but would like this more for information purposes at this time.  Her tests have been negative at home x2.  She is vaccinated x3.  At this time would defer abx until the 10-14days of sinus sx and at that point she can reach out for rx which I can send if still dealing with those sx. Without fever or lung findings ok to hold off on CXR as well.   - Symptomatic; Yes; 10/19/2022 COVID-19 Virus (Coronavirus) by PCR; Future  - guaiFENesin-codeine (GUAIFENESIN AC) 100-10 MG/5ML syrup; Take 5 mLs by mouth 3 times daily as needed for congestion or cough             BMI:   Estimated body mass index is 31.51 kg/m  as calculated from the following:    Height as of this encounter: 1.676 m (5' 6\").    Weight as of this encounter: 88.6 kg (195 lb 4 oz).           Return in about 4 weeks (around 11/23/2022) for as needed.    Maite Mcallister MD  [facility]    Rosalee Vega is a 62 year old, presenting for the following health issues:  Sick (Cough, sinus pressure, sore throat, ear ache that started almost a week ago. Sore throat " "getting progressively worse. Drainage is clear/yellow in color. Has tried OTC meds with no relief. Two at home Covid tests came back negative. )    She has been sick with respiratory symptoms for 6 days. She picked up this illness from a grandson who had cough and similar illness.    She is taken to COVID-19 test at home x2 which have been negative.    Sinus drainage  Cough- dry, no SOB or wheezing  Sore throat is off and on, mostly at the left side  Left ear -one ear hurts off and on     Throat pain with swallowin-8/10  At rest 2-3/10     No fever, no shortness of breath     Taking:   Delsym  Coricidin    History of Present Illness       Reason for visit:  Cough laryngitis sore throat ear ache  Symptom onset:  3-7 days ago  Symptoms include:  As above  Symptom intensity:  Moderate  Symptom progression:  Worsening  Had these symptoms before:  Yes  Has tried/received treatment for these symptoms:  Yes  Previous treatment was successful:  Yes  Prior treatment description:  Antibiotics OTC meds    She eats 2-3 servings of fruits and vegetables daily.She consumes 0 sweetened beverage(s) daily.She exercises with enough effort to increase her heart rate 10 to 19 minutes per day.  She exercises with enough effort to increase her heart rate 3 or less days per week.   She is taking medications regularly.             Review of Systems    Constitutional, HEENT, cardiovascular, pulmonary, gi and gu systems are negative, except as otherwise noted.      Objective    /82 (BP Location: Left arm, Patient Position: Sitting, Cuff Size: Adult Large)   Pulse 74   Ht 1.676 m (5' 6\")   Wt 88.6 kg (195 lb 4 oz)   SpO2 96%   BMI 31.51 kg/m    Body mass index is 31.51 kg/m .  Physical Exam   GENERAL: healthy, alert and no distress  EYES: Eyes grossly normal to inspection and trigyum  on the left medial eye noted  HENT: normal cephalic/atraumatic, right ear: normal: no effusions, no erythema, normal landmarks, left ear: TM " notable for mild retraction, no erythema or fluid, nose and mouth without ulcers or lesions- mild erythema of nasal septum and clear rhinorrhea noted, oropharynx minimally erythematous and oral mucous membranes moist  NECK: no adenopathy, no asymmetry, masses, or scars and thyroid normal to palpation  RESP: lungs clear to auscultation - no rales, rhonchi or wheezes  CV: regular rate and rhythm, normal S1 S2, no S3 or S4, no murmur, click or rub, no peripheral edema and peripheral pulses strong

## 2022-11-04 ENCOUNTER — DOCUMENTATION ONLY (OUTPATIENT)
Dept: ANTICOAGULATION | Facility: CLINIC | Age: 62
End: 2022-11-04

## 2022-11-04 DIAGNOSIS — D73.5 SPLENIC INFARCT: Primary | ICD-10-CM

## 2022-11-04 DIAGNOSIS — D68.62 LUPUS ANTICOAGULANT DISORDER (H): ICD-10-CM

## 2022-11-07 PROBLEM — D73.5 SPLENIC INFARCT: Status: ACTIVE | Noted: 2022-11-07

## 2022-11-09 ENCOUNTER — ANTICOAGULATION THERAPY VISIT (OUTPATIENT)
Dept: ANTICOAGULATION | Facility: CLINIC | Age: 62
End: 2022-11-09

## 2022-11-09 ENCOUNTER — LAB (OUTPATIENT)
Dept: LAB | Facility: CLINIC | Age: 62
End: 2022-11-09
Payer: COMMERCIAL

## 2022-11-09 DIAGNOSIS — D68.62 LUPUS ANTICOAGULANT DISORDER (H): ICD-10-CM

## 2022-11-09 DIAGNOSIS — D73.5 SPLENIC INFARCT: ICD-10-CM

## 2022-11-09 DIAGNOSIS — D73.5 SPLENIC INFARCT: Primary | ICD-10-CM

## 2022-11-09 LAB — INR BLD: 2.2 (ref 0.9–1.1)

## 2022-11-09 PROCEDURE — 85610 PROTHROMBIN TIME: CPT

## 2022-11-09 PROCEDURE — 36416 COLLJ CAPILLARY BLOOD SPEC: CPT

## 2022-11-09 NOTE — PROGRESS NOTES
ANTICOAGULATION MANAGEMENT     Silvia Martinez 62 year old female is on warfarin with therapeutic INR result. (Goal INR 2.0-3.0)    Recent labs: (last 7 days)     11/09/22  0920   INR 2.2*       ASSESSMENT       Source(s): Chart Review and Template       Warfarin doses taken: Warfarin taken as instructed    Diet: No new diet changes identified    New illness, injury, or hospitalization: recent cold-- resolving    Medication/supplement changes: was using Nyquil and cough syrup with codeine, last used 11/1    Signs or symptoms of bleeding or clotting: No    Previous INR: Therapeutic last 2(+) visits    Additional findings: None       PLAN     Recommended plan for no diet, medication or health factor changes affecting INR     Dosing Instructions: Continue your current warfarin dose with next INR in 5 weeks       Summary  As of 11/9/2022    Full warfarin instructions:  5 mg every day; Starting 11/9/2022   Next INR check:  12/9/2022             Detailed voice message left for Silvia with dosing instructions and follow up date.     Patient has already scheduled an appt for 12/9    Education provided:     Please call back if any changes to your diet, medications or how you've been taking warfarin    Goal range and lab monitoring: goal range and significance of current result    Plan made per [facility] anticoagulation protocol    Kathy Chapin RN  Anticoagulation Clinic  11/9/2022    _______________________________________________________________________     Anticoagulation Episode Summary     Current INR goal:  2.0-3.0   TTR:  55.0 % (1 y)   Target end date:  Indefinite   Send INR reminders to:  FELIBERTO CALVO    Indications    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

## 2022-12-09 ENCOUNTER — LAB (OUTPATIENT)
Dept: LAB | Facility: CLINIC | Age: 62
End: 2022-12-09
Payer: COMMERCIAL

## 2022-12-09 ENCOUNTER — ANTICOAGULATION THERAPY VISIT (OUTPATIENT)
Dept: ANTICOAGULATION | Facility: CLINIC | Age: 62
End: 2022-12-09

## 2022-12-09 DIAGNOSIS — D68.62 LUPUS ANTICOAGULANT DISORDER (H): ICD-10-CM

## 2022-12-09 DIAGNOSIS — D73.5 SPLENIC INFARCT: ICD-10-CM

## 2022-12-09 DIAGNOSIS — D73.5 SPLENIC INFARCT: Primary | ICD-10-CM

## 2022-12-09 LAB — INR BLD: 2.7 (ref 0.9–1.1)

## 2022-12-09 PROCEDURE — 36416 COLLJ CAPILLARY BLOOD SPEC: CPT

## 2022-12-09 PROCEDURE — 85610 PROTHROMBIN TIME: CPT

## 2022-12-09 NOTE — PROGRESS NOTES
ANTICOAGULATION MANAGEMENT     Silvia Martinez 62 year old female is on warfarin with therapeutic INR result. (Goal INR 2.0-3.0)    Recent labs: (last 7 days)     12/09/22  0857   INR 2.7*       ASSESSMENT       Source(s): Chart Review and Template       Warfarin doses taken: Warfarin taken as instructed    Diet: No new diet changes identified    New illness, injury, or hospitalization: No    Medication/supplement changes: Zinc 7 day course (dates: last week) No interaction anticipated    Signs or symptoms of bleeding or clotting: No    Previous INR: Therapeutic last 2(+) visits    Additional findings: she was on vacation last week.        PLAN     Recommended plan for no diet, medication or health factor changes affecting INR     Dosing Instructions: Continue your current warfarin dose with next INR in 6 weeks       Summary  As of 12/9/2022    Full warfarin instructions:  5 mg every day; Starting 12/9/2022   Next INR check:  1/20/2023             Detailed voice message left for Silvia with dosing instructions and follow up date.     Contact [phone] to schedule and with any changes, questions or concerns.     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

## 2023-01-17 ENCOUNTER — LAB (OUTPATIENT)
Dept: LAB | Facility: CLINIC | Age: 63
End: 2023-01-17
Payer: COMMERCIAL

## 2023-01-17 ENCOUNTER — ANTICOAGULATION THERAPY VISIT (OUTPATIENT)
Dept: ANTICOAGULATION | Facility: CLINIC | Age: 63
End: 2023-01-17

## 2023-01-17 DIAGNOSIS — D68.62 LUPUS ANTICOAGULANT DISORDER (H): ICD-10-CM

## 2023-01-17 DIAGNOSIS — D73.5 SPLENIC INFARCT: Primary | ICD-10-CM

## 2023-01-17 DIAGNOSIS — D73.5 SPLENIC INFARCT: ICD-10-CM

## 2023-01-17 LAB — INR BLD: 2 (ref 0.9–1.1)

## 2023-01-17 PROCEDURE — 36416 COLLJ CAPILLARY BLOOD SPEC: CPT

## 2023-01-17 PROCEDURE — 85610 PROTHROMBIN TIME: CPT

## 2023-01-17 NOTE — PROGRESS NOTES
ANTICOAGULATION MANAGEMENT     Silvia Martinez 62 year old female is on warfarin with therapeutic INR result. (Goal INR 2.0-3.0)    Recent labs: (last 7 days)     01/17/23  0904   INR 2.0*       ASSESSMENT       Source(s): Chart Review and Template       Warfarin doses taken: Warfarin taken as instructed    Diet: No new diet changes identified    New illness, injury, or hospitalization: No    Medication/supplement changes: None noted    Signs or symptoms of bleeding or clotting: No    Previous INR: Therapeutic last 2(+) visits    Additional findings: None       PLAN     Recommended plan for no diet, medication or health factor changes affecting INR     Dosing Instructions: Continue your current warfarin dose with next INR in 6 weeks       Summary  As of 1/17/2023    Full warfarin instructions:  5 mg every day   Next INR check:  2/28/2023             Detailed voice message left for Silvia with dosing instructions and follow up date.     Contact [phone] to schedule and with any changes, questions or concerns.     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

## 2023-02-22 ENCOUNTER — ANCILLARY PROCEDURE (OUTPATIENT)
Dept: GENERAL RADIOLOGY | Facility: CLINIC | Age: 63
End: 2023-02-22
Attending: FAMILY MEDICINE
Payer: COMMERCIAL

## 2023-02-22 ENCOUNTER — ANTICOAGULATION THERAPY VISIT (OUTPATIENT)
Dept: ANTICOAGULATION | Facility: CLINIC | Age: 63
End: 2023-02-22

## 2023-02-22 ENCOUNTER — MYC MEDICAL ADVICE (OUTPATIENT)
Dept: FAMILY MEDICINE | Facility: CLINIC | Age: 63
End: 2023-02-22

## 2023-02-22 ENCOUNTER — OFFICE VISIT (OUTPATIENT)
Dept: FAMILY MEDICINE | Facility: CLINIC | Age: 63
End: 2023-02-22
Payer: COMMERCIAL

## 2023-02-22 VITALS
OXYGEN SATURATION: 98 % | HEART RATE: 76 BPM | DIASTOLIC BLOOD PRESSURE: 78 MMHG | TEMPERATURE: 98 F | BODY MASS INDEX: 32.47 KG/M2 | SYSTOLIC BLOOD PRESSURE: 118 MMHG | WEIGHT: 202 LBS | HEIGHT: 66 IN | RESPIRATION RATE: 16 BRPM

## 2023-02-22 DIAGNOSIS — J32.0 CHRONIC MAXILLARY SINUSITIS: ICD-10-CM

## 2023-02-22 DIAGNOSIS — D68.62 LUPUS ANTICOAGULANT DISORDER (H): ICD-10-CM

## 2023-02-22 DIAGNOSIS — G89.29 CHRONIC NECK PAIN: Primary | ICD-10-CM

## 2023-02-22 DIAGNOSIS — D73.5 SPLENIC INFARCT: Primary | ICD-10-CM

## 2023-02-22 DIAGNOSIS — M54.2 CHRONIC NECK PAIN: Primary | ICD-10-CM

## 2023-02-22 DIAGNOSIS — I77.3 ARTERIAL FIBROMUSCULAR DYSPLASIA (H): ICD-10-CM

## 2023-02-22 DIAGNOSIS — D73.5 SPLENIC INFARCT: ICD-10-CM

## 2023-02-22 DIAGNOSIS — M54.2 CHRONIC NECK PAIN: ICD-10-CM

## 2023-02-22 DIAGNOSIS — G89.29 CHRONIC NECK PAIN: ICD-10-CM

## 2023-02-22 LAB — INR BLD: 2.4 (ref 0.9–1.1)

## 2023-02-22 PROCEDURE — 99214 OFFICE O/P EST MOD 30 MIN: CPT | Performed by: FAMILY MEDICINE

## 2023-02-22 PROCEDURE — 36416 COLLJ CAPILLARY BLOOD SPEC: CPT | Performed by: FAMILY MEDICINE

## 2023-02-22 PROCEDURE — 85610 PROTHROMBIN TIME: CPT | Performed by: FAMILY MEDICINE

## 2023-02-22 PROCEDURE — 72040 X-RAY EXAM NECK SPINE 2-3 VW: CPT | Mod: TC | Performed by: RADIOLOGY

## 2023-02-22 RX ORDER — WARFARIN SODIUM 5 MG/1
TABLET ORAL
Qty: 90 TABLET | Refills: 3 | Status: SHIPPED | OUTPATIENT
Start: 2023-02-22 | End: 2024-04-12

## 2023-02-22 NOTE — PROGRESS NOTES
ANTICOAGULATION MANAGEMENT     Silvia Martinez 62 year old female is on warfarin with therapeutic INR result. (Goal INR 2.0-3.0)    Recent labs: (last 7 days)     02/22/23  1012   INR 2.4*       ASSESSMENT       Source(s): Chart Review, Patient/Caregiver Call and Template       Warfarin doses taken: Warfarin taken as instructed    Diet: No new diet changes identified    New illness, injury, or hospitalization: Yes: chronic sinus infection.     Medication/supplement changes: Augmentin 10 day course (dates: 2/22-3/3) subsequent INRs may increased. Closer INR monitoring recommended.    Signs or symptoms of bleeding or clotting: No    Previous INR: Therapeutic last 2(+) visits    Additional findings: She has been on Augmentin in the past and does not think that it effected her INR.      She is leaving town on Saturday and will be back Tuesday. Will recheck INR when back in town.         PLAN     Recommended plan for no diet, medication or health factor changes affecting INR     Dosing Instructions: Continue your current warfarin dose with next INR in 5-7 days       Summary  As of 2/22/2023    Full warfarin instructions:  5 mg every day   Next INR check:  3/1/2023             Telephone call with Silvia who verbalizes understanding and agrees to plan and who agrees to plan and repeated back plan correctly    Lab visit scheduled    Education provided:     Please call back if any changes to your diet, medications or how you've been taking warfarin    Goal range and lab monitoring: goal range and significance of current result and Importance of therapeutic range    Plan made per [facility] anticoagulation protocol    Mechelle Cox, RN  Anticoagulation Clinic  2/22/2023    _______________________________________________________________________     Anticoagulation Episode Summary     Current INR goal:  2.0-3.0   TTR:  68.2 % (1 y)   Target end date:  Indefinite   Send INR reminders to:  FELIBERTO Huggins    Splenic  infarct (Resolved) [D73.5]  Splenic infarct [D73.5]  Lupus anticoagulant disorder (H) with hx of splenic infarct [D68.62]           Comments:           Anticoagulation Care Providers     Provider Role Specialty Phone number    Jose Ramon Bolton MD Referring [facility] [phone]    Rosa Maria Alcaraz MD Referring [facility] [phone]    Maite Mcallister MD Referring [facility] [phone]    Chris Pan MD Responsible [facility] [phone]

## 2023-02-22 NOTE — ADDENDUM NOTE
Encounter addended by: Bernice Antunez, PT on: 2/22/2023 9:58 AM   Actions taken: Episode resolved, Clinical Note Signed

## 2023-02-22 NOTE — PROGRESS NOTES
"  Assessment & Plan     Chronic neck pain  6 weeks pain; no red flag sx; no preceeding injury; will start with xrays and PT; declines need for medications  - XR Cervical Spine 2/3 Views; Future  - Physical Therapy Referral; Future    Arterial fibromuscular dysplasia (H)  Lupus anticoagulant disorder (H)  Splenic infarct history  Due for INR; followed by [facility]  - warfarin ANTICOAGULANT (COUMADIN) 5 MG tablet; [WARFARIN (COUMADIN) 5 MG TABLET] Take 1 (5 mg) by mouth daily. Adjust dose based on INR results as directed.    Chronic maxillary sinusitis  3 months sx; s/p trials of flonase, decongestant, antihistamine. Abx reasonable at this point and side effects reviewed.   - amoxicillin-clavulanate (AUGMENTIN) 875-125 MG tablet; Take 1 tablet by mouth 2 times daily for 10 days        BMI:   Estimated body mass index is 32.6 kg/m  as calculated from the following:    Height as of this encounter: 1.676 m (5' 6\").    Weight as of this encounter: 91.6 kg (202 lb).   Weight management plan: Discussed healthy diet and exercise guidelines        Return in about 2 months (around 4/22/2023) for Annual physical.    Maite Mcallister MD  [facility]    Rosalee Vega is a 62 year old, presenting for the following health issues:  Neck Pain (Audibly hears neck cracking, generally sore, feels like it goes down into shoulders- Mom has hx of spinal stenosis, going on for about 6 weeks- no injury) and Sinus Problem (X 3 months, feels like every morning sinus pressure and drainage, uses humidifier and tried various medications, headaches every morning along with sinus pressure)    Neck pain: x 6 weeks; gradual onset; started noticing cracking sound in her neck with bilateral rotation  Woke up with neck soreness a few days; tried switching pillow  Not related to movements  Has a hard time telling if her neck pain is muscle or bone related type pain. Massage sometimes helps  No " "numbness/tingling down her arms  Mom has hx of spinal stenosis    Plans to follow at [facility] for her FMD; on coumadin   Also sees them for the mammograms/imaging since they have been there      History of Present Illness       Symptom onset:  More than a month  Symptom intensity:  Mild  Symptom progression:  Staying the same  Had these symptoms before:  No  What makes it worse:  No  What makes it better:  Stretching somewhat    She eats 4 or more servings of fruits and vegetables daily.She consumes 0 sweetened beverage(s) daily.She exercises with enough effort to increase her heart rate 9 or less minutes per day.  She exercises with enough effort to increase her heart rate 3 or less days per week.   She is taking medications regularly.             Review of Systems   Constitutional, HEENT, cardiovascular, pulmonary, gi and gu systems are negative, except as otherwise noted.      Objective    /78 (BP Location: Left arm, Patient Position: Sitting, Cuff Size: Adult Large)   Pulse 76   Temp 98  F (36.7  C) (Oral)   Resp 16   Ht 1.676 m (5' 6\")   Wt 91.6 kg (202 lb)   SpO2 98%   BMI 32.60 kg/m    Body mass index is 32.6 kg/m .  Physical Exam   GENERAL: healthy, alert and no distress  HEET: TMs clear BL; no erythema of the throat; +TTP over maxillary sinuses bilaterally; swollen nasal mucosa with mild erythema and discharge  NECK: no adenopathy, no asymmetry, masses, or scars and thyroid normal to palpation  RESP: lungs clear to auscultation - no rales, rhonchi or wheezes  CV: regular rate and rhythm, normal S1 S2, no S3 or S4, no murmur, click or rub, no peripheral edema and peripheral pulses strong  ABDOMEN: soft, nontender, no hepatosplenomegaly, no masses and bowel sounds normal  MS: no gross musculoskeletal defects noted, no edema, no muscle pain or TTP over C spine. ROM is intact with neck flexion, extension and lateral rotation (slightly reduced with rotation to the left).                    "

## 2023-02-22 NOTE — PROGRESS NOTES
[facility]    Outpatient Physical Therapy Discharge Note  Patient: Silvia Martinez  : 1960    Beginning/End Dates of Reporting Period:  22 to 22    Referring Provider: Dr. Bolton    Therapy Diagnosis: chronic bilateral low back pain     Client Self Report: see anjual    Objective Measurements:  Objective Measure: RAFA  Details: 14%       Goals:  Goal Identifier HEP   Goal Description Patient will be independent with HEP and self management of symptoms   Target Date 22   Date Met      Progress (detail required for progress note):       Goal Identifier Sitting   Goal Description Patient will sit for 1 hour without pain for a meal   Target Date 22   Date Met      Progress (detail required for progress note):       Goal Identifier sleep   Goal Description Patient will sleep without interruption from pain for 1 week   Target Date 22   Date Met      Progress (detail required for progress note):       Goal Identifier     Goal Description     Target Date     Date Met      Progress (detail required for progress note):       Goal Identifier     Goal Description     Target Date     Date Met      Progress (detail required for progress note):       Goal Identifier     Goal Description     Target Date     Date Met      Progress (detail required for progress note):       Goal Identifier     Goal Description     Target Date     Date Met      Progress (detail required for progress note):       Goal Identifier     Goal Description     Target Date     Date Met      Progress (detail required for progress note):             Plan:  Discharge from therapy.    Discharge:    Reason for Discharge: Pt has not been seen in clinic in over 6 months so is discharged from caseload    Equipment Issued:     Discharge Plan: Patient to continue home program.

## 2023-03-01 ENCOUNTER — LAB (OUTPATIENT)
Dept: LAB | Facility: CLINIC | Age: 63
End: 2023-03-01
Payer: COMMERCIAL

## 2023-03-01 ENCOUNTER — ANTICOAGULATION THERAPY VISIT (OUTPATIENT)
Dept: ANTICOAGULATION | Facility: CLINIC | Age: 63
End: 2023-03-01

## 2023-03-01 DIAGNOSIS — D68.62 LUPUS ANTICOAGULANT DISORDER (H): ICD-10-CM

## 2023-03-01 DIAGNOSIS — D73.5 SPLENIC INFARCT: Primary | ICD-10-CM

## 2023-03-01 DIAGNOSIS — D73.5 SPLENIC INFARCT: ICD-10-CM

## 2023-03-01 LAB — INR BLD: 2.4 (ref 0.9–1.1)

## 2023-03-01 PROCEDURE — 85610 PROTHROMBIN TIME: CPT

## 2023-03-01 PROCEDURE — 36416 COLLJ CAPILLARY BLOOD SPEC: CPT

## 2023-03-01 NOTE — PROGRESS NOTES
ANTICOAGULATION MANAGEMENT     Silvia Martinez 62 year old female is on warfarin with therapeutic INR result. (Goal INR 2.0-3.0)    Recent labs: (last 7 days)     03/01/23  0833   INR 2.4*       ASSESSMENT     Warfarin Lab Questionnaire    Dose in Tablet or Mg 2/28/2023   TAB or MG? milligram (mg)     Pt Rptd Dose SUNDAY MONDAY TUESDAY WEDNESDAY THURSDAY FRIDAY SATURDAY 2/28/2023 5 5 5 5 5 5 5     Warfarin Lab Questionnaire 2/28/2023   Missed doses? No   Medication changes? Yes   Please list: Augmentin  She has 2-3 days left.   Abnormal bleeding? No   Shortness of breath? No   Injuries or illness since last INR? No   Changes in diet or alcohol? No   Upcoming surgery, procedure? No   Best number to call with results? [phone]       Additional findings: None       PLAN     Recommended plan for temporary change(s) affecting INR     Dosing Instructions: Continue your current warfarin dose with next INR in 6 weeks       Summary  As of 3/1/2023    Full warfarin instructions:  5 mg every day   Next INR check:  4/12/2023             Detailed voice message left for Silvia with dosing instructions and follow up date.     Contact [phone] to schedule and with any changes, questions or concerns.     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

## 2023-03-03 ENCOUNTER — HOSPITAL ENCOUNTER (OUTPATIENT)
Dept: PHYSICAL THERAPY | Facility: REHABILITATION | Age: 63
Discharge: HOME | End: 2023-03-03
Attending: FAMILY MEDICINE
Payer: COMMERCIAL

## 2023-03-03 DIAGNOSIS — G89.29 CHRONIC NECK PAIN: ICD-10-CM

## 2023-03-03 DIAGNOSIS — M54.2 CHRONIC NECK PAIN: ICD-10-CM

## 2023-03-03 PROCEDURE — 97161 PT EVAL LOW COMPLEX 20 MIN: CPT | Mod: GP | Performed by: PHYSICAL THERAPIST

## 2023-03-03 PROCEDURE — 97110 THERAPEUTIC EXERCISES: CPT | Mod: GP | Performed by: PHYSICAL THERAPIST

## 2023-03-03 NOTE — PROGRESS NOTES
Date 3/3/23   Exercise    Chin tuck and scapular retraction Hold 10 seconds X 10 reps   Cervical stretches: scalene, UT and LS Hold 30 seconds X 1-3 reps

## 2023-03-10 ENCOUNTER — HOSPITAL ENCOUNTER (OUTPATIENT)
Dept: PHYSICAL THERAPY | Facility: REHABILITATION | Age: 63
Discharge: HOME | End: 2023-03-10
Attending: FAMILY MEDICINE
Payer: COMMERCIAL

## 2023-03-10 DIAGNOSIS — M54.2 CHRONIC NECK PAIN: ICD-10-CM

## 2023-03-10 DIAGNOSIS — M54.50 CHRONIC BILATERAL LOW BACK PAIN WITHOUT SCIATICA: ICD-10-CM

## 2023-03-10 DIAGNOSIS — G89.29 CHRONIC BILATERAL LOW BACK PAIN WITHOUT SCIATICA: ICD-10-CM

## 2023-03-10 DIAGNOSIS — M62.81 GENERALIZED MUSCLE WEAKNESS: Primary | ICD-10-CM

## 2023-03-10 DIAGNOSIS — G89.29 CHRONIC NECK PAIN: ICD-10-CM

## 2023-03-10 PROCEDURE — 97110 THERAPEUTIC EXERCISES: CPT | Mod: GP | Performed by: PHYSICAL THERAPY ASSISTANT

## 2023-03-10 NOTE — PROGRESS NOTES
"  Date 3/10/2023 3/3/23   Exercise     Chin tuck and scapular retraction 5\" x 10 each Hold 10 seconds X 10 reps   Cervical stretches: scalene, UT and LS 30\" x 2 B each Hold 30 seconds X 1-3 reps   Cervical isometrics all planes  5\" x5    SL bow and arrow  x10 B    Reverse shoulder rolls  x10    UBE 3'                                    Jessica Morrison PTA,CLT  "

## 2023-03-15 ASSESSMENT — ENCOUNTER SYMPTOMS
CONSTIPATION: 0
BREAST MASS: 0
NERVOUS/ANXIOUS: 0
HEADACHES: 0
HEMATURIA: 0
FREQUENCY: 0
DIZZINESS: 0
MYALGIAS: 1
DYSURIA: 0
PARESTHESIAS: 0
CHILLS: 0
FEVER: 0
HEMATOCHEZIA: 0
SHORTNESS OF BREATH: 0
HEARTBURN: 0
SORE THROAT: 0
PALPITATIONS: 0
NAUSEA: 0
WEAKNESS: 0
EYE PAIN: 0
ABDOMINAL PAIN: 0
ARTHRALGIAS: 0
JOINT SWELLING: 0
COUGH: 0
DIARRHEA: 0

## 2023-03-22 ENCOUNTER — OFFICE VISIT (OUTPATIENT)
Dept: FAMILY MEDICINE | Facility: CLINIC | Age: 63
End: 2023-03-22
Payer: COMMERCIAL

## 2023-03-22 ENCOUNTER — ANTICOAGULATION THERAPY VISIT (OUTPATIENT)
Dept: ANTICOAGULATION | Facility: CLINIC | Age: 63
End: 2023-03-22

## 2023-03-22 VITALS
HEART RATE: 76 BPM | SYSTOLIC BLOOD PRESSURE: 110 MMHG | DIASTOLIC BLOOD PRESSURE: 78 MMHG | OXYGEN SATURATION: 98 % | HEIGHT: 67 IN | WEIGHT: 202.4 LBS | BODY MASS INDEX: 31.77 KG/M2 | RESPIRATION RATE: 16 BRPM

## 2023-03-22 DIAGNOSIS — D68.62 LUPUS ANTICOAGULANT DISORDER (H): ICD-10-CM

## 2023-03-22 DIAGNOSIS — E03.9 HYPOTHYROIDISM, UNSPECIFIED TYPE: ICD-10-CM

## 2023-03-22 DIAGNOSIS — I77.3 ARTERIAL FIBROMUSCULAR DYSPLASIA (H): ICD-10-CM

## 2023-03-22 DIAGNOSIS — N95.2 ATROPHIC VAGINITIS: ICD-10-CM

## 2023-03-22 DIAGNOSIS — D73.5 SPLENIC INFARCT: ICD-10-CM

## 2023-03-22 DIAGNOSIS — D73.5 SPLENIC INFARCT: Primary | ICD-10-CM

## 2023-03-22 DIAGNOSIS — Z00.00 ROUTINE GENERAL MEDICAL EXAMINATION AT A HEALTH CARE FACILITY: Primary | ICD-10-CM

## 2023-03-22 DIAGNOSIS — Z63.6 CAREGIVER BURDEN: ICD-10-CM

## 2023-03-22 LAB
DEPRECATED CALCIDIOL+CALCIFEROL SERPL-MC: 30 UG/L (ref 20–75)
INR BLD: 1.9 (ref 0.9–1.1)
TSH SERPL DL<=0.005 MIU/L-ACNC: 1.23 UIU/ML (ref 0.3–4.2)

## 2023-03-22 PROCEDURE — 84443 ASSAY THYROID STIM HORMONE: CPT | Performed by: FAMILY MEDICINE

## 2023-03-22 PROCEDURE — 36415 COLL VENOUS BLD VENIPUNCTURE: CPT | Performed by: FAMILY MEDICINE

## 2023-03-22 PROCEDURE — 85610 PROTHROMBIN TIME: CPT | Performed by: FAMILY MEDICINE

## 2023-03-22 PROCEDURE — 99396 PREV VISIT EST AGE 40-64: CPT | Performed by: FAMILY MEDICINE

## 2023-03-22 PROCEDURE — 82306 VITAMIN D 25 HYDROXY: CPT | Performed by: FAMILY MEDICINE

## 2023-03-22 PROCEDURE — 36416 COLLJ CAPILLARY BLOOD SPEC: CPT | Performed by: FAMILY MEDICINE

## 2023-03-22 RX ORDER — ESTRADIOL 0.1 MG/G
CREAM VAGINAL
Qty: 42.5 G | Refills: 3 | Status: SHIPPED | OUTPATIENT
Start: 2023-03-22 | End: 2023-03-23

## 2023-03-22 SDOH — SOCIAL STABILITY - SOCIAL INSECURITY: DEPENDENT RELATIVE NEEDING CARE AT HOME: Z63.6

## 2023-03-22 ASSESSMENT — ENCOUNTER SYMPTOMS
HEMATOCHEZIA: 0
NAUSEA: 0
PALPITATIONS: 0
DIARRHEA: 0
DIZZINESS: 0
DYSURIA: 0
NERVOUS/ANXIOUS: 0
HEADACHES: 0
HEARTBURN: 0
BREAST MASS: 0
ARTHRALGIAS: 0
EYE PAIN: 0
HEMATURIA: 0
WEAKNESS: 0
CONSTIPATION: 0
COUGH: 0
CHILLS: 0
PARESTHESIAS: 0
ABDOMINAL PAIN: 0
JOINT SWELLING: 0
SORE THROAT: 0
FEVER: 0
MYALGIAS: 1
FREQUENCY: 0
SHORTNESS OF BREATH: 0

## 2023-03-22 NOTE — PROGRESS NOTES
SUBJECTIVE:   CC: Silvia is an 62 year old who presents for preventive health visit.   Additional Questions 3/22/2023   Roomed by Kathe FAIR LPN   Patient has been advised of split billing requirements and indicates understanding: Yes  Healthy Habits:     Getting at least 3 servings of Calcium per day:  Yes    Bi-annual eye exam:  Yes    Dental care twice a year:  Yes    Sleep apnea or symptoms of sleep apnea:  None    Diet:  Regular (no restrictions)    Frequency of exercise:  1 day/week    Duration of exercise:  Less than 15 minutes    Taking medications regularly:  Yes    Medication side effects:  Not applicable    PHQ-2 Total Score: 0    Additional concerns today:  No    Chief Complaint   Patient presents with     Physical     Fasting, mammo done at [facility] 3/21 and vascular follow up, lipid, CBC, A1c, liver enzyme, potassium, sodium and glucose all done as well     Seeing [facility] vascular medicine Dr. Sexton for her conditions of:  Celiac artery dissection with splenic infarction  Fibromuscular dysplasia  Segmental arterial mediolysis  Lupus-like anticoagulant     Every other year imaging. Continues on coumadin.    Laboratory assessment reveals a normal CBC. The INR is 2.4. The chemistry group is satisfactory with the exception of an elevated ALT with known hepatosteatosis. The lipid profile reveals a total cholesterol of 195, HDL 60, triglycerides 99, and .      Had her mammogram at [facility] again recently- has always gone there due to an abnormal result decade ago so plans to keep her mammograms there.       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  Her mom has significant health needs and even has caregivers for respite cares twice daily.   Her mom's vision has started to worsen now.     Silvia's mood is good overall; sleeping well now   Strong varghese      Has vaginal estrace that she uses sparingly     Social History     Social History Narrative     to [place]; 3 boys- all  at least  Skin warm and dry. Respirations even and unlabored. In no apparent distress at this time. To waiting room via wheelchair. with 1 child; 5 total grandchildren. All three families are living locally.     Provides a lot of care for her parents.    [facility] in [place] strong varghese    Maite Mcallister MD         Today's PHQ-2 Score:   PHQ-2 ( 1999 Pf[employer]) 3/15/2023   Q1: Little interest or pleasure in doing things 0   Q2: Feeling down, depressed or hopeless 0   PHQ-2 Score 0   Q1: Little interest or pleasure in doing things Not at all   Q2: Feeling down, depressed or hopeless Not at all   PHQ-2 Score 0       Have you ever done Advance Care Planning? (For example, a Health Directive, POLST, or a discussion with a medical provider or your loved ones about your wishes): No, advance care planning information given to patient to review.  Patient plans to discuss their wishes with loved ones or provider.      Social History     Tobacco Use     Smoking status: Never     Smokeless tobacco: Never   Substance Use Topics     Alcohol use: No     Alcohol Use 3/15/2023   Prescreen: >3 drinks/day or >7 drinks/week? No   No flowsheet data found.  Reviewed orders with patient.  Reviewed health maintenance and updated orders accordingly -       Breast Cancer Screening:    FHS-7:   Breast CA Risk Assessment (FHS-7) 3/15/2023   Did any of your first-degree relatives have breast or ovarian cancer? No   Did any of your relatives have bilateral breast cancer? No   Did any man in your family have breast cancer? No   Did any woman in your family have breast and ovarian cancer? Yes   Did any woman in your family have breast cancer before age 50 y? No   Do you have 2 or more relatives with breast and/or ovarian cancer? No   Do you have 2 or more relatives with breast and/or bowel cancer? No       Pertinent mammograms are reviewed under the imaging tab.    History of abnormal Pap smear: NO - age 30-65 PAP every 5 years with negative HPV co-testing recommended     Reviewed and updated as needed this visit by clinical staff   Tobacco  Allergies            "    Reviewed and updated as needed this visit by Provider                     Review of Systems   Constitutional: Negative for chills and fever.   HENT: Negative for congestion, ear pain, hearing loss and sore throat.    Eyes: Negative for pain and visual disturbance.   Respiratory: Negative for cough and shortness of breath.    Cardiovascular: Negative for chest pain, palpitations and peripheral edema.   Gastrointestinal: Negative for abdominal pain, constipation, diarrhea, heartburn, hematochezia and nausea.   Breasts:  Negative for tenderness, breast mass and discharge.   Genitourinary: Negative for dysuria, frequency, genital sores, hematuria, pelvic pain, urgency, vaginal bleeding and vaginal discharge.   Musculoskeletal: Positive for myalgias. Negative for arthralgias and joint swelling.   Skin: Negative for rash.   Neurological: Negative for dizziness, weakness, headaches and paresthesias.   Psychiatric/Behavioral: Negative for mood changes. The patient is not nervous/anxious.         OBJECTIVE:   /78 (BP Location: Left arm, Patient Position: Sitting, Cuff Size: Adult Large)   Pulse 76   Resp 16   Ht 1.689 m (5' 6.5\")   Wt 91.8 kg (202 lb 6.4 oz)   SpO2 98%   BMI 32.18 kg/m    Physical Exam  GENERAL: healthy, alert and no distress  EYES: Eyes grossly normal to inspection, PERRL and conjunctivae and sclerae normal  HENT: ear canals and TM's normal, nose and mouth without ulcers or lesions  NECK: no adenopathy, no asymmetry, masses, or scars and thyroid normal to palpation  RESP: lungs clear to auscultation - no rales, rhonchi or wheezes  BREAST: declines  CV: regular rate and rhythm, normal S1 S2, no S3 or S4, no murmur, click or rub, no peripheral edema and peripheral pulses strong  ABDOMEN: soft, nontender, no hepatosplenomegaly, no masses and bowel sounds normal  MS: no gross musculoskeletal defects noted, no edema  SKIN: no suspicious lesions or rashes  NEURO: Normal strength and tone, " mentation intact and speech normal  PSYCH: mentation appears normal, affect normal/bright    Diagnostic Test Results:  Labs reviewed in Epic    ASSESSMENT/PLAN:   Silvia was seen today for physical.    Diagnoses and all orders for this visit:    Routine general medical examination at a health care facility  Had routine screening labs at [facility] yesterday and will just complete testing today with hypothyroidism monitoring and VitD per pt request  -     TSH  -     Vitamin D deficiency screening    Hypothyroidism, unspecified type  Stable, TSH due today; continue levothyroxine 100mcg daily for now pending results  -     TSH    Atrophic vaginitis  Stable, refilled  -     estradiol (ESTRACE) 0.1 MG/GM vaginal cream; [ESTRADIOL (ESTRACE) 0.01 % (0.1 MG/GRAM) VAGINAL CREAM]    Arterial fibromuscular dysplasia (H)  Lupus anticoagulant disorder (H)  Splenic infarct  Doing well, Due for INR; followed by [facility]- reviewed 3/21/23 notes in detail.   - warfarin ANTICOAGULANT (COUMADIN) 5 MG tablet; [WARFARIN (COUMADIN) 5 MG TABLET] Take 1 (5 mg) by mouth daily. Adjust dose based on INR results as directed.  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 She has never used smokeless tobacco.      Maite Mcallister MD  [facility]

## 2023-03-22 NOTE — PROGRESS NOTES
ANTICOAGULATION MANAGEMENT     Silvia Patelmichelle 62 year old female is on warfarin with subtherapeutic INR result. (Goal INR 2.0-3.0)    Recent labs: (last 7 days)     03/22/23  1013   INR 1.9*       ASSESSMENT       Source(s): Chart Review and Patient/Caregiver Call       Warfarin doses taken: Warfarin taken as instructed    Diet: No new diet changes identified    New illness, injury, or hospitalization: No    Medication/supplement changes: None noted    Signs or symptoms of bleeding or clotting: No    Previous INR: Therapeutic last 2(+) visits    Additional findings: None         PLAN     Recommended plan for no diet, medication or health factor changes affecting INR     Dosing Instructions: Continue your current warfarin dose with next INR in 2 weeks       Summary  As of 3/22/2023    Full warfarin instructions:  5 mg every day   Next INR check:  4/5/2023             Telephone call with Silvia who verbalizes understanding and agrees to plan    Lab visit scheduled    Education provided:     Please call back if any changes to your diet, medications or how you've been taking warfarin    Goal range and lab monitoring: goal range and significance of current result and Importance of therapeutic range    Plan made per [facility] anticoagulation protocol    Mechelle Cox RN  Anticoagulation Clinic  3/22/2023    _______________________________________________________________________     Anticoagulation Episode Summary     Current INR goal:  2.0-3.0   TTR:  68.1 % (1 y)   Target end date:  Indefinite   Send INR reminders to:  FELIBERTO CALVO    Indications    Splenic infarct (Resolved) [D73.5]  Splenic infarct [D73.5]  Lupus anticoagulant disorder (H) with hx of splenic infarct [D68.62]           Comments:           Anticoagulation Care Providers     Provider Role Specialty Phone number    Jose Ramon Bolton MD Referring Family Medicine [phone]    Rosa Maria Alcaraz MD Referring Family Medicine [phone]    Maite Mcallister  MD [facility] [phone]

## 2023-03-23 RX ORDER — ESTRADIOL 0.1 MG/G
CREAM VAGINAL
Qty: 42.5 G | Refills: 3 | Status: SHIPPED | OUTPATIENT
Start: 2023-03-23 | End: 2023-03-23 | Stop reason: DRUGHIGH

## 2023-03-23 RX ORDER — ESTRADIOL 0.1 MG/G
2 CREAM VAGINAL
Qty: 42.5 G | Refills: 4 | Status: SHIPPED | OUTPATIENT
Start: 2023-03-23 | End: 2024-04-17

## 2023-03-23 NOTE — TELEPHONE ENCOUNTER
"Routing refill request to provider for review/approval because:  Please see note from pharmacy needing clarification    Last Written Prescription Date:  3/22/2023  Last Fill Quantity: 42.5g,  # refills: 3   Last office visit provider:  3/21/2023     Requested Prescriptions   Pending Prescriptions Disp Refills     estradiol (ESTRACE) 0.1 MG/GM vaginal cream [Pharmacy Med Name: ESTRADIOL 0.01% CREAM] 42.5 g 3     Sig: [ESTRADIOL (ESTRACE) 0.01 % (0.1 MG/GRAM) VAGINAL CREAM]       Hormone Replacement Therapy Failed - 3/22/2023 10:27 AM        Failed - Patient has mammogram in past 2 years on file if age 50-75        Passed - Blood pressure under 140/90 in past 12 months     BP Readings from Last 3 Encounters:   03/22/23 110/78   02/22/23 118/78   10/26/22 106/82                 Passed - Recent (12 mo) or future (30 days) visit within the authorizing provider's specialty     Patient has had an office visit with the authorizing provider or a provider within the authorizing providers department within the previous 12 mos or has a future within next 30 days. See \"Patient Info\" tab in inbasket, or \"Choose Columns\" in Meds & Orders section of the refill encounter.              Passed - Medication is active on med list        Passed - Patient is 18 years of age or older        Passed - No active pregnancy on record        Passed - No positive pregnancy test on record in past 12 months             Maite Squires RN 03/23/23 12:02 AM  "

## 2023-03-27 ENCOUNTER — NURSE TRIAGE (OUTPATIENT)
Dept: NURSING | Facility: CLINIC | Age: 63
End: 2023-03-27
Payer: COMMERCIAL

## 2023-03-27 NOTE — TELEPHONE ENCOUNTER
This writer spoke with patient and advised of PCP recommendations. Patient transferred to INR clinic at [phone] to speak to an INR nurse regarding any additional follow up needed.

## 2023-03-27 NOTE — TELEPHONE ENCOUNTER
Agree with plan for home cares and would typically continue the coumadin - may require more close INR follow up as diet would be affected. She can reach out to her INR team for that as well.

## 2023-03-27 NOTE — TELEPHONE ENCOUNTER
Spoke with Silvia. She has had the stomach flu with vomiting and diarrhea past couple days. Discussed to continue her current warfarin dose. Her INR was 1.9 at last check. She is starting to feel better. We scheduled INR for Friday 3/31. She will be going out of town on Sunday 4/2. Want to make sure her INR therapeutic or adjust as needed prior to leaving for the week. She understands and agrees with this plan.

## 2023-03-27 NOTE — TELEPHONE ENCOUNTER
Medication Question  (per nurse from Dr. Mcallister's office       What medication are you calling about (include dose and sig)?: Coumadin dosing instructions needed. Patient has the stomach flu, and wants to know if a different dose is needed. She was triaged by the FNA and sent to the antico line for an INR nurse to call her back to answer her questions regarding her coumadin.     Preferred Pharmacy:   [id number] - [place], [place] - [address] AT [facility]. [address]  [address] [place] [id number]  Phone: [phone] Fax: [phone]      Controlled Substance Agreement on file:   CSA -- Patient Level:    CSA: None found at the patient level.       Who prescribed the medication?: INR clinic     Do you need a refill? Just has questions regarding dosing and having stomach flu.      When did you use the medication last? Last night on 3/26    Patient offered an appointment? {N/A  Do you have any questions or concerns? Yes    Could we send this information to you in [employer] or would you prefer to receive a phone call?:   Call Silvia Martinez Back at [phone]. Thank you.

## 2023-03-27 NOTE — TELEPHONE ENCOUNTER
Pt is phoning stating that she takes Coumadin     Pt started having stomach flu on early in the morning Sunday 03/26/2023    Pt has been vomiting with diarrhea    Pt has not noted any blood or coffee grounds in vomit     Pt is currently not vomiting     Pt is having some diarrhea at times - no blood or dark, tarry stools     Pt is not eating solids yet, but is taking in fluids and is urinating     Pt is wanting to know if she should have any concerns being that she is on coumadin     Per disposition: Home Care    Care advice given per protocol and when to call back. Pt verbalized understanding and agrees to plan of care.    Mechelle Mckoy RN  [facility] Nurse Advisor  2:18 PM 3/27/2023        Reason for Disposition    MILD-MODERATE diarrhea (e.g., 1-6 times / day more than normal)    Additional Information    Negative: Shock suspected (e.g., cold/pale/clammy skin, too weak to stand, low BP, rapid pulse)    Negative: Difficult to awaken or acting confused (e.g., disoriented, slurred speech)    Negative: Sounds like a life-threatening emergency to the triager    Negative: Vomiting also present and worse than the diarrhea    Negative: Blood in stool and without diarrhea    Negative: SEVERE abdominal pain (e.g., excruciating) and present > 1 hour    Negative: SEVERE abdominal pain and age > 60 years    Negative: Bloody, black, or tarry bowel movements (Exception: chronic-unchanged black-grey bowel movements and is taking iron pills or Pepto-Bismol)    Negative: SEVERE diarrhea (e.g., 7 or more times / day more than normal) and age > 60 years    Negative: Constant abdominal pain lasting > 2 hours    Negative: Drinking very little and has signs of dehydration (e.g., no urine > 12 hours, very dry mouth, very lightheaded)    Negative: Patient sounds very sick or weak to the triager    Negative: SEVERE diarrhea (e.g., 7 or more times / day more than normal) and present > 24 hours (1 day)    Negative: MODERATE diarrhea  (e.g., 4-6 times / day more than normal) and present > 48 hours (2 days)    Negative: MODERATE diarrhea (e.g., 4-6 times / day more than normal) and age > 70 years    Negative: Abdominal pain (Exception: Pain clears completely with each passage of diarrhea stool.)    Negative: Fever > 101 F (38.3 C)    Negative: Blood in the stool    Negative: Mucus or pus in stool has been present > 2 days and diarrhea is more than mild    Negative: Weak immune system (e.g., HIV positive, cancer chemo, splenectomy, organ transplant, chronic steroids)    Negative: Travel to a foreign country in past month    Negative: Recent antibiotic therapy (i.e., within last 2 months) and diarrhea present > 3 days since antibiotic was stopped    Negative: Recent hospitalization and diarrhea present > 3 days    Negative: Tube feedings (e.g., nasogastric, g-tube, j-tube)    Negative: MILD diarrhea (e.g., 1-3 or more stools than normal in past 24 hours) diarrhea without known cause and present > 7 days    Negative: Patient wants to be seen    Negative: Diarrhea is a chronic symptom (recurrent or ongoing AND lasting > 4 weeks)    Negative: SEVERE diarrhea (e.g., 7 or more times / day more than normal)    Protocols used: DIARRHEA-A-OH

## 2023-03-31 ENCOUNTER — LAB (OUTPATIENT)
Dept: LAB | Facility: CLINIC | Age: 63
End: 2023-03-31
Payer: COMMERCIAL

## 2023-03-31 ENCOUNTER — ANTICOAGULATION THERAPY VISIT (OUTPATIENT)
Dept: ANTICOAGULATION | Facility: CLINIC | Age: 63
End: 2023-03-31

## 2023-03-31 DIAGNOSIS — D68.62 LUPUS ANTICOAGULANT DISORDER (H): ICD-10-CM

## 2023-03-31 DIAGNOSIS — D73.5 SPLENIC INFARCT: ICD-10-CM

## 2023-03-31 DIAGNOSIS — D73.5 SPLENIC INFARCT: Primary | ICD-10-CM

## 2023-03-31 LAB — INR BLD: 2 (ref 0.9–1.1)

## 2023-03-31 PROCEDURE — 36416 COLLJ CAPILLARY BLOOD SPEC: CPT

## 2023-03-31 PROCEDURE — 85610 PROTHROMBIN TIME: CPT

## 2023-03-31 NOTE — PROGRESS NOTES
ANTICOAGULATION MANAGEMENT     Silvia Martinez 62 year old female is on warfarin with therapeutic INR result. (Goal INR 2.0-3.0)    Recent labs: (last 7 days)     03/31/23  0916   INR 2.0*       ASSESSMENT     Warfarin Lab Questionnaire    Dose in Tablet or Mg 3/30/2023   TAB or MG? milligram (mg)     Pt Rptd Dose KEILA MONDAY TUESDAY WEDNESDAY THURSDAY FRIDAY SATURDAY   3/30/2023 5 5 5 5 5 5 5     Warfarin Lab Questionnaire 3/30/2023   Missed doses? Yes   If yes; please list when: I think monday 3/27-she really is not sure.   Medication changes? No   Please list: -   Abnormal bleeding? No   Shortness of breath? No   Injuries or illness since last INR? Yes   If yes, please explain: Stomach flu 3/26-3/29   Changes in diet or alcohol? Yes- did not have her wine this past week.    Please explain:  Not eating much-did not eat her greens that usually balanced her wine   Upcoming surgery, procedure? No   Best number to call with results? [phone]       Additional findings: She is going on short trip this weekend.        PLAN     Recommended plan for temporary change(s) affecting INR     Dosing Instructions: Continue your current warfarin dose with next INR in 1-2 weeks       Summary  As of 3/31/2023    Full warfarin instructions:  5 mg every day   Next INR check:  4/14/2023             Telephone call with Silvia who verbalizes understanding and agrees to plan and who agrees to plan and repeated back plan correctly    Lab visit scheduled    Education provided:     Please call back if any changes to your diet, medications or how you've been taking warfarin    Goal range and lab monitoring: goal range and significance of current result and Importance of therapeutic range    Symptom monitoring: travel related clotting risk and prevention    Plan made per [facility] anticoagulation protocol    Mechelle Cox, RN  Anticoagulation Clinic  3/31/2023    _______________________________________________________________________      Anticoagulation Episode Summary     Current INR goal:  2.0-3.0   TTR:  68.2 % (1 y)   Target end date:  Indefinite   Send INR reminders to:  FELIBERTO CALVO    Indications    Splenic infarct (Resolved) [D73.5]  Splenic infarct [D73.5]  Lupus anticoagulant disorder (H) with hx of splenic infarct [D68.62]           Comments:           Anticoagulation Care Providers     Provider Role Specialty Phone number    Jose Ramon Bolton MD Referring Family Medicine [phone]    Rosa Maria Alcaraz MD Referring Family Medicine [phone]    Maite Mcallister MD Responsible Family Medicine [phone]

## 2023-04-12 ENCOUNTER — ANTICOAGULATION THERAPY VISIT (OUTPATIENT)
Dept: ANTICOAGULATION | Facility: CLINIC | Age: 63
End: 2023-04-12

## 2023-04-12 ENCOUNTER — LAB (OUTPATIENT)
Dept: LAB | Facility: CLINIC | Age: 63
End: 2023-04-12
Payer: COMMERCIAL

## 2023-04-12 DIAGNOSIS — D73.5 SPLENIC INFARCT: ICD-10-CM

## 2023-04-12 DIAGNOSIS — D68.62 LUPUS ANTICOAGULANT DISORDER (H): ICD-10-CM

## 2023-04-12 DIAGNOSIS — D73.5 SPLENIC INFARCT: Primary | ICD-10-CM

## 2023-04-12 LAB — INR BLD: 2.6 (ref 0.9–1.1)

## 2023-04-12 PROCEDURE — 85610 PROTHROMBIN TIME: CPT

## 2023-04-12 PROCEDURE — 36416 COLLJ CAPILLARY BLOOD SPEC: CPT

## 2023-04-12 NOTE — PROGRESS NOTES
ANTICOAGULATION MANAGEMENT     Silvia Martinez 62 year old female is on warfarin with therapeutic INR result. (Goal INR 2.0-3.0)    Recent labs: (last 7 days)     04/12/23  1136   INR 2.6*       ASSESSMENT     Warfarin Lab Questionnaire    Warfarin Doses Last 7 Days      4/12/2023    11:31 AM   Dose in Tablet or Mg   TAB or MG? milligram (mg)     Pt Rptd Dose SUNDAY MONDAY TUESDAY WED THURS FRIDAY SATURDAY 4/12/2023  11:31 AM 5 5 5 5 5 5 5         4/12/2023   Warfarin Lab Questionnaire   Missed doses? No   Changes in diet or alcohol? No   Shortness of breath? No   Injuries or illness since last INR? No   Upcoming surgery, procedure? No   Medication changes? No   Abnormal bleeding? No   Best number to call with results? [phone]        Previous INR: Therapeutic last visit; previously outside of goal range  Additional findings: None       PLAN     Recommended plan for no diet, medication or health factor changes affecting INR     Dosing Instructions: Continue your current warfarin dose with next INR in 3-4 weeks       Summary  As of 4/12/2023    Full warfarin instructions:  5 mg every day   Next INR check:  5/10/2023             Detailed voice message left for Silvia with dosing instructions and follow up date.     Contact [phone] to schedule and with any changes, questions or concerns.     Education provided:     Please call back if any changes to your diet, medications or how you've been taking warfarin    Plan made per [facility] anticoagulation protocol    Mechelle Cox, RN  Anticoagulation Clinic  4/12/2023    _______________________________________________________________________     Anticoagulation Episode Summary     Current INR goal:  2.0-3.0   TTR:  71.4 % (1 y)   Target end date:  Indefinite   Send INR reminders to:  FELIBERTO CALVO    Indications    Splenic infarct (Resolved) [D73.5]  Splenic infarct [D73.5]  Lupus anticoagulant disorder (H) with hx of splenic infarct [D68.62]           Comments:            Anticoagulation Care Providers     Provider Role Specialty Phone number    Jose Ramon Bolton MD Referring Family Medicine [phone]    Rosa Maria Alcaraz MD Referring Family Medicine [phone]    Maite Mcallister MD [facility] Family Medicine [phone]

## 2023-04-17 ENCOUNTER — TELEPHONE (OUTPATIENT)
Dept: FAMILY MEDICINE | Facility: CLINIC | Age: 63
End: 2023-04-17
Payer: COMMERCIAL

## 2023-04-17 ENCOUNTER — TRANSFERRED RECORDS (OUTPATIENT)
Dept: HEALTH INFORMATION MANAGEMENT | Facility: CLINIC | Age: 63
End: 2023-04-17
Payer: COMMERCIAL

## 2023-04-17 NOTE — TELEPHONE ENCOUNTER
FYI - Status Update    Who is Calling: patient    Update: Pt recently received steroid injection and wants to know whether or not that will affect her INR result    Does caller want a call/response back: Yes     Could we send this information to you in ReplySend or would you prefer to receive a phone call?:   Patient would like to be contacted via ReplySend

## 2023-04-19 NOTE — PROGRESS NOTES
"[facility]    Outpatient Physical Therapy Discharge Note  Patient: Silvia Martinez  : 1960    Beginning/End Dates of Reporting Period:  3/3/23 to 3/10/23    Referring Provider: Dr. Mcallister    Therapy Diagnosis: Cervical Pain     Client Self Report: Pt reports her exercises goiong well. Pt reports the first few days doing the stretches she was sore. \" Better now.\"    Objective Measurements:  Objective Measure: NDI: 12 on 3/3/23     Objective Measure: cervical ROM  Details: flexion: WNL \"stretching\" extension: WNL ,SB: L mod loss, R minimal loss, rotation: R  72 degrees  L 55 degrees        Goals:  Goal Identifier reading   Goal Description Pt will be able to look down to read increasing time to 45+ minutes with decrease reported pain levels   Target Date 23   Date Met      Progress (detail required for progress note): tolerates 20-25 minutes without pain     Goal Identifier endurance of neck muslces   Goal Description Pt will be able to have less fatigue in neck and feel she doesn't have to support her neck as muscle strength improves and NDI improves by 6   Target Date 23   Date Met      Progress (detail required for progress note): progressing towards     Goal Identifier motion   Goal Description Pt will demonstrate improved neck motion with less reported pain levels to perform activities such as looking down or turning   Target Date 23   Date Met      Progress (detail required for progress note): progressing towards     Goal Identifier     Goal Description     Target Date     Date Met      Progress (detail required for progress note):       Goal Identifier     Goal Description     Target Date     Date Met      Progress (detail required for progress note):       Goal Identifier     Goal Description     Target Date     Date Met      Progress (detail required for progress note):       Goal " Identifier     Goal Description     Target Date     Date Met      Progress (detail required for progress note):       Goal Identifier     Goal Description     Target Date     Date Met      Progress (detail required for progress note):             Plan:  Discharge from therapy.    Discharge:    Reason for Discharge: attendance.     Equipment Issued:     Discharge Plan: Patient to continue home program.

## 2023-04-19 NOTE — ADDENDUM NOTE
Encounter addended by: Bernice Antunez, PT on: 4/19/2023 1:56 PM   Actions taken: Episode resolved, Clinical Note Signed

## 2023-05-02 ENCOUNTER — TELEPHONE (OUTPATIENT)
Dept: FAMILY MEDICINE | Facility: CLINIC | Age: 63
End: 2023-05-02
Payer: COMMERCIAL

## 2023-05-02 NOTE — TELEPHONE ENCOUNTER
Spoke with Silvia and she is going to start Collagen peptide powder daily. Discussed information found of possible interaction of both increased risk of bleeding and risk of decreasing INR. She has INR scheduled for about 2 weeks from now and will start supplement and see if effects INR results. Discussed to monitor for any symptoms of bleeding or clots and be seen sooner if issues.

## 2023-05-02 NOTE — TELEPHONE ENCOUNTER
Patient Returning Call    Reason for call:  Question about a supplement:  Information relayed to patient:  Someone will return her call by phone     Patient has additional questions:  No    What are your questions/concerns:   Will Collagen Peptide affect her INR?     Could we send this information to you in [place] or would you prefer to receive a phone call?:   Patient would prefer a phone call   Okay to leave a detailed message?: Yes at Home number on file [phone] (home)

## 2023-05-17 ENCOUNTER — LAB (OUTPATIENT)
Dept: LAB | Facility: CLINIC | Age: 63
End: 2023-05-17
Payer: COMMERCIAL

## 2023-05-17 ENCOUNTER — ANTICOAGULATION THERAPY VISIT (OUTPATIENT)
Dept: ANTICOAGULATION | Facility: CLINIC | Age: 63
End: 2023-05-17

## 2023-05-17 DIAGNOSIS — D68.62 LUPUS ANTICOAGULANT DISORDER (H): ICD-10-CM

## 2023-05-17 DIAGNOSIS — D73.5 SPLENIC INFARCT: ICD-10-CM

## 2023-05-17 DIAGNOSIS — D73.5 SPLENIC INFARCT: Primary | ICD-10-CM

## 2023-05-17 LAB — INR BLD: 2.1 (ref 0.9–1.1)

## 2023-05-17 PROCEDURE — 36416 COLLJ CAPILLARY BLOOD SPEC: CPT

## 2023-05-17 PROCEDURE — 85610 PROTHROMBIN TIME: CPT

## 2023-05-17 NOTE — PROGRESS NOTES
ANTICOAGULATION MANAGEMENT     Silvia Martinez 63 year old female is on warfarin with therapeutic INR result. (Goal INR 2.0-3.0)    Recent labs: (last 7 days)     05/17/23  1305   INR 2.1*       ASSESSMENT     Warfarin Lab Questionnaire    Warfarin Doses Last 7 Days      5/16/2023     1:47 PM   Dose in Tablet or Mg   TAB or MG? milligram (mg)     Pt Rptd Dose SUNDAY MONDAY TUESDAY WED THURS FRIDAY SATURDAY 5/16/2023   1:47 PM 5 5 5 5 5 5 5         5/16/2023   Warfarin Lab Questionnaire   Missed doses within past 14 days? No   Changes in diet or alcohol within past 14 days? No   Medication changes since last result? Yes   Please list: Collagen   Injuries or illness since last result? No   New shortness of breath, severe headaches or sudden changes in vision since last result? No   Abnormal bleeding since last result? No   Upcoming surgery, procedure? No   Best number to call with results? [phone]        Previous result: Therapeutic last 2(+) visits  Additional findings: None       PLAN     Recommended plan for ongoing change(s) affecting INR     Dosing Instructions: Continue your current warfarin dose with next INR in 3-4 weeks       Summary  As of 5/17/2023    Full warfarin instructions:  5 mg every day   Next INR check:  6/14/2023             Detailed voice message left for Silvia with dosing instructions and follow up date.     Contact [phone] to schedule and with any changes, questions or concerns.     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

## 2023-06-22 ENCOUNTER — LAB (OUTPATIENT)
Dept: LAB | Facility: CLINIC | Age: 63
End: 2023-06-22
Payer: COMMERCIAL

## 2023-06-22 ENCOUNTER — ANTICOAGULATION THERAPY VISIT (OUTPATIENT)
Dept: ANTICOAGULATION | Facility: CLINIC | Age: 63
End: 2023-06-22

## 2023-06-22 DIAGNOSIS — D68.62 LUPUS ANTICOAGULANT DISORDER (H): ICD-10-CM

## 2023-06-22 DIAGNOSIS — D73.5 SPLENIC INFARCT: Primary | ICD-10-CM

## 2023-06-22 DIAGNOSIS — D73.5 SPLENIC INFARCT: ICD-10-CM

## 2023-06-22 LAB — INR BLD: 2 (ref 0.9–1.1)

## 2023-06-22 PROCEDURE — 36416 COLLJ CAPILLARY BLOOD SPEC: CPT

## 2023-06-22 PROCEDURE — 85610 PROTHROMBIN TIME: CPT

## 2023-06-22 NOTE — PROGRESS NOTES
ANTICOAGULATION MANAGEMENT     Silvia DESTIN Juan 63 year old female is on warfarin with therapeutic INR result. (Goal INR 2.0-3.0)    Recent labs: (last 7 days)     06/22/23  0807   INR 2.0*       ASSESSMENT     Warfarin Lab Questionnaire    Warfarin Doses Last 7 Days      6/22/2023     7:48 AM   Dose in Tablet or Mg   TAB or MG? milligram (mg)     Pt Rptd Dose SUNDAY MONDAY TUESDAY WED THURS FRIDAY SATURDAY 6/22/2023   7:48 AM 5 5 5 5 5 5 5         6/22/2023   Warfarin Lab Questionnaire   Missed doses within past 14 days? No   Changes in diet or alcohol within past 14 days? No   Medication changes since last result? No   Injuries or illness since last result? No   New shortness of breath, severe headaches or sudden changes in vision since last result? No   Abnormal bleeding since last result? No   Upcoming surgery, procedure? No   Best number to call with results? [phone]     Previous result: Therapeutic last 2(+) visits  Additional findings: she will recheck INR in 4 weeks since they will be going on an [place] after that.        PLAN     Recommended plan for no diet, medication or health factor changes affecting INR     Dosing Instructions: Continue your current warfarin dose with next INR in 6 weeks       Summary  As of 6/22/2023    Full warfarin instructions:  5 mg every day   Next INR check:  8/3/2023             Telephone call with Silvia who verbalizes understanding and agrees to plan and who agrees to plan and repeated back plan correctly    Lab visit scheduled    Education provided:     Please call back if any changes to your diet, medications or how you've been taking warfarin    Goal range and lab monitoring: goal range and significance of current result and Importance of therapeutic range    Plan made per [facility] anticoagulation protocol    Mechelle Cox, RN  Anticoagulation Clinic  6/22/2023    _______________________________________________________________________     Anticoagulation Episode  Summary     Current INR goal:  2.0-3.0   TTR:  77.6 % (1 y)   Target end date:  Indefinite   Send INR reminders to:  FELIBERTO CALVO    Indications    Splenic infarct (Resolved) [D73.5]  Splenic infarct [D73.5]  Lupus anticoagulant disorder (H) with hx of splenic infarct [D68.62]           Comments:           Anticoagulation Care Providers     Provider Role Specialty Phone number    Jose Ramon Bolton MD Referring [facility] Medicine [phone]    Rosa Maria Alcaraz MD Referring Family Medicine [phone]    Maite Mcallister MD Responsible [facility] [phone]

## 2023-07-07 ENCOUNTER — TRANSFERRED RECORDS (OUTPATIENT)
Dept: HEALTH INFORMATION MANAGEMENT | Facility: CLINIC | Age: 63
End: 2023-07-07
Payer: COMMERCIAL

## 2023-07-10 ENCOUNTER — TRANSFERRED RECORDS (OUTPATIENT)
Dept: HEALTH INFORMATION MANAGEMENT | Facility: CLINIC | Age: 63
End: 2023-07-10
Payer: COMMERCIAL

## 2023-07-13 ENCOUNTER — NURSE TRIAGE (OUTPATIENT)
Dept: NURSING | Facility: CLINIC | Age: 63
End: 2023-07-13
Payer: COMMERCIAL

## 2023-07-14 ENCOUNTER — TELEPHONE (OUTPATIENT)
Dept: FAMILY MEDICINE | Facility: CLINIC | Age: 63
End: 2023-07-14
Payer: COMMERCIAL

## 2023-07-14 DIAGNOSIS — Z23 NEED FOR TDAP VACCINATION: Primary | ICD-10-CM

## 2023-07-14 NOTE — TELEPHONE ENCOUNTER
Patient is on MA schedule 7/19 for Tdap vaccine. Her daughter is having a baby and would like for her to have the vaccine before seeing baby.   Please advise. Order pended.  Kathe Duncan LPN

## 2023-07-14 NOTE — TELEPHONE ENCOUNTER
Nurse Triage SBAR    Is this a 2nd Level Triage? NO    Situation: Pt called wondering if 2 medications will affect her INR.    Background: Pt states she is going on a cruise soon and is needing to take some new medications.    Assessment: Pt wonders if Bonine and Dramamine will affect her INR.     Recommendation: Please review and contact pt via [place] to discuss the above mentioned.     Reason for Disposition   [1] Caller has NON-URGENT medicine question about med that PCP prescribed AND [2] triager unable to answer question    Additional Information   Negative: [1] Intentional drug overdose AND [2] suicidal thoughts or ideas   Negative: MORE THAN A DOUBLE DOSE of a prescription or over-the-counter (OTC) drug   Negative: [1] DOUBLE DOSE (an extra dose or lesser amount) of prescription drug AND [2] any symptoms (e.g., dizziness, nausea, pain, sleepiness)   Negative: [1] DOUBLE DOSE (an extra dose or lesser amount) of over-the-counter (OTC) drug AND [2] any symptoms (e.g., dizziness, nausea, pain, sleepiness)   Negative: Took another person's prescription drug   Negative: [1] DOUBLE DOSE (an extra dose or lesser amount) of prescription drug AND [2] NO symptoms (Exception: a double dose of antibiotics)   Negative: Diabetes drug error or overdose (e.g., took wrong type of insulin or took extra dose)   Negative: [1] Prescription not at pharmacy AND [2] was prescribed by PCP recently (Exception: triager has access to EMR and prescription is recorded there. Go to Home Care and confirm for pharmacy.)   Negative: [1] Pharmacy calling with prescription question AND [2] triager unable to answer question   Negative: [1] Caller has URGENT medicine question about med that PCP or specialist prescribed AND [2] triager unable to answer question   Negative: Medicine patch causing local rash or itching   Negative: [1] Caller has medicine question about med NOT prescribed by PCP AND [2] triager unable to answer question (e.g.,  compatibility with other med, storage)   Negative: Prescription request for new medicine (not a refill)    Protocols used: Medication Question Call-A-JESUS MANUEL Jett RN  [facility] Nurse Advisor   7/13/2023  7:27 PM

## 2023-07-20 DIAGNOSIS — E03.9 ACQUIRED HYPOTHYROIDISM: ICD-10-CM

## 2023-07-20 RX ORDER — LEVOTHYROXINE SODIUM 100 UG/1
TABLET ORAL
Qty: 90 TABLET | Refills: 2 | Status: SHIPPED | OUTPATIENT
Start: 2023-07-20 | End: 2024-04-17

## 2023-07-20 NOTE — TELEPHONE ENCOUNTER
"  Last Written Prescription Date:  01/03/2023  Last Fill Quantity: 90,  # refills: 2   Last office visit provider:  03/22/2023     Requested Prescriptions   Pending Prescriptions Disp Refills     levothyroxine (SYNTHROID/LEVOTHROID) 100 MCG tablet [Pharmacy Med Name: LEVOTHYROXINE 100 MCG TABLET] 90 tablet 2     Sig: TAKE 1 TABLET BY MOUTH EVERY DAY       Thyroid Protocol Passed - 7/20/2023  1:59 PM        Passed - Patient is 12 years or older        Passed - Recent (12 mo) or future (30 days) visit within the authorizing provider's specialty     Patient has had an office visit with the authorizing provider or a provider within the authorizing providers department within the previous 12 mos or has a future within next 30 days. See \"Patient Info\" tab in inbasket, or \"Choose Columns\" in Meds & Orders section of the refill encounter.              Passed - Medication is active on med list        Passed - Normal TSH on file in past 12 months     Recent Labs   Lab Test 03/22/23  1011   TSH 1.23              Passed - No active pregnancy on record     If patient is pregnant or has had a positive pregnancy test, please check TSH.          Passed - No positive pregnancy test in past 12 months     If patient is pregnant or has had a positive pregnancy test, please check TSH.               Madhavi Bowers RN 07/20/23 1:59 PM  "

## 2023-07-24 ENCOUNTER — ANTICOAGULATION THERAPY VISIT (OUTPATIENT)
Dept: ANTICOAGULATION | Facility: CLINIC | Age: 63
End: 2023-07-24

## 2023-07-24 ENCOUNTER — ALLIED HEALTH/NURSE VISIT (OUTPATIENT)
Dept: FAMILY MEDICINE | Facility: CLINIC | Age: 63
End: 2023-07-24
Payer: COMMERCIAL

## 2023-07-24 ENCOUNTER — LAB (OUTPATIENT)
Dept: LAB | Facility: CLINIC | Age: 63
End: 2023-07-24
Payer: COMMERCIAL

## 2023-07-24 DIAGNOSIS — D68.62 LUPUS ANTICOAGULANT DISORDER (H): ICD-10-CM

## 2023-07-24 DIAGNOSIS — Z23 NEED FOR TDAP VACCINATION: ICD-10-CM

## 2023-07-24 DIAGNOSIS — D73.5 SPLENIC INFARCT: ICD-10-CM

## 2023-07-24 DIAGNOSIS — D73.5 SPLENIC INFARCT: Primary | ICD-10-CM

## 2023-07-24 LAB — INR BLD: 2.2 (ref 0.9–1.1)

## 2023-07-24 PROCEDURE — 99207 PR NO CHARGE NURSE ONLY: CPT

## 2023-07-24 PROCEDURE — 90471 IMMUNIZATION ADMIN: CPT

## 2023-07-24 PROCEDURE — 85610 PROTHROMBIN TIME: CPT

## 2023-07-24 PROCEDURE — 36416 COLLJ CAPILLARY BLOOD SPEC: CPT

## 2023-07-24 PROCEDURE — 90715 TDAP VACCINE 7 YRS/> IM: CPT

## 2023-07-24 NOTE — PROGRESS NOTES
ANTICOAGULATION MANAGEMENT     Silvia Martinez 63 year old female is on warfarin with therapeutic INR result. (Goal INR 2.0-3.0)    Recent labs: (last 7 days)     07/24/23  0756   INR 2.2*       ASSESSMENT     Warfarin Lab Questionnaire    Warfarin Doses Last 7 Days      7/24/2023     7:54 AM   Dose in Tablet or Mg   TAB or MG? milligram (mg)     Pt Rptd Dose SUNDAY MONDAY TUESDAY WED THURS FRIDAY SATURDAY 7/24/2023   7:54 AM 5 5 5 5 5 5 5         7/24/2023   Warfarin Lab Questionnaire   Missed doses within past 14 days? No   Changes in diet or alcohol within past 14 days? No   Medication changes since last result? No   Injuries or illness since last result? No   New shortness of breath, severe headaches or sudden changes in vision since last result? No   Abnormal bleeding since last result? No   Upcoming surgery, procedure? No   Best number to call with results? [phone]     Previous result: Therapeutic last 2(+) visits  Additional findings:  she is [place]       PLAN     Recommended plan for no diet, medication or health factor changes affecting INR     Dosing Instructions: Continue your current warfarin dose with next INR in 6 weeks   or sooner if feels with vacation.     Summary  As of 7/24/2023      Full warfarin instructions:  5 mg every day   Next INR check:  9/5/2023               Detailed voice message left for Silvia with dosing instructions and follow up date.     Contact [phone] to schedule and with any changes, questions or concerns.     Education provided:   Please call back if any changes to your diet, medications or how you've been taking warfarin    Plan made per [facility] anticoagulation protocol    Mechelle Cox, RN  Anticoagulation Clinic  7/24/2023    _______________________________________________________________________     Anticoagulation Episode Summary       Current INR goal:  2.0-3.0   TTR:  80.8 % (1 y)   Target end date:  Indefinite   Send INR reminders to:  FELIBERTO  TAMARACK    Indications    Splenic infarct (Resolved) [D73.5]  Splenic infarct [D73.5]  Lupus anticoagulant disorder (H) with hx of splenic infarct [D68.62]             Comments:               Anticoagulation Care Providers       Provider Role Specialty Phone number    Jose Ramon Bolton MD Referring Family Medicine [phone]    Rosa Maria Alcaraz MD Referring [facility] Medicine [phone]    Maite Mcallister MD Responsible [facility] [phone]

## 2023-08-11 ENCOUNTER — TRANSFERRED RECORDS (OUTPATIENT)
Dept: HEALTH INFORMATION MANAGEMENT | Facility: CLINIC | Age: 63
End: 2023-08-11
Payer: COMMERCIAL

## 2023-08-23 ENCOUNTER — LAB (OUTPATIENT)
Dept: LAB | Facility: CLINIC | Age: 63
End: 2023-08-23
Payer: COMMERCIAL

## 2023-08-23 ENCOUNTER — ANTICOAGULATION THERAPY VISIT (OUTPATIENT)
Dept: ANTICOAGULATION | Facility: CLINIC | Age: 63
End: 2023-08-23

## 2023-08-23 DIAGNOSIS — D68.62 LUPUS ANTICOAGULANT DISORDER (H): ICD-10-CM

## 2023-08-23 DIAGNOSIS — D73.5 SPLENIC INFARCT: ICD-10-CM

## 2023-08-23 DIAGNOSIS — D73.5 SPLENIC INFARCT: Primary | ICD-10-CM

## 2023-08-23 LAB — INR BLD: 1.4 (ref 0.9–1.1)

## 2023-08-23 PROCEDURE — 36416 COLLJ CAPILLARY BLOOD SPEC: CPT

## 2023-08-23 PROCEDURE — 85610 PROTHROMBIN TIME: CPT

## 2023-08-23 NOTE — PROGRESS NOTES
ANTICOAGULATION MANAGEMENT     Silvia Martinez 63 year old female is on warfarin with subtherapeutic INR result. (Goal INR 2.0-3.0)    Recent labs: (last 7 days)     08/23/23  1031   INR 1.4*       ASSESSMENT     Warfarin Lab Questionnaire    Warfarin Doses Last 7 Days      8/22/2023    10:54 AM   Dose in Tablet or Mg   TAB or MG? milligram (mg)     Pt Rptd Dose SUNDAY MONDAY TUESDAY WED THURS FRIDAY SATURDAY 8/22/2023  10:54 AM 5 5 5 5 5 5 5         8/22/2023   Warfarin Lab Questionnaire   Missed doses within past 14 days? No-she thinks she may have missed last Thursday as they were going to the cabin.    Changes in diet or alcohol within past 14 days? No   Medication changes since last result? Yes   Please list: Nyquil for a few nights   Injuries or illness since last result? Yes   If yes, please explain: Slight cold/cough-resolved   New shortness of breath, severe headaches or sudden changes in vision since last result? No   Abnormal bleeding since last result? No   Upcoming surgery, procedure? No   Best number to call with results? [phone]     Previous result: Therapeutic last 2(+) visits  Additional findings: None       PLAN     Recommended plan for temporary change(s) affecting INR     Dosing Instructions: booster dose then continue your current warfarin dose with next INR in 5-7 days       Summary  As of 8/23/2023      Full warfarin instructions:  8/23: 10 mg; Otherwise 5 mg every day   Next INR check:  8/30/2023               Telephone call with Silvia who verbalizes understanding and agrees to plan and who agrees to plan and repeated back plan correctly    Lab visit scheduled    Education provided:   Please call back if any changes to your diet, medications or how you've been taking warfarin  Goal range and lab monitoring: goal range and significance of current result and Importance of therapeutic range    Plan made per [facility] anticoagulation protocol    Mechelle Cox, LUIZA  Anticoagulation  Clinic  8/23/2023    _______________________________________________________________________     Anticoagulation Episode Summary       Current INR goal:  2.0-3.0   TTR:  81.4 % (1 y)   Target end date:  Indefinite   Send INR reminders to:  [facility]    Indications    Splenic infarct (Resolved) [D73.5]  Splenic infarct [D73.5]  Lupus anticoagulant disorder (H) with hx of splenic infarct [D68.62]             Comments:               Anticoagulation Care Providers       Provider Role Specialty Phone number    Jose Ramon Bolton MD Referring Family Medicine [phone]    Rosa Maria Alcaraz MD Referring Family Medicine [phone]    Maite Mcallister MD Responsible Family Medicine [phone]

## 2023-08-29 ENCOUNTER — LAB (OUTPATIENT)
Dept: LAB | Facility: CLINIC | Age: 63
End: 2023-08-29
Payer: COMMERCIAL

## 2023-08-29 ENCOUNTER — ANTICOAGULATION THERAPY VISIT (OUTPATIENT)
Dept: ANTICOAGULATION | Facility: CLINIC | Age: 63
End: 2023-08-29

## 2023-08-29 DIAGNOSIS — D68.62 LUPUS ANTICOAGULANT DISORDER (H): ICD-10-CM

## 2023-08-29 DIAGNOSIS — D73.5 SPLENIC INFARCT: Primary | ICD-10-CM

## 2023-08-29 DIAGNOSIS — D73.5 SPLENIC INFARCT: ICD-10-CM

## 2023-08-29 LAB — INR BLD: 2.4 (ref 0.9–1.1)

## 2023-08-29 PROCEDURE — 36416 COLLJ CAPILLARY BLOOD SPEC: CPT

## 2023-08-29 PROCEDURE — 85610 PROTHROMBIN TIME: CPT

## 2023-08-29 NOTE — PROGRESS NOTES
ANTICOAGULATION MANAGEMENT     Silvia Martinez 63 year old female is on warfarin with therapeutic INR result. (Goal INR 2.0-3.0)    Recent labs: (last 7 days)     08/29/23  0939   INR 2.4*       ASSESSMENT     Warfarin Lab Questionnaire    Warfarin Doses Last 7 Days      8/28/2023    11:38 AM   Dose in Tablet or Mg   TAB or MG? milligram (mg)     Pt Rptd Dose SUNDAY MONDAY TUESDAY WED THURS FRIDAY SATURDAY 8/28/2023  11:38 AM 5 5 5 10 5 5 5         8/28/2023   Warfarin Lab Questionnaire   Missed doses within past 14 days? No   Changes in diet or alcohol within past 14 days? No   Medication changes since last result? No   Injuries or illness since last result? No   New shortness of breath, severe headaches or sudden changes in vision since last result? No   Abnormal bleeding since last result? No   Upcoming surgery, procedure? No   Best number to call with results? [phone]     Previous result: Subtherapeutic  Additional findings: None       PLAN     Recommended plan for temporary change(s) affecting INR     Dosing Instructions: Continue your current warfarin dose with next INR in 2 weeks       Summary  As of 8/29/2023      Full warfarin instructions:  5 mg every day   Next INR check:  9/12/2023               Telephone call with Silvia who verbalizes understanding and agrees to plan and who agrees to plan and repeated back plan correctly    Lab visit scheduled    Education provided:   Please call back if any changes to your diet, medications or how you've been taking warfarin  Goal range and lab monitoring: goal range and significance of current result and Importance of therapeutic range    Plan made per [facility] anticoagulation protocol    Mechelle Cox, RN  Anticoagulation Clinic  8/29/2023    _______________________________________________________________________     Anticoagulation Episode Summary       Current INR goal:  2.0-3.0   TTR:  82.1 % (1 y)   Target end date:  Indefinite   Send INR reminders to:  FELIBERTO  TAMARACK    Indications    Splenic infarct (Resolved) [D73.5]  Splenic infarct [D73.5]  Lupus anticoagulant disorder (H) with hx of splenic infarct [D68.62]             Comments:               Anticoagulation Care Providers       Provider Role Specialty Phone number    Jose Ramon Bolton MD Referring Family Medicine [phone]    Rosa Maria Alcaraz MD Referring [facility] Medicine [phone]    Maite Mcallister MD Responsible [facility] [phone]

## 2023-09-11 ENCOUNTER — TRANSFERRED RECORDS (OUTPATIENT)
Dept: HEALTH INFORMATION MANAGEMENT | Facility: CLINIC | Age: 63
End: 2023-09-11
Payer: COMMERCIAL

## 2023-09-13 ENCOUNTER — ANTICOAGULATION THERAPY VISIT (OUTPATIENT)
Dept: ANTICOAGULATION | Facility: CLINIC | Age: 63
End: 2023-09-13

## 2023-09-13 ENCOUNTER — LAB (OUTPATIENT)
Dept: LAB | Facility: CLINIC | Age: 63
End: 2023-09-13
Payer: COMMERCIAL

## 2023-09-13 DIAGNOSIS — D73.5 SPLENIC INFARCT: ICD-10-CM

## 2023-09-13 DIAGNOSIS — D68.62 LUPUS ANTICOAGULANT DISORDER (H): ICD-10-CM

## 2023-09-13 DIAGNOSIS — D73.5 SPLENIC INFARCT: Primary | ICD-10-CM

## 2023-09-13 LAB — INR BLD: 2.9 (ref 0.9–1.1)

## 2023-09-13 PROCEDURE — 85610 PROTHROMBIN TIME: CPT

## 2023-09-13 PROCEDURE — 36416 COLLJ CAPILLARY BLOOD SPEC: CPT

## 2023-09-13 NOTE — PROGRESS NOTES
ANTICOAGULATION MANAGEMENT     Silvia Martinez 63 year old female is on warfarin with therapeutic INR result. (Goal INR 2.0-3.0)    Recent labs: (last 7 days)     09/13/23  1030   INR 2.9*       ASSESSMENT     Source(s): Chart Review  Previous INR was Therapeutic last visit; previously outside of goal range  Medication, diet, health changes since last INR chart reviewed; none identified         PLAN     Recommended plan for no diet, medication or health factor changes affecting INR     Dosing Instructions: Continue your current warfarin dose with next INR in 3 weeks       Summary  As of 9/13/2023      Full warfarin instructions:  5 mg every day   Next INR check:  10/4/2023               Detailed voice message left for Silvia with dosing instructions and follow up date.     Contact [phone] to schedule and with any changes, questions or concerns.     Education provided:   Please call back if any changes to your diet, medications or how you've been taking warfarin    Plan made per [facility] anticoagulation protocol    Mechelle Cox, RN  Anticoagulation Clinic  9/13/2023    _______________________________________________________________________     Anticoagulation Episode Summary       Current INR goal:  2.0-3.0   TTR:  86.2 % (1 y)   Target end date:  Indefinite   Send INR reminders to:  FELIBERTO CALVO    Indications    Splenic infarct (Resolved) [D73.5]  Splenic infarct [D73.5]  Lupus anticoagulant disorder (H) with hx of splenic infarct [D68.62]             Comments:               Anticoagulation Care Providers       Provider Role Specialty Phone number    Jose Ramon Bolton MD Referring Family Medicine [phone]    Rosa Maria Alcaraz MD Referring Family Medicine [phone]    Maite Mcallister MD Responsible [facility] Medicine [phone]

## 2023-10-04 ENCOUNTER — LAB (OUTPATIENT)
Dept: LAB | Facility: CLINIC | Age: 63
End: 2023-10-04
Payer: COMMERCIAL

## 2023-10-04 ENCOUNTER — DOCUMENTATION ONLY (OUTPATIENT)
Dept: ANTICOAGULATION | Facility: CLINIC | Age: 63
End: 2023-10-04

## 2023-10-04 ENCOUNTER — ANTICOAGULATION THERAPY VISIT (OUTPATIENT)
Dept: ANTICOAGULATION | Facility: CLINIC | Age: 63
End: 2023-10-04

## 2023-10-04 DIAGNOSIS — D73.5 SPLENIC INFARCT: ICD-10-CM

## 2023-10-04 DIAGNOSIS — D73.5 SPLENIC INFARCT: Primary | ICD-10-CM

## 2023-10-04 DIAGNOSIS — D68.62 LUPUS ANTICOAGULANT DISORDER (H): ICD-10-CM

## 2023-10-04 LAB — INR BLD: 2.7 (ref 0.9–1.1)

## 2023-10-04 PROCEDURE — 36416 COLLJ CAPILLARY BLOOD SPEC: CPT

## 2023-10-04 PROCEDURE — 85610 PROTHROMBIN TIME: CPT

## 2023-10-04 NOTE — PROGRESS NOTES
ANTICOAGULATION CLINIC REFERRAL RENEWAL REQUEST       An annual renewal order is required for all patients referred to [facility] Anticoagulation Clinic.?  Please review and sign the pended referral order for Silvia Martinez.       ANTICOAGULATION SUMMARY      Warfarin indication(s)   Lupus Anticoagulant and splenic infarct    Mechanical heart valve present?  NO       Current goal range   INR: 2.0-3.0     Goal appropriate for indication? Goal INR 2-3, standard for indication(s) above     Time in Therapeutic Range (TTR)  (Goal > 60%) 89.2%       Office visit with referring provider's group within last year yes on 3/22/23       Mechelle Cox RN  [facility] Anticoagulation Clinic

## 2023-10-04 NOTE — PROGRESS NOTES
ANTICOAGULATION MANAGEMENT     Silvia Martinez 63 year old female is on warfarin with therapeutic INR result. (Goal INR 2.0-3.0)    Recent labs: (last 7 days)     10/04/23  0829   INR 2.7*       ASSESSMENT     Warfarin Lab Questionnaire    Warfarin Doses Last 7 Days      10/4/2023     8:27 AM   Dose in Tablet or Mg   TAB or MG? milligram (mg)     Pt Rptd Dose KEILA MONDAY TUESDAY WED THURS FRIDAY SATURDAY   10/4/2023   8:27 AM 5 5 5 5 5 5 5         10/4/2023   Warfarin Lab Questionnaire   Missed doses within past 14 days? No   Changes in diet or alcohol within past 14 days? Yes   Please explain:  nyquil x 3 nights   Medication changes since last result? No   Injuries or illness since last result? No   New shortness of breath, severe headaches or sudden changes in vision since last result? No   Abnormal bleeding since last result? No   Upcoming surgery, procedure? No   Best number to call with results? [phone]     Previous result: Therapeutic last 2(+) visits  Additional findings: None       PLAN     Recommended plan for temporary change(s) affecting INR     Dosing Instructions: Continue your current warfarin dose with next INR in 4 weeks       Summary  As of 10/4/2023      Full warfarin instructions:  5 mg every day   Next INR check:  11/1/2023               Detailed voice message left for Silvia with dosing instructions and follow up date.     Contact [phone] to schedule and with any changes, questions or concerns.     Education provided:   Please call back if any changes to your diet, medications or how you've been taking warfarin    Plan made per [facility] anticoagulation protocol    Mechelle Cox, RN  Anticoagulation Clinic  10/4/2023    _______________________________________________________________________     Anticoagulation Episode Summary       Current INR goal:  2.0-3.0   TTR:  89.2 % (1 y)   Target end date:  Indefinite   Send INR reminders to:  FELIBERTO CALVO    Indications    Splenic infarct  (Resolved) [D73.5]  Splenic infarct [D73.5]  Lupus anticoagulant disorder (H) with hx of splenic infarct [D68.62]             Comments:               Anticoagulation Care Providers       Provider Role Specialty Phone number    Jose Ramon Bolton MD Referring [facility] Medicine [phone]    Rosa Maria Alcaraz MD Referring [facility] Medicine [phone]    Maite Mcallister MD Responsible [facility] [phone]

## 2023-10-09 ENCOUNTER — TRANSFERRED RECORDS (OUTPATIENT)
Dept: HEALTH INFORMATION MANAGEMENT | Facility: CLINIC | Age: 63
End: 2023-10-09
Payer: COMMERCIAL

## 2023-11-01 ENCOUNTER — IMMUNIZATION (OUTPATIENT)
Dept: FAMILY MEDICINE | Facility: CLINIC | Age: 63
End: 2023-11-01
Payer: COMMERCIAL

## 2023-11-01 PROCEDURE — 90471 IMMUNIZATION ADMIN: CPT

## 2023-11-01 PROCEDURE — 90682 RIV4 VACC RECOMBINANT DNA IM: CPT

## 2023-11-08 ENCOUNTER — LAB (OUTPATIENT)
Dept: LAB | Facility: CLINIC | Age: 63
End: 2023-11-08
Payer: COMMERCIAL

## 2023-11-08 ENCOUNTER — ANTICOAGULATION THERAPY VISIT (OUTPATIENT)
Dept: ANTICOAGULATION | Facility: CLINIC | Age: 63
End: 2023-11-08

## 2023-11-08 DIAGNOSIS — D68.62 LUPUS ANTICOAGULANT DISORDER (H): ICD-10-CM

## 2023-11-08 DIAGNOSIS — D73.5 SPLENIC INFARCT: ICD-10-CM

## 2023-11-08 DIAGNOSIS — D73.5 SPLENIC INFARCT: Primary | ICD-10-CM

## 2023-11-08 LAB — INR BLD: 2.1 (ref 0.9–1.1)

## 2023-11-08 PROCEDURE — 36416 COLLJ CAPILLARY BLOOD SPEC: CPT

## 2023-11-08 PROCEDURE — 85610 PROTHROMBIN TIME: CPT

## 2023-11-08 NOTE — PROGRESS NOTES
ANTICOAGULATION MANAGEMENT     Silviamichelle Martinez 63 year old female is on warfarin with therapeutic INR result. (Goal INR 2.0-3.0)    Recent labs: (last 7 days)     11/08/23  0919   INR 2.1*       ASSESSMENT     Warfarin Lab Questionnaire    Warfarin Doses Last 7 Days      11/7/2023    10:51 AM   Dose in Tablet or Mg   TAB or MG? milligram (mg)     Pt Rptd Dose SUNDAY MONDAY TUESDAY WED THURS FRIDAY SATURDAY 11/7/2023  10:51 AM 5 5 5 5 5 5 5         11/7/2023   Warfarin Lab Questionnaire   Missed doses within past 14 days? No   Changes in diet or alcohol within past 14 days? No   Medication changes since last result? No   Injuries or illness since last result? No   New shortness of breath, severe headaches or sudden changes in vision since last result? No   Abnormal bleeding since last result? No   Upcoming surgery, procedure? No   Best number to call with results? [phone]     Previous result: Therapeutic last 2(+) visits  Additional findings:  She is going to [place] for vacation early in December. Made INR appointment for when she gets back.       PLAN     Recommended plan for no diet, medication or health factor changes affecting INR     Dosing Instructions: Continue your current warfarin dose with next INR in 5-6 weeks       Summary  As of 11/8/2023      Full warfarin instructions:  5 mg every day   Next INR check:  12/20/2023               Telephone call with Silvia who verbalizes understanding and agrees to plan and who agrees to plan and repeated back plan correctly    Lab visit scheduled    Education provided:   Please call back if any changes to your diet, medications or how you've been taking warfarin  Goal range and lab monitoring: goal range and significance of current result and Importance of therapeutic range    Plan made per [facility] anticoagulation protocol    Mechelle Cox, RN  Anticoagulation Clinic  11/8/2023    _______________________________________________________________________      Anticoagulation Episode Summary       Current INR goal:  2.0-3.0   TTR:  89.2% (1 y)   Target end date:  Indefinite   Send INR reminders to:  FELIBERTO CALVO    Indications    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

## 2023-11-15 ENCOUNTER — MYC MEDICAL ADVICE (OUTPATIENT)
Dept: FAMILY MEDICINE | Facility: CLINIC | Age: 63
End: 2023-11-15
Payer: COMMERCIAL

## 2023-11-16 NOTE — TELEPHONE ENCOUNTER
Can staff please help schedule Camden Gorman for pre op with me? I have 7am virtual on Wed Nov 29 he could come in person or there are some same day options on Fri that week or prior to his appt

## 2023-11-28 ENCOUNTER — TRANSFERRED RECORDS (OUTPATIENT)
Dept: HEALTH INFORMATION MANAGEMENT | Facility: CLINIC | Age: 63
End: 2023-11-28
Payer: COMMERCIAL

## 2023-11-28 ENCOUNTER — TELEPHONE (OUTPATIENT)
Dept: FAMILY MEDICINE | Facility: CLINIC | Age: 63
End: 2023-11-28
Payer: COMMERCIAL

## 2023-11-29 NOTE — TELEPHONE ENCOUNTER
Reason for Call:  Other call back    Detailed comments: Needs to know if prednisone will impact warfin dosage.    Phone Number Patient can be reached at: Home number on file [phone] (home)    Best Time: Anytime    Can we leave a detailed message on this number? YES    Call taken on 11/28/2023 at 8:33 PM by Jesenia Jackson

## 2023-11-29 NOTE — TELEPHONE ENCOUNTER
Called and talked with Silvia,     - Prescribed Prednisone tapering dose for arthritic knee pains and swelling.    - started 11/29/23 - tapering 30mg for 4 days, 20mg 4 days, then 10mg for the last 4 days.   (use of PREDNISONE and WARFARIN may result in increased risk of bleeding or diminished effects of warfarin.)    - she also mentioned taking Zzzquil at bedtime for sleep.     (No known interaction with warfarin)    - going to [place], [place] from 11/30 - 12/11/23.    - INR scheduled on 12/13/23 @ [facility].

## 2023-12-13 ENCOUNTER — ANTICOAGULATION THERAPY VISIT (OUTPATIENT)
Dept: ANTICOAGULATION | Facility: CLINIC | Age: 63
End: 2023-12-13

## 2023-12-13 ENCOUNTER — LAB (OUTPATIENT)
Dept: LAB | Facility: CLINIC | Age: 63
End: 2023-12-13
Payer: COMMERCIAL

## 2023-12-13 DIAGNOSIS — D68.62 LUPUS ANTICOAGULANT DISORDER (H): ICD-10-CM

## 2023-12-13 DIAGNOSIS — D73.5 SPLENIC INFARCT: Primary | ICD-10-CM

## 2023-12-13 DIAGNOSIS — D73.5 SPLENIC INFARCT: ICD-10-CM

## 2023-12-13 LAB — INR BLD: 3.3 (ref 0.9–1.1)

## 2023-12-13 PROCEDURE — 85610 PROTHROMBIN TIME: CPT

## 2023-12-13 PROCEDURE — 36416 COLLJ CAPILLARY BLOOD SPEC: CPT

## 2023-12-13 NOTE — PROGRESS NOTES
Patient is return the phone call, please give her a call back at the same number.    Darlene Brandon   [facility]  Central Scheduler

## 2023-12-13 NOTE — PROGRESS NOTES
ANTICOAGULATION MANAGEMENT     Silvia Martinez 63 year old female is on warfarin with supratherapeutic INR result. (Goal INR 2.0-3.0)    Recent labs: (last 7 days)     12/13/23  1117   INR 3.3*       ASSESSMENT     Warfarin Lab Questionnaire    Warfarin Doses Last 7 Days      12/12/2023    11:43 AM   Dose in Tablet or Mg   TAB or MG? milligram (mg)     Pt Rptd Dose SUNDAY MONDAY TUESDAY WED THURS FRIDAY SATURDAY 12/12/2023  11:43 AM 5 5 5 5 5 5 5         12/12/2023   Warfarin Lab Questionnaire   Missed doses within past 14 days? No   Changes in diet or alcohol within past 14 days? No   Medication changes since last result? Yes   Please list: Prednisone. Omeprazole. Famotidine. Tylenol.   Injuries or illness since last result? Yes   If yes, please explain: Knee swelling. GERD.   New shortness of breath, severe headaches or sudden changes in vision since last result? No   Abnormal bleeding since last result? No   Upcoming surgery, procedure? No   Best number to call with results? [phone]     Previous result: Therapeutic last 2(+) visits  Additional findings:  she prefers to eat a salad today.  She will only be on the Omeprazole for a few more days. Finished the prednisone a few days ago. She did go on her vacation also.        PLAN     Recommended plan for temporary change(s) affecting INR     Dosing Instructions: Continue your current warfarin dose with next INR in 1-2 weeks       Summary  As of 12/13/2023      Full warfarin instructions:  5 mg every day   Next INR check:  12/27/2023               Telephone call with Silvia who verbalizes understanding and agrees to plan and who agrees to plan and repeated back plan correctly    Lab visit scheduled    Education provided:   Please call back if any changes to your diet, medications or how you've been taking warfarin  Goal range and lab monitoring: goal range and significance of current result and Importance of therapeutic range    Plan made per [facility] anticoagulation  protocol    Mechelle Cox RN  Anticoagulation Clinic  12/13/2023    _______________________________________________________________________     Anticoagulation Episode Summary       Current INR goal:  2.0-3.0   TTR:  86.8% (1 y)   Target end date:  Indefinite   Send INR reminders to:  FELIBERTO CALVO    Indications    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

## 2023-12-20 ENCOUNTER — LAB (OUTPATIENT)
Dept: LAB | Facility: CLINIC | Age: 63
End: 2023-12-20
Payer: COMMERCIAL

## 2023-12-20 ENCOUNTER — ANTICOAGULATION THERAPY VISIT (OUTPATIENT)
Dept: ANTICOAGULATION | Facility: CLINIC | Age: 63
End: 2023-12-20

## 2023-12-20 DIAGNOSIS — D73.5 SPLENIC INFARCT: Primary | ICD-10-CM

## 2023-12-20 DIAGNOSIS — D68.62 LUPUS ANTICOAGULANT DISORDER (H): ICD-10-CM

## 2023-12-20 DIAGNOSIS — D73.5 SPLENIC INFARCT: ICD-10-CM

## 2023-12-20 LAB — INR BLD: 3.5 (ref 0.9–1.1)

## 2023-12-20 PROCEDURE — 36416 COLLJ CAPILLARY BLOOD SPEC: CPT

## 2023-12-20 PROCEDURE — 85610 PROTHROMBIN TIME: CPT

## 2023-12-20 NOTE — PROGRESS NOTES
ANTICOAGULATION MANAGEMENT     Silvia Martinez 63 year old female is on warfarin with supratherapeutic INR result. (Goal INR 2.0-3.0)    Recent labs: (last 7 days)     12/20/23  1318   INR 3.5*       ASSESSMENT     Warfarin Lab Questionnaire    Warfarin Doses Last 7 Days      12/19/2023     1:43 PM   Dose in Tablet or Mg   TAB or MG? milligram (mg)     Pt Rptd Dose SUNDAY MONDAY TUESDAY WED THURS FRIDAY SATURDAY 12/19/2023   1:43 PM 5 5 5 5 5 5 5         12/19/2023   Warfarin Lab Questionnaire   Missed doses within past 14 days? No   Changes in diet or alcohol within past 14 days? No-maybe didn't eat enough greens the first day with the high INR     Medication changes since last result? Yes   Please list: Tylenol. Some nyquil.   Injuries or illness since last result? No   New shortness of breath, severe headaches or sudden changes in vision since last result? No   Abnormal bleeding since last result? No   Upcoming surgery, procedure? No   Best number to call with results? [phone]     Previous result: Supratherapeutic  Additional findings:  Tylenol only occasional.  Discussed if still  high at next INR will need to adjust her warfarin.        PLAN     Recommended plan for temporary change(s) affecting INR     Dosing Instructions: hold dose then continue your current warfarin dose with next INR in 2 weeks       Summary  As of 12/20/2023      Full warfarin instructions:  12/20: Hold; Otherwise 5 mg every day   Next INR check:  1/3/2024               Telephone call with Silvia who verbalizes understanding and agrees to plan and who agrees to plan and repeated back plan correctly    Patient offered & declined to schedule next visit    Education provided:   Please call back if any changes to your diet, medications or how you've been taking warfarin  Goal range and lab monitoring: goal range and significance of current result and Importance of therapeutic range    Plan made per [facility] anticoagulation protocol    Mechelle  LUIZA Cox  Anticoagulation Clinic  12/20/2023    _______________________________________________________________________     Anticoagulation Episode Summary       Current INR goal:  2.0-3.0   TTR:  84.9% (1 y)   Target end date:  Indefinite   Send INR reminders to:  ANTICONATI CALVO    Indications    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

## 2024-01-03 ENCOUNTER — ANTICOAGULATION THERAPY VISIT (OUTPATIENT)
Dept: ANTICOAGULATION | Facility: CLINIC | Age: 64
End: 2024-01-03

## 2024-01-03 ENCOUNTER — LAB (OUTPATIENT)
Dept: LAB | Facility: CLINIC | Age: 64
End: 2024-01-03
Payer: COMMERCIAL

## 2024-01-03 DIAGNOSIS — D68.62 LUPUS ANTICOAGULANT DISORDER (H): ICD-10-CM

## 2024-01-03 DIAGNOSIS — D73.5 SPLENIC INFARCT: Primary | ICD-10-CM

## 2024-01-03 DIAGNOSIS — D73.5 SPLENIC INFARCT: ICD-10-CM

## 2024-01-03 LAB — INR BLD: 2.3 (ref 0.9–1.1)

## 2024-01-03 PROCEDURE — 85610 PROTHROMBIN TIME: CPT

## 2024-01-03 PROCEDURE — 36416 COLLJ CAPILLARY BLOOD SPEC: CPT

## 2024-01-03 NOTE — PROGRESS NOTES
ANTICOAGULATION MANAGEMENT     Silvia WEIR Juan 63 year old female is on warfarin with therapeutic INR result. (Goal INR 2.0-3.0)    Recent labs: (last 7 days)     01/03/24  1021   INR 2.3*       ASSESSMENT     Warfarin Lab Questionnaire    Warfarin Doses Last 7 Days      1/2/2024    11:19 AM   Dose in Tablet or Mg   TAB or MG? milligram (mg)     Pt Rptd Dose SUNDAY MONDAY TUESDAY WED THURS FRIDAY SATURDAY 1/2/2024  11:19 AM 5 5 5 5 5 5 5         1/2/2024   Warfarin Lab Questionnaire   Missed doses within past 14 days? No   Changes in diet or alcohol within past 14 days? No   Medication changes since last result? Yes   Please list: Topical Diclofenac   Injuries or illness since last result? No   New shortness of breath, severe headaches or sudden changes in vision since last result? No   Abnormal bleeding since last result? No   Upcoming surgery, procedure? No   Best number to call with results? [phone]     Previous result: Supratherapeutic  Additional findings: None       PLAN     Recommended plan for no diet, medication or health factor changes affecting INR     Dosing Instructions: Continue your current warfarin dose with next INR in 3 weeks       Summary  As of 1/3/2024      Full warfarin instructions:  5 mg every day   Next INR check:  1/24/2024               Telephone call with Silvia who verbalizes understanding and agrees to plan and who agrees to plan and repeated back plan correctly    Lab visit scheduled    Education provided:   Please call back if any changes to your diet, medications or how you've been taking warfarin  Goal range and lab monitoring: goal range and significance of current result and Importance of therapeutic range    Plan made per [facility] anticoagulation protocol    Mechelle Cox, RN  Anticoagulation Clinic  1/3/2024    _______________________________________________________________________     Anticoagulation Episode Summary       Current INR goal:  2.0-3.0   TTR:  83.3% (1 y)   Target  end date:  Indefinite   Send INR reminders to:  FELIBERTO CALVO    Indications    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

## 2024-01-12 ENCOUNTER — TRANSFERRED RECORDS (OUTPATIENT)
Dept: HEALTH INFORMATION MANAGEMENT | Facility: CLINIC | Age: 64
End: 2024-01-12
Payer: COMMERCIAL

## 2024-01-24 ENCOUNTER — LAB (OUTPATIENT)
Dept: LAB | Facility: CLINIC | Age: 64
End: 2024-01-24
Payer: COMMERCIAL

## 2024-01-24 ENCOUNTER — ANTICOAGULATION THERAPY VISIT (OUTPATIENT)
Dept: ANTICOAGULATION | Facility: CLINIC | Age: 64
End: 2024-01-24

## 2024-01-24 DIAGNOSIS — D73.5 SPLENIC INFARCT: ICD-10-CM

## 2024-01-24 DIAGNOSIS — D68.62 LUPUS ANTICOAGULANT DISORDER (H): ICD-10-CM

## 2024-01-24 DIAGNOSIS — D73.5 SPLENIC INFARCT: Primary | ICD-10-CM

## 2024-01-24 LAB — INR BLD: 2 (ref 0.9–1.1)

## 2024-01-24 PROCEDURE — 85610 PROTHROMBIN TIME: CPT

## 2024-01-24 PROCEDURE — 36416 COLLJ CAPILLARY BLOOD SPEC: CPT

## 2024-01-24 NOTE — PROGRESS NOTES
ANTICOAGULATION MANAGEMENT     Silvia Martinez 63 year old female is on warfarin with therapeutic INR result. (Goal INR 2.0-3.0)    Recent labs: (last 7 days)     01/24/24  1018   INR 2.0*       ASSESSMENT     Warfarin Lab Questionnaire    Warfarin Doses Last 7 Days      1/24/2024    10:13 AM   Dose in Tablet or Mg   TAB or MG? milligram (mg)     Pt Rptd Dose SUNDAY MONDAY TUESDAY WED THURS FRIDAY SATURDAY 1/24/2024  10:13 AM 5 5 5 5 5 5 5         1/24/2024   Warfarin Lab Questionnaire   Missed doses within past 14 days? No   Changes in diet or alcohol within past 14 days? No   Medication changes since last result? Yes   Please list: Euflexxa knee injection 1/22   Injuries or illness since last result? No   New shortness of breath, severe headaches or sudden changes in vision since last result? No   Abnormal bleeding since last result? No   Upcoming surgery, procedure? No   Best number to call with results? [phone]     Previous result: Therapeutic last visit; previously outside of goal range  Additional findings: None       PLAN     Recommended plan for no diet, medication or health factor changes affecting INR     Dosing Instructions: Continue your current warfarin dose with next INR in 4 weeks       Summary  As of 1/24/2024      Full warfarin instructions:  5 mg every day   Next INR check:  2/21/2024               Detailed voice message left for Silvia with dosing instructions and follow up date.     Contact [phone] to schedule and with any changes, questions or concerns.     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 [phone]

## 2024-03-06 ENCOUNTER — LAB (OUTPATIENT)
Dept: LAB | Facility: CLINIC | Age: 64
End: 2024-03-06
Payer: COMMERCIAL

## 2024-03-06 ENCOUNTER — ANTICOAGULATION THERAPY VISIT (OUTPATIENT)
Dept: ANTICOAGULATION | Facility: CLINIC | Age: 64
End: 2024-03-06

## 2024-03-06 DIAGNOSIS — D73.5 SPLENIC INFARCT: ICD-10-CM

## 2024-03-06 DIAGNOSIS — D68.62 LUPUS ANTICOAGULANT DISORDER (H): ICD-10-CM

## 2024-03-06 DIAGNOSIS — D73.5 SPLENIC INFARCT: Primary | ICD-10-CM

## 2024-03-06 LAB — INR BLD: 2.4 (ref 0.9–1.1)

## 2024-03-06 PROCEDURE — 85610 PROTHROMBIN TIME: CPT

## 2024-03-06 PROCEDURE — 36416 COLLJ CAPILLARY BLOOD SPEC: CPT

## 2024-03-06 NOTE — PROGRESS NOTES
ANTICOAGULATION MANAGEMENT     Silvia Martinez 63 year old female is on warfarin with therapeutic INR result. (Goal INR 2.0-3.0)    Recent labs: (last 7 days)     03/06/24  1345   INR 2.4*       ASSESSMENT     Source(s): Chart Review  Previous INR was Therapeutic last 2(+) visits  Medication, diet, health changes since last INR chart reviewed; none identified  Including care everywhere refresh       PLAN     Recommended plan for no diet, medication or health factor changes affecting INR     Dosing Instructions: Continue your current warfarin dose with next INR in 5 weeks       Summary  As of 3/6/2024      Full warfarin instructions:  5 mg every day   Next INR check:  4/10/2024               Detailed voice message left for Silvia with dosing instructions and follow up date.   Sent [employer] message with dosing and follow up instructions    Contact [phone] to schedule and with any changes, questions or concerns.     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

## 2024-04-08 ENCOUNTER — TRANSFERRED RECORDS (OUTPATIENT)
Dept: HEALTH INFORMATION MANAGEMENT | Facility: CLINIC | Age: 64
End: 2024-04-08
Payer: COMMERCIAL

## 2024-04-09 ENCOUNTER — ANTICOAGULATION THERAPY VISIT (OUTPATIENT)
Dept: ANTICOAGULATION | Facility: CLINIC | Age: 64
End: 2024-04-09
Payer: COMMERCIAL

## 2024-04-09 ENCOUNTER — TRANSFERRED RECORDS (OUTPATIENT)
Dept: HEALTH INFORMATION MANAGEMENT | Facility: CLINIC | Age: 64
End: 2024-04-09
Payer: COMMERCIAL

## 2024-04-09 DIAGNOSIS — D73.5 SPLENIC INFARCT: Primary | ICD-10-CM

## 2024-04-09 DIAGNOSIS — D68.62 LUPUS ANTICOAGULANT DISORDER (H): ICD-10-CM

## 2024-04-09 LAB — INR (EXTERNAL): 2.3 (ref 0.9–1.1)

## 2024-04-09 NOTE — PROGRESS NOTES
ANTICOAGULATION MANAGEMENT     Silvia Martinez 63 year old female is on warfarin with therapeutic INR result. (Goal INR 2.0-3.0)    Recent labs: (last 7 days)     04/08/24  1200   INR 2.3*       ASSESSMENT     Source(s): Chart Review and Patient/Caregiver Call     Warfarin doses taken: Warfarin taken as instructed  Diet: No new diet changes identified  Medication/supplement changes:  Augmentin 7 day course (dates: 4/5-4/11) has not reacted in the past and has been on numerous times.  New illness, injury, or hospitalization: Yes: sinus infection  Signs or symptoms of bleeding or clotting: No  Previous result: Therapeutic last 2(+) visits  Additional findings:  INR done prior to her MOHS procedure.       PLAN     Recommended plan for temporary change(s) affecting INR     Dosing Instructions: Continue your current warfarin dose with next INR in 6 weeks       Summary  As of 4/9/2024      Full warfarin instructions:  5 mg every day   Next INR check:  5/20/2024               Telephone call with Silvia who agrees to plan and repeated back plan correctly    Lab visit scheduled    Education provided:   Please call back if any changes to your diet, medications or how you've been taking warfarin  Goal range and lab monitoring: goal range and significance of current result and Importance of therapeutic range    Plan made per [facility] anticoagulation protocol    Mechelle Cox, RN  Anticoagulation Clinic  4/9/2024    _______________________________________________________________________     Anticoagulation Episode Summary       Current INR goal:  2.0-3.0   TTR:  86.9% (1 y)   Target end date:  Indefinite   Send INR reminders to:  FELIBERTO CALVO    Indications    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

## 2024-04-12 DIAGNOSIS — I77.3 ARTERIAL FIBROMUSCULAR DYSPLASIA (H): ICD-10-CM

## 2024-04-12 DIAGNOSIS — D68.62 LUPUS ANTICOAGULANT DISORDER (H): ICD-10-CM

## 2024-04-12 RX ORDER — WARFARIN SODIUM 5 MG/1
5 TABLET ORAL DAILY
Qty: 90 TABLET | Refills: 0 | Status: SHIPPED | OUTPATIENT
Start: 2024-04-12 | End: 2024-04-17

## 2024-04-12 SDOH — HEALTH STABILITY: PHYSICAL HEALTH: ON AVERAGE, HOW MANY DAYS PER WEEK DO YOU ENGAGE IN MODERATE TO STRENUOUS EXERCISE (LIKE A BRISK WALK)?: 0 DAYS

## 2024-04-12 SDOH — HEALTH STABILITY: PHYSICAL HEALTH: ON AVERAGE, HOW MANY MINUTES DO YOU ENGAGE IN EXERCISE AT THIS LEVEL?: 0 MIN

## 2024-04-12 ASSESSMENT — SOCIAL DETERMINANTS OF HEALTH (SDOH): HOW OFTEN DO YOU GET TOGETHER WITH FRIENDS OR RELATIVES?: TWICE A WEEK

## 2024-04-12 NOTE — TELEPHONE ENCOUNTER
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]  Splenic infarct [D73.5]  Lupus anticoagulant disorder (H) with hx of splenic infarct [D68.62]                 Anticoagulation Care Providers       Provider Role Specialty Phone number    Jose Ramon Bolton MD Referring Family Medicine [phone]    Rosa Maria Alcaraz MD Referring Family Medicine [phone]    Maite Mcallister MD Referring [facility] Medicine [phone]            Refill Criteria    Visit with referring provider/group: Overdue for referring provider visit, last visit on 3/22/23    [facility] referral last signed: 10/04/2023; within last year: Yes    Lab monitoring not exceeding 2 weeks overdue: Yes    Silvia does NOT meet all criteria for refill: Visit with referring provider's group was >= 1 year ago. 90 day tiffany fill approved; patient notified to schedule with referring provider per [facility] protocol    Mechelle Cox RN  Anticoagulation Clinic

## 2024-04-12 NOTE — COMMUNITY RESOURCES LIST (ENGLISH)
April 12, 2024           YOUR PERSONALIZED LIST OF SERVICES & PROGRAMS           & RECREATION    Sports      Fitness - Coaching & Training Services  [address] [place] [address] (Distance: 4.9 miles)  Phone: [phone]  Website: https://www.Artwardly/training/  Language: English      of the [place] - Sports clubs and recreational activities - [facility]  [address] [place] [address] (Distance: 2.3 miles)  Language: English  Fee: Self pay, Sliding scale      [facility] - Adult Enrichment  Phone: [phone]  Website: https://[employer]/adults-seniors/adult-enrichment/  Language: English  Hours: Mon 7:30 AM - 4:00 PM Tue 7:30 AM - 4:00 PM Wed 7:30 AM - 4:00 PM Thu 7:30 AM - 4:00 PM Fri 7:30 AM - 4:00 PM    Classes/Groups      of the [facility] Group fitness classes - [facility]  [address] [address] (Distance: 2.3 miles)  Language: English  Fee: Free, Self pay, Sliding scale      of [place] - Personal training  [address] [address] (Distance: 2.3 miles)  Language: English      [facility] - Adult Enrichment  Phone: [phone]  Website: https://[employer]/adults-seniors/adult-enrichment/  Language: English  Hours: Mon 7:30 AM - 4:00 PM Tue 7:30 AM - 4:00 PM Wed 7:30 AM - 4:00 PM Thu 7:30 AM - 4:00 PM Fri 7:30 AM - 4:00 PM               IMPORTANT NUMBERS & WEBSITES        Emergency Services  911  .   United Way  211 http://211unitedway.org  .   Poison Control  (469) 636-8731 http://mnpoison.org http://wisconsinpoison.org  .     Suicide and Crisis Lifeline  988 http://988lifeline.org  .   Childhelp National Child Abuse Hotline  437.979.3796 http://Childhelphotline.org   .   National Sexual Assault Hotline  (431) 129-5616 (HOPE) http://Rainn.org   .     National Runaway Safeline  (735) 862-4359 (RUNAWAY) http://1800runaway.org  .   Pregnancy & Postpartum  Support  Call/text 683-043-2214  [place]: http://ppsupportmn.org  [place]: http://Vovici.com/wi  .   Substance Abuse National Helpline ([facility])  474-612-HELP (1722) http://Findtreatment.gov   .                DISCLAIMER: These resources have been generated via the 1-800-DENTIST Platform. [employer]ENTIST does not endorse any service providers mentioned in this resource list. [employer] does not guarantee that the services mentioned in this resource list will be available to you or will improve your health or wellness.    [facility]

## 2024-04-17 ENCOUNTER — OFFICE VISIT (OUTPATIENT)
Dept: FAMILY MEDICINE | Facility: CLINIC | Age: 64
End: 2024-04-17
Payer: COMMERCIAL

## 2024-04-17 VITALS
HEART RATE: 75 BPM | DIASTOLIC BLOOD PRESSURE: 68 MMHG | HEIGHT: 66 IN | BODY MASS INDEX: 29.78 KG/M2 | WEIGHT: 185.3 LBS | OXYGEN SATURATION: 99 % | SYSTOLIC BLOOD PRESSURE: 110 MMHG

## 2024-04-17 DIAGNOSIS — E03.9 ACQUIRED HYPOTHYROIDISM: ICD-10-CM

## 2024-04-17 DIAGNOSIS — Z79.01 CHRONIC ANTICOAGULATION: ICD-10-CM

## 2024-04-17 DIAGNOSIS — N95.2 ATROPHIC VAGINITIS: ICD-10-CM

## 2024-04-17 DIAGNOSIS — Z00.00 ROUTINE GENERAL MEDICAL EXAMINATION AT A HEALTH CARE FACILITY: Primary | ICD-10-CM

## 2024-04-17 DIAGNOSIS — D68.62 LUPUS ANTICOAGULANT DISORDER (H): ICD-10-CM

## 2024-04-17 DIAGNOSIS — I77.3 ARTERIAL FIBROMUSCULAR DYSPLASIA (H): ICD-10-CM

## 2024-04-17 PROBLEM — D73.5 SPLENIC INFARCT: Status: RESOLVED | Noted: 2022-11-07 | Resolved: 2024-04-17

## 2024-04-17 LAB
ERYTHROCYTE [DISTWIDTH] IN BLOOD BY AUTOMATED COUNT: 12.3 % (ref 10–15)
HBA1C MFR BLD: 5.3 % (ref 0–5.6)
HCT VFR BLD AUTO: 44.5 % (ref 35–47)
HGB BLD-MCNC: 14.9 G/DL (ref 11.7–15.7)
MCH RBC QN AUTO: 30.9 PG (ref 26.5–33)
MCHC RBC AUTO-ENTMCNC: 33.5 G/DL (ref 31.5–36.5)
MCV RBC AUTO: 92 FL (ref 78–100)
PLATELET # BLD AUTO: 279 10E3/UL (ref 150–450)
RBC # BLD AUTO: 4.82 10E6/UL (ref 3.8–5.2)
WBC # BLD AUTO: 5 10E3/UL (ref 4–11)

## 2024-04-17 PROCEDURE — 99214 OFFICE O/P EST MOD 30 MIN: CPT | Mod: 25 | Performed by: FAMILY MEDICINE

## 2024-04-17 PROCEDURE — 80061 LIPID PANEL: CPT | Performed by: FAMILY MEDICINE

## 2024-04-17 PROCEDURE — 36415 COLL VENOUS BLD VENIPUNCTURE: CPT | Performed by: FAMILY MEDICINE

## 2024-04-17 PROCEDURE — 84443 ASSAY THYROID STIM HORMONE: CPT | Performed by: FAMILY MEDICINE

## 2024-04-17 PROCEDURE — 83036 HEMOGLOBIN GLYCOSYLATED A1C: CPT | Performed by: FAMILY MEDICINE

## 2024-04-17 PROCEDURE — 80053 COMPREHEN METABOLIC PANEL: CPT | Performed by: FAMILY MEDICINE

## 2024-04-17 PROCEDURE — 99396 PREV VISIT EST AGE 40-64: CPT | Performed by: FAMILY MEDICINE

## 2024-04-17 PROCEDURE — 85027 COMPLETE CBC AUTOMATED: CPT | Performed by: FAMILY MEDICINE

## 2024-04-17 RX ORDER — WARFARIN SODIUM 5 MG/1
5 TABLET ORAL DAILY
Qty: 90 TABLET | Refills: 2 | Status: SHIPPED | OUTPATIENT
Start: 2024-04-17

## 2024-04-17 RX ORDER — ESTRADIOL 0.1 MG/G
2 CREAM VAGINAL
Qty: 42.5 G | Refills: 4 | Status: SHIPPED | OUTPATIENT
Start: 2024-04-18

## 2024-04-17 RX ORDER — LEVOTHYROXINE SODIUM 100 UG/1
100 TABLET ORAL DAILY
Qty: 90 TABLET | Refills: 3 | Status: SHIPPED | OUTPATIENT
Start: 2024-04-17

## 2024-04-17 NOTE — PATIENT INSTRUCTIONS
"Send us a healthcare directive copy for our records when able    Plantar Fasciitis Treatment  ?  Performing of stretching exercises for the plantar fascia and calf muscles, which the patient can do at home.  ?  Avoiding the use of flat shoes and barefoot walking.  ?  Using prefabricated, over-the-counter, silicone heel shoe inserts (arch supports and/or heel cups).  ?  Decreasing physical activities that are suggested by the medical history to be causative or aggravating (eg, excessive running, dancing, or jumping).  ?  Prescribing or recommending a short-term trial (two to three weeks) of nonsteroidal antiinflammatory drugs (NSAIDs).   ?  Injecting the tender areas of the plantar region with glucocorticoids and a local anesthetic.     Calf stretches                                                           Toe curls: curl then straighten toes for 2 min   Toe Curls- create the ABCs with your foot       Single sided Toe raises- Unilateral heel raises are performed on a step or platform with a towel under the toes to increase dorsal flexion of the toes during heel raises. Each heel raise consists of three seconds going up (concentric phase) and three seconds going down (eccentric phase), with a two-second pause (isometric phase) at the top of the exercise. This can be done up to 12 times in a row. For patients who cannot tolerate the exercise on one leg, it can be performed using both legs. This exercise can be performed every other day.         Other ideas:   Curl toes up along leg and \"walk up leg\" with your toes  Curl toes and  marbles      "

## 2024-04-17 NOTE — PROGRESS NOTES
"Preventive Care Visit  [facility]  Maite Mcallister MD, Family Medicine  Apr 17, 2024      Assessment & Plan     Routine general medical examination at a health care facility  Overall doing well despite the grief; coping well with strong varghese and family support  - Comprehensive metabolic panel  - Hemoglobin A1c  - Lipid panel reflex to direct LDL Fasting  - CBC with platelets  - TSH    Acquired hypothyroidism  Stable, TSH due today; refill sent  - levothyroxine (SYNTHROID/LEVOTHROID) 100 MCG tablet; Take 1 tablet (100 mcg) by mouth daily    Atrophic vaginitis  Stable, refilled  - estradiol (ESTRACE) 0.1 MG/GM vaginal cream; Place 2 g vaginally twice a week    Chronic anticoagulation  Lupus anticoagulant disorder (H) with hx of splenic infarct  Arterial fibromuscular dysplasia (H24)  Doing well, INR goal 2-3; followed by [facility]- reviewed 3/21/23 notes in detail. Has follow up soon.  - warfarin refilled, appreciate [facility] team management               BMI  Estimated body mass index is 29.53 kg/m  as calculated from the following:    Height as of this encounter: 1.687 m (5' 6.42\").    Weight as of this encounter: 84.1 kg (185 lb 4.8 oz).       Counseling  Appropriate preventive services were discussed with this patient, including applicable screening as appropriate for fall prevention, nutrition, physical activity, Tobacco-use cessation, weight loss and cognition.  Checklist reviewing preventive services available has been given to the patient.  Reviewed patient's diet, addressing concerns and/or questions.           Rosalee Vega is a 63 year old, presenting for the following:  Physical (Fasting- Mammo will be done at [place] Tuesday 4/23)        4/17/2024     7:06 AM   Additional Questions   Roomed by Kathe FAIR LPN        Health Care Directive  Patient does not have a Health Care Directive or Living Will: Patient states has Advance Directive and will bring in a copy to " clinic.    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    Had a sinus infection recently; had augmentin for this prescribed by family physician    Recent tick bite but it was <24hr duration; sore ear on the right side- the tick was removed completely    Seeing [facility] vascular medicine Dr. Sexton for her conditions of:  Celiac artery dissection with splenic infarction  Fibromuscular dysplasia  Segmental arterial mediolysis  Lupus-like anticoagulant     Every other year imaging. Continues on coumadin which we manage through the [facility] clinic locally     Laboratory assessment reveals a normal CBC. The INR is 2.3 on 4/8. The chemistry group is satisfactory with the exception of an elevated ALT with known hepatosteatosis. The lipid profile from last year reveals a total cholesterol of 195, HDL 60, triglycerides 99, and .      Social History     Social History Narrative     to [place]; 3 boys- all  at least with 1 child; 5 total grandchildren. All three families are living locally.     Provides a lot of care for her parents.    [facility]    Maite Mcallister MD                   4/12/2024   General Health   How would you rate your overall physical health? Excellent   Feel stress (tense, anxious, or unable to sleep) To some extent   (!) STRESS CONCERN      4/12/2024   Nutrition   Three  or more servings of calcium each day? Yes   Diet: Regular (no restrictions)   How many servings of fruit and vegetables per day? (!) 2-3   How many sweetened beverages each day? 0-1         4/12/2024   Exercise   Days per week of moderate/strenous exercise 0 days   Average minutes spent exercising at this level 0 min   (!) EXERCISE CONCERN      4/12/2024   Social Factors   Frequency of gathering with friends or relatives Twice a week   Worry food won't last until get money to buy more No   Food not last or not have enough money for food? No   Do you have housing?  Yes   Are you worried about losing your housing? No   Lack of transportation? No   Unable to get utilities (heat,electricity)? No         4/12/2024   Fall Risk   Fallen 2 or more times in the past year? No   Trouble with walking or balance? No          4/12/2024   Dental   Dentist two times every year? Yes         4/12/2024   TB Screening   Were you born outside of the [place]? No         Today's PHQ-2 Score:       4/16/2024     7:32 AM   PHQ-2 ( 1999 Pfizer)   Q1: Little interest or pleasure in doing things 0   Q2: Feeling down, depressed or hopeless 0   PHQ-2 Score 0   Q1: Little interest or pleasure in doing things Not at all   Q2: Feeling down, depressed or hopeless Not at all   PHQ-2 Score 0           4/12/2024   Substance Use   Alcohol more than 3/day or more than 7/wk No   Do you use any other substances recreationally? No     Social History     Tobacco Use    Smoking status: Never    Smokeless tobacco: Never   Vaping Use    Vaping status: Never Used   Substance Use Topics    Alcohol use: No    Drug use: No           3/15/2023   LAST FHS-7 RESULTS   1st degree relative breast or ovarian cancer No   Any relative bilateral breast cancer No   Any male have breast cancer No   Any ONE woman have BOTH breast AND ovarian cancer Yes   Any woman with breast cancer before 50yrs No   2 or more relatives with breast AND/OR ovarian cancer No   2 or more relatives  "with breast AND/OR bowel cancer No                4/12/2024   STI Screening   New sexual partner(s) since last STI/HIV test? No     History of abnormal Pap smear: Status post benign hysterectomy. Health Maintenance and Surgical History updated.       ASCVD Risk   The ASCVD Risk score (Theron KING, et al., 2019) failed to calculate for the following reasons:    Cannot find a previous HDL lab    Cannot find a previous total cholesterol lab           Reviewed and updated as needed this visit by Provider   Tobacco  Allergies  Meds  Problems  Med Hx  Surg Hx  Fam Hx                     Objective    Exam  /68 (BP Location: Left arm, Patient Position: Sitting, Cuff Size: Adult Large)   Pulse 75   Ht 1.687 m (5' 6.42\")   Wt 84.1 kg (185 lb 4.8 oz)   LMP  (LMP Unknown)   SpO2 99%   BMI 29.53 kg/m     Estimated body mass index is 29.53 kg/m  as calculated from the following:    Height as of this encounter: 1.687 m (5' 6.42\").    Weight as of this encounter: 84.1 kg (185 lb 4.8 oz).    Physical Exam  GENERAL: alert and no distress  EYES: Eyes grossly normal to inspection, PERRL and conjunctivae and sclerae normal  HENT: ear canals and TM's normal, nose and mouth without ulcers or lesions  NECK: no adenopathy, no asymmetry, masses, or scars  RESP: lungs clear to auscultation - no rales, rhonchi or wheezes  CV: regular rate and rhythm, normal S1 S2, no S3 or S4, no murmur, click or rub, no peripheral edema  ABDOMEN: soft, nontender, no hepatosplenomegaly, no masses and bowel sounds normal  MS: no gross musculoskeletal defects noted, no edema  SKIN: no suspicious lesions or rashes  NEURO: Normal strength and tone, mentation intact and speech normal  PSYCH: mentation appears normal, affect normal/bright        Signed Electronically by: Maite Mcallister MD    "

## 2024-04-18 LAB
ALBUMIN SERPL BCG-MCNC: 4.4 G/DL (ref 3.5–5.2)
ALP SERPL-CCNC: 71 U/L (ref 40–150)
ALT SERPL W P-5'-P-CCNC: 55 U/L (ref 0–50)
ANION GAP SERPL CALCULATED.3IONS-SCNC: 11 MMOL/L (ref 7–15)
AST SERPL W P-5'-P-CCNC: 47 U/L (ref 0–45)
BILIRUB SERPL-MCNC: 0.5 MG/DL
BUN SERPL-MCNC: 11.2 MG/DL (ref 8–23)
CALCIUM SERPL-MCNC: 9.6 MG/DL (ref 8.8–10.2)
CHLORIDE SERPL-SCNC: 103 MMOL/L (ref 98–107)
CHOLEST SERPL-MCNC: 206 MG/DL
CREAT SERPL-MCNC: 0.75 MG/DL (ref 0.51–0.95)
DEPRECATED HCO3 PLAS-SCNC: 25 MMOL/L (ref 22–29)
EGFRCR SERPLBLD CKD-EPI 2021: 88 ML/MIN/1.73M2
FASTING STATUS PATIENT QL REPORTED: YES
GLUCOSE SERPL-MCNC: 95 MG/DL (ref 70–99)
HDLC SERPL-MCNC: 49 MG/DL
LDLC SERPL CALC-MCNC: 133 MG/DL
NONHDLC SERPL-MCNC: 157 MG/DL
POTASSIUM SERPL-SCNC: 4.2 MMOL/L (ref 3.4–5.3)
PROT SERPL-MCNC: 7.8 G/DL (ref 6.4–8.3)
SODIUM SERPL-SCNC: 139 MMOL/L (ref 135–145)
TRIGL SERPL-MCNC: 122 MG/DL
TSH SERPL DL<=0.005 MIU/L-ACNC: 2.92 UIU/ML (ref 0.3–4.2)

## 2024-04-26 NOTE — TELEPHONE ENCOUNTER
"Patient calling with diarrhea that started Friday at noon. Patient reports she was with her son on Thursday and he is having the same symptoms. Patient reports pure liquid diarrhea and is having less volume but the frequency is the same. Patient reports at least 20 episodes. Patient is drinking lots of water. Patient reports feeling lightheaded when she sits up or stands up but it is \"transient\" and goes away when she sits or lays down.  Patient had her INR on Friday morning and it was 2.9. Patient has been drinking primarily gatorade zero and \"liquid IV\". Patient has also been taking zofran ODT which has helped with the nausea.   Patient states that she is producing urine regularly and has had 60-70 ounces of liquids. Patient states her main concern was about her coumadin dose and is concerned about taking 7.5 mg this evening. 5 mg five days a week and 7.5 mg Sunday and Wednesday. Next INR is May 4th.   Protocol recommends see HCP within 4 hours or PCP triage  Page to on call provider Dr. Jose Grove at 7:51 pm  Provider returning page promptly and is recommending patient hold coumadin for a couple days until she is eating again.     Provider Recommendation Follow Up:   Reached patient/caregiver. Informed of provider's recommendations. Patient verbalized understanding and agrees with the plan.   Gwendolyn Murillo RN   04/24/22 7:59 PM  [facility] Nurse Advisor  COVID 19 Nurse Triage Plan/Patient Instructions    Please be aware that novel coronavirus (COVID-19) may be circulating in the community. If you develop symptoms such as fever, cough, or SOB or if you have concerns about the presence of another infection including coronavirus (COVID-19), please contact your health care provider or visit [email or web address].     Disposition/Instructions    Home care recommended. Follow home care protocol based instructions.    Thank you for taking steps to prevent the spread of this virus.  o Limit your contact " with others.  o Wear a simple mask to cover your cough.  o Wash your hands well and often.    Resources    [facility]: About COVID-19: [email or web address]    CDC: What to Do If You're Sick: www.cdc.gov/coronavirus/2019-ncov/about/steps-when-sick.html    CDC: Ending Home Isolation: www.cdc.gov/coronavirus/2019-ncov/hcp/disposition-in-home-patients.html     CDC: Caring for Someone: www.cdc.gov/coronavirus/2019-ncov/if-you-are-sick/care-for-someone.html     [facility]: Interim Guidance for Hospital Discharge to Home: www.health.[facility].[place]/diseases/coronavirus/hcp/hospdischarge.pdf    [facility] clinical trials (COVID-19 research studies): clinicalaffairs.[facility]-clinical-trials     Below are the COVID-19 hotlines at the [place] Department of Health ([facility]). Interpreters are available.   o For health questions: Call 073-683-1791 or 1-457.841.6011 (7 a.m. to 7 p.m.)  o For questions about schools and childcare: Call 359-501-8680 or 1-872.740.1051 (7 a.m. to 7 p.m.)     Reason for Disposition    [1] SEVERE diarrhea (e.g., 7 or more times / day more than normal) AND [2] age > 60 years    Additional Information    Negative: Shock suspected (e.g., cold/pale/clammy skin, too weak to stand, low BP, rapid pulse)    Negative: Difficult to awaken or acting confused (e.g., disoriented, slurred speech)    Negative: Sounds like a life-threatening emergency to the triager    Negative: Vomiting also present and worse than the diarrhea    Negative: [1] Blood in stool AND [2] without diarrhea    Negative: Diarrhea in a cancer patient who is currently (or recently) receiving chemotherapy or radiation therapy, or cancer patient who has metastatic or end-stage cancer and is receiving palliative care    Negative: [1] SEVERE abdominal pain (e.g., excruciating) AND [2] present > 1 hour    Negative: [1] SEVERE abdominal pain AND [2] age > 60    Negative: [1] Blood in the stool AND [2] moderate or large amount of  blood    Negative: Black or tarry bowel movements  (Exception: chronic-unchanged  black-grey bowel movements AND is taking iron pills or Pepto-bismol)    Negative: [1] Drinking very little AND [2] dehydration suspected (e.g., no urine > 12 hours, very dry mouth, very lightheaded)    Negative: Patient sounds very sick or weak to the triager    Protocols used: DIARRHEA-A-AH       [[id number]],[[id number]] [[id number]],[[id number]],[[id number]],[[id number]]

## 2024-05-20 ENCOUNTER — DOCUMENTATION ONLY (OUTPATIENT)
Dept: OTHER | Facility: CLINIC | Age: 64
End: 2024-05-20
Payer: MEDICARE

## 2024-05-21 ENCOUNTER — LAB (OUTPATIENT)
Dept: LAB | Facility: CLINIC | Age: 64
End: 2024-05-21
Payer: MEDICARE

## 2024-05-21 ENCOUNTER — ANTICOAGULATION THERAPY VISIT (OUTPATIENT)
Dept: ANTICOAGULATION | Facility: CLINIC | Age: 64
End: 2024-05-21

## 2024-05-21 DIAGNOSIS — D68.62 LUPUS ANTICOAGULANT DISORDER (H): Primary | ICD-10-CM

## 2024-05-21 DIAGNOSIS — D68.62 LUPUS ANTICOAGULANT DISORDER (H): ICD-10-CM

## 2024-05-21 DIAGNOSIS — D73.5 SPLENIC INFARCT: ICD-10-CM

## 2024-05-21 LAB — INR BLD: 2.6 (ref 0.9–1.1)

## 2024-05-21 PROCEDURE — 36416 COLLJ CAPILLARY BLOOD SPEC: CPT

## 2024-05-21 PROCEDURE — 85610 PROTHROMBIN TIME: CPT

## 2024-05-21 NOTE — PROGRESS NOTES
ANTICOAGULATION MANAGEMENT     Silvia Martinez 64 year old female is on warfarin with therapeutic INR result. (Goal INR 2.0-3.0)    Recent labs: (last 7 days)     05/21/24  1004   INR 2.6*       ASSESSMENT     Warfarin Lab Questionnaire    Warfarin Doses Last 7 Days      5/20/2024     9:59 AM   Dose in Tablet or Mg   TAB or MG? milligram (mg)     Pt Rptd Dose SUNDAY MONDAY TUESDAY WED THURS FRIDAY SATURDAY 5/20/2024   9:59 AM 5 5 5 5 5 5 5         5/20/2024   Warfarin Lab Questionnaire   Missed doses within past 14 days? No   Changes in diet or alcohol within past 14 days? Yes   Please explain:  Was on a trip to [place]. Eating differently.   Medication changes since last result? No   Injuries or illness since last result? No   New shortness of breath, severe headaches or sudden changes in vision since last result? No   Abnormal bleeding since last result? No   Upcoming surgery, procedure? No   Best number to call with results? [phone]     Previous result: Therapeutic last 2(+) visits  Additional findings: None       PLAN     Recommended plan for no diet, medication or health factor changes affecting INR     Dosing Instructions: Continue your current warfarin dose with next INR in 6 weeks       Summary  As of 5/21/2024      Full warfarin instructions:  5 mg every day   Next INR check:  7/2/2024               Detailed voice message left for Silvia with dosing instructions and follow up date.     Contact [phone] to schedule and with any changes, questions or concerns.     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

## 2024-07-01 ENCOUNTER — ANTICOAGULATION THERAPY VISIT (OUTPATIENT)
Dept: ANTICOAGULATION | Facility: CLINIC | Age: 64
End: 2024-07-01

## 2024-07-01 ENCOUNTER — LAB (OUTPATIENT)
Dept: LAB | Facility: CLINIC | Age: 64
End: 2024-07-01
Payer: MEDICARE

## 2024-07-01 DIAGNOSIS — D73.5 SPLENIC INFARCT: ICD-10-CM

## 2024-07-01 DIAGNOSIS — D68.62 LUPUS ANTICOAGULANT DISORDER (H): ICD-10-CM

## 2024-07-01 DIAGNOSIS — D68.62 LUPUS ANTICOAGULANT DISORDER (H): Primary | ICD-10-CM

## 2024-07-01 LAB — INR BLD: 2.5 (ref 0.9–1.1)

## 2024-07-01 PROCEDURE — 85610 PROTHROMBIN TIME: CPT

## 2024-07-01 PROCEDURE — 36416 COLLJ CAPILLARY BLOOD SPEC: CPT

## 2024-07-01 NOTE — PROGRESS NOTES
ANTICOAGULATION MANAGEMENT     Silvia Martinez 64 year old female is on warfarin with therapeutic INR result. (Goal INR 2.0-3.0)    Recent labs: (last 7 days)     07/01/24  1013   INR 2.5*       ASSESSMENT     Warfarin Lab Questionnaire    Warfarin Doses Last 7 Days      6/30/2024     3:56 PM   Dose in Tablet or Mg   TAB or MG? milligram (mg)     Pt Rptd Dose SUNDAY MONDAY TUESDAY WED THURS FRIDAY SATURDAY 6/30/2024   3:56 PM 5 5 5 5 5 5 5         6/30/2024   Warfarin Lab Questionnaire   Missed doses within past 14 days? No   Changes in diet or alcohol within past 14 days? No   Medication changes since last result? Yes   Please list: Omeprazole prn   Injuries or illness since last result? No   New shortness of breath, severe headaches or sudden changes in vision since last result? No   Abnormal bleeding since last result? No   Upcoming surgery, procedure? No   Best number to call with results? [phone]        Previous result: Therapeutic last 2(+) visits  Additional findings: None       PLAN     Recommended plan for no diet, medication or health factor changes affecting INR     Dosing Instructions: Continue your current warfarin dose with next INR in 6 weeks       Summary  As of 7/1/2024      Full warfarin instructions:  5 mg every day   Next INR check:  8/12/2024               Detailed voice message left for iSlvia with dosing instructions and follow up date.   Sent [employer] message with dosing and follow up instructions    Contact [phone]  to schedule and with any changes, questions or concerns.     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

## 2024-07-29 ENCOUNTER — NURSE TRIAGE (OUTPATIENT)
Dept: FAMILY MEDICINE | Facility: CLINIC | Age: 64
End: 2024-07-29
Payer: MEDICARE

## 2024-07-29 NOTE — TELEPHONE ENCOUNTER
S-(situation): Spoke with patient.    B-(background): 2/3 weeks ago, had sinus fullness. Felt better after OTC medications. Now has returned in the last few days.  At the dentist today. They did say that her sinus's are full.    A-(assessment): Congestion, teeth pain, mucus. Has tried nasal spray, antihistamines. Denies fever, sob, chest pain.     R-(recommendations): Requesting that provider just send in antibiotics without visit. She states that she cannot come into clinic. RN offered telephone visit and she was agreeable. Scheduled for 7/30 with PCP.   Advised UC if worsening.       Reason for Disposition   Sinus congestion (pressure, fullness) present > 10 days    Additional Information   Negative: Sounds like a life-threatening emergency to the triager   Negative: Difficulty breathing, and not from stuffy nose (e.g., not relieved by cleaning out the nose)   Negative: SEVERE headache and has fever   Negative: Patient sounds very sick or weak to the triager   Negative: SEVERE sinus pain   Negative: SEVERE headache   Negative: Redness or swelling on the cheek, forehead, or around the eye   Negative: Fever > 103 F (39.4 C)   Negative: Fever > 101 F (38.3 C) and over 60 years of age   Negative: Fever > 100.0 F (37.8 C) and has diabetes mellitus or a weak immune system (e.g., HIV positive, cancer chemotherapy, organ transplant, splenectomy, chronic steroids)   Negative: Fever > 100.0 F (37.8 C) and bedridden (e.g., CVA, chronic illness, recovering from surgery)   Negative: Fever present > 3 days (72 hours)   Negative: Fever returns after gone for over 24 hours and symptoms worse or not improved   Negative: Sinus pain (not just congestion) and fever   Negative: Earache    Protocols used: Sinus Pain or Congestion-A-[place]

## 2024-07-31 ENCOUNTER — TELEPHONE (OUTPATIENT)
Dept: FAMILY MEDICINE | Facility: CLINIC | Age: 64
End: 2024-07-31
Payer: MEDICARE

## 2024-07-31 DIAGNOSIS — D68.62 LUPUS ANTICOAGULANT DISORDER (H): Primary | ICD-10-CM

## 2024-07-31 NOTE — TELEPHONE ENCOUNTER
Spoke with Silvia and she started Augmentin last night for sinus infection. She has been on this many times with no interaction. She did still agree to check INR next week instead already scheduled INR in two weeks.

## 2024-07-31 NOTE — TELEPHONE ENCOUNTER
Patient Returning Call    Reason for call:  Patient calling in to speak with one of the anticoagulation nurses regarding a question. Please call.    Information relayed to patient:  message placed, await call back    Patient has additional questions:  No      Could we send this information to you in [employer] or would you prefer to receive a phone call?:   Patient would prefer a phone call   Okay to leave a detailed message?: Yes at Cell number on file:    Telephone Information:   Mobile [phone]

## 2024-08-05 ENCOUNTER — TRANSFERRED RECORDS (OUTPATIENT)
Dept: HEALTH INFORMATION MANAGEMENT | Facility: CLINIC | Age: 64
End: 2024-08-05
Payer: MEDICARE

## 2024-08-08 ENCOUNTER — LAB (OUTPATIENT)
Dept: LAB | Facility: CLINIC | Age: 64
End: 2024-08-08
Payer: MEDICARE

## 2024-08-08 ENCOUNTER — ANTICOAGULATION THERAPY VISIT (OUTPATIENT)
Dept: ANTICOAGULATION | Facility: CLINIC | Age: 64
End: 2024-08-08

## 2024-08-08 DIAGNOSIS — D68.62 LUPUS ANTICOAGULANT DISORDER (H): ICD-10-CM

## 2024-08-08 DIAGNOSIS — D68.62 LUPUS ANTICOAGULANT DISORDER (H): Primary | ICD-10-CM

## 2024-08-08 DIAGNOSIS — D73.5 SPLENIC INFARCT: ICD-10-CM

## 2024-08-08 LAB — INR BLD: 2.3 (ref 0.9–1.1)

## 2024-08-08 PROCEDURE — 36416 COLLJ CAPILLARY BLOOD SPEC: CPT

## 2024-08-08 PROCEDURE — 85610 PROTHROMBIN TIME: CPT | Mod: GZ

## 2024-08-08 NOTE — PROGRESS NOTES
ANTICOAGULATION MANAGEMENT     Silvia Martinez 64 year old female is on warfarin with therapeutic INR result. (Goal INR 2.0-3.0)    Recent labs: (last 7 days)     08/08/24  0842   INR 2.3*       ASSESSMENT     Source(s): Chart Review and Patient/Caregiver Call     Warfarin doses taken: Warfarin taken as instructed  Diet: No new diet changes identified  Medication/supplement changes: None noted  New illness, injury, or hospitalization: No  Signs or symptoms of bleeding or clotting: No  Previous result: Therapeutic last 2(+) visits  Additional findings: None       PLAN     Recommended plan for no diet, medication or health factor changes affecting INR     Dosing Instructions: Continue your current warfarin dose with next INR in 6 weeks       Summary  As of 8/8/2024      Full warfarin instructions:  5 mg every day   Next INR check:  9/19/2024               Detailed voice message left for Silvia with dosing instructions and follow up date.     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

## 2024-09-18 ENCOUNTER — ANTICOAGULATION THERAPY VISIT (OUTPATIENT)
Dept: ANTICOAGULATION | Facility: CLINIC | Age: 64
End: 2024-09-18

## 2024-09-18 ENCOUNTER — LAB (OUTPATIENT)
Dept: LAB | Facility: CLINIC | Age: 64
End: 2024-09-18
Payer: MEDICARE

## 2024-09-18 ENCOUNTER — DOCUMENTATION ONLY (OUTPATIENT)
Dept: ANTICOAGULATION | Facility: CLINIC | Age: 64
End: 2024-09-18

## 2024-09-18 DIAGNOSIS — D73.5 SPLENIC INFARCT: ICD-10-CM

## 2024-09-18 DIAGNOSIS — D68.62 LUPUS ANTICOAGULANT DISORDER (H): Primary | ICD-10-CM

## 2024-09-18 DIAGNOSIS — D68.62 LUPUS ANTICOAGULANT DISORDER (H): ICD-10-CM

## 2024-09-18 LAB — INR BLD: 2.6 (ref 0.9–1.1)

## 2024-09-18 PROCEDURE — 36416 COLLJ CAPILLARY BLOOD SPEC: CPT

## 2024-09-18 PROCEDURE — 85610 PROTHROMBIN TIME: CPT

## 2024-09-18 NOTE — PROGRESS NOTES
ANTICOAGULATION CLINIC REFERRAL RENEWAL REQUEST       An annual renewal order is required for all patients referred to [facility].?  Please review and sign the pended referral order for Silvia Martinez.       ANTICOAGULATION SUMMARY      Warfarin indication(s)   Lupus Anticoagulant and splenic infarct    Mechanical heart valve present?  NO       Current goal range   INR: 2.0-3.0     Goal appropriate for indication? Goal INR 2-3, standard for indication(s) above     Time in Therapeutic Range (TTR)  (Goal > 60%) 94.1%       Office visit with referring provider's group within last year yes on 7/30/24       Mechelle Cox RN  [facility]

## 2024-09-18 NOTE — PROGRESS NOTES
ANTICOAGULATION MANAGEMENT     Silvia Martinez 64 year old female is on warfarin with therapeutic INR result. (Goal INR 2.0-3.0)    Recent labs: (last 7 days)     09/18/24  0852   INR 2.6*       ASSESSMENT     Source(s): Chart Review and Patient/Caregiver Call     Warfarin doses taken: Warfarin taken as instructed  Diet: No new diet changes identified  Medication/supplement changes: None noted  New illness, injury, or hospitalization: No  Signs or symptoms of bleeding or clotting: No  Previous result: Therapeutic last 2(+) visits  Additional findings: None       PLAN     Recommended plan for no diet, medication or health factor changes affecting INR     Dosing Instructions: Continue your current warfarin dose with next INR in 6 weeks       Summary  As of 9/18/2024      Full warfarin instructions:  5 mg every day   Next INR check:  10/30/2024               Telephone call with Silvia who agrees to plan and repeated back plan correctly    Lab visit scheduled    Education provided: Please call back if any changes to your diet, medications or how you've been taking warfarin  Goal range and lab monitoring: goal range and significance of current result and Importance of therapeutic range  Contact [phone] with any changes, questions or concerns.     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

## 2024-10-08 ENCOUNTER — TRANSFERRED RECORDS (OUTPATIENT)
Dept: HEALTH INFORMATION MANAGEMENT | Facility: CLINIC | Age: 64
End: 2024-10-08
Payer: MEDICARE

## 2024-10-14 ENCOUNTER — TRANSFERRED RECORDS (OUTPATIENT)
Dept: HEALTH INFORMATION MANAGEMENT | Facility: CLINIC | Age: 64
End: 2024-10-14
Payer: MEDICARE

## 2024-10-16 ENCOUNTER — TELEPHONE (OUTPATIENT)
Dept: FAMILY MEDICINE | Facility: CLINIC | Age: 64
End: 2024-10-16
Payer: MEDICARE

## 2024-10-16 NOTE — TELEPHONE ENCOUNTER
See Previous message(s) about Medicare forms for diabetic supplies.  Pharmacy called to clarify which forms.  Pharmacy will process RX request under Medicare Part B to process..

## 2024-10-29 ENCOUNTER — ANTICOAGULATION THERAPY VISIT (OUTPATIENT)
Dept: ANTICOAGULATION | Facility: CLINIC | Age: 64
End: 2024-10-29

## 2024-10-29 ENCOUNTER — LAB (OUTPATIENT)
Dept: LAB | Facility: CLINIC | Age: 64
End: 2024-10-29
Payer: MEDICARE

## 2024-10-29 DIAGNOSIS — D73.5 SPLENIC INFARCT: ICD-10-CM

## 2024-10-29 DIAGNOSIS — D68.62 LUPUS ANTICOAGULANT DISORDER (H): Primary | ICD-10-CM

## 2024-10-29 DIAGNOSIS — D68.62 LUPUS ANTICOAGULANT DISORDER (H): ICD-10-CM

## 2024-10-29 LAB — INR BLD: 2.3 (ref 0.9–1.1)

## 2024-10-29 PROCEDURE — 85610 PROTHROMBIN TIME: CPT

## 2024-10-29 PROCEDURE — 36416 COLLJ CAPILLARY BLOOD SPEC: CPT

## 2024-10-29 NOTE — PROGRESS NOTES
ANTICOAGULATION MANAGEMENT     Silvia Martinez 64 year old female is on warfarin with therapeutic INR result. (Goal INR 2.0-3.0)    Recent labs: (last 7 days)     10/29/24  1422   INR 2.3*       ASSESSMENT     Source(s): Chart Review and Patient/Caregiver Call     Warfarin doses taken: Warfarin taken as instructed  Diet: No new diet changes identified  Medication/supplement changes: None noted  New illness, injury, or hospitalization: No  Signs or symptoms of bleeding or clotting: No  Previous result: Therapeutic last 2(+) visits  Additional findings: None       PLAN     Recommended plan for no diet, medication or health factor changes affecting INR     Dosing Instructions: Continue your current warfarin dose with next INR in 6 weeks       Summary  As of 10/29/2024      Full warfarin instructions:  5 mg every day   Next INR check:  12/12/2024               Telephone call with Silvia who verbalizes understanding and agrees to plan    Lab visit scheduled    Education provided: Goal range and lab monitoring: goal range and significance of current result    Plan made per [facility] anticoagulation protocol    Lavern Porras RN  10/29/2024  Anticoagulation Clinic  [employer] for routing messages: seven CALVO  [facility] patient phone line: [phone]        _______________________________________________________________________     Anticoagulation Episode Summary       Current INR goal:  2.0-3.0   TTR:  94.1% (1 y)   Target end date:  Indefinite   Send INR reminders to:  FELIBERTO CALVO    Indications    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

## 2024-11-06 ENCOUNTER — DOCUMENTATION ONLY (OUTPATIENT)
Dept: OTHER | Facility: CLINIC | Age: 64
End: 2024-11-06
Payer: MEDICARE

## 2024-12-10 ENCOUNTER — MYC MEDICAL ADVICE (OUTPATIENT)
Dept: FAMILY MEDICINE | Facility: CLINIC | Age: 64
End: 2024-12-10
Payer: MEDICARE

## 2024-12-12 ENCOUNTER — ANTICOAGULATION THERAPY VISIT (OUTPATIENT)
Dept: ANTICOAGULATION | Facility: CLINIC | Age: 64
End: 2024-12-12

## 2024-12-12 ENCOUNTER — LAB (OUTPATIENT)
Dept: LAB | Facility: CLINIC | Age: 64
End: 2024-12-12
Payer: MEDICARE

## 2024-12-12 DIAGNOSIS — D73.5 SPLENIC INFARCT: ICD-10-CM

## 2024-12-12 DIAGNOSIS — D68.62 LUPUS ANTICOAGULANT DISORDER (H): Primary | ICD-10-CM

## 2024-12-12 DIAGNOSIS — D68.62 LUPUS ANTICOAGULANT DISORDER (H): ICD-10-CM

## 2024-12-12 LAB — INR BLD: 2.4 (ref 0.9–1.1)

## 2024-12-12 NOTE — PROGRESS NOTES
ANTICOAGULATION MANAGEMENT     Silvia Patelmichelle 64 year old female is on warfarin with therapeutic INR result. (Goal INR 2.0-3.0)    Recent labs: (last 7 days)     12/12/24  1025   INR 2.4*       ASSESSMENT     Warfarin Lab Questionnaire    Warfarin Doses Last 7 Days      12/12/2024    10:18 AM   Dose in Tablet or Mg   TAB or MG? milligram (mg)     Pt Rptd Dose SUNDAY MONDAY TUESDAY WED THURS FRIDAY SATURDAY 12/12/2024  10:18 AM 5 5 5 5 5 5 5         12/12/2024   Warfarin Lab Questionnaire   Missed doses within past 14 days? No   Changes in diet or alcohol within past 14 days? No   Medication changes since last result? Yes   Please list: 12/6/24 Augmentin for 7 days ending tomorrow   Injuries or illness since last result? Yes   If yes, please explain: sinus infection   New shortness of breath, severe headaches or sudden changes in vision since last result? No   Abnormal bleeding since last result? No   Upcoming surgery, procedure? No   Best number to call with results? [phone]        Previous result: Therapeutic last 2(+) visits  Additional findings: None       PLAN     Recommended plan for temporary change(s) affecting INR     Dosing Instructions: Continue your current warfarin dose with next INR in 6 weeks       Summary  As of 12/12/2024      Full warfarin instructions:  5 mg every day   Next INR check:  1/23/2025               Telephone call with Silvia who verbalizes understanding and agrees to plan    Lab visit scheduled    Education provided: Goal range and lab monitoring: goal range and significance of current result  Contact [phone] with any changes, questions or concerns.     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

## 2025-01-20 ENCOUNTER — TRANSFERRED RECORDS (OUTPATIENT)
Dept: HEALTH INFORMATION MANAGEMENT | Facility: CLINIC | Age: 65
End: 2025-01-20
Payer: MEDICARE

## 2025-01-23 ENCOUNTER — LAB (OUTPATIENT)
Dept: LAB | Facility: CLINIC | Age: 65
End: 2025-01-23
Payer: MEDICARE

## 2025-01-23 ENCOUNTER — ANTICOAGULATION THERAPY VISIT (OUTPATIENT)
Dept: ANTICOAGULATION | Facility: CLINIC | Age: 65
End: 2025-01-23

## 2025-01-23 DIAGNOSIS — D73.5 SPLENIC INFARCT: ICD-10-CM

## 2025-01-23 DIAGNOSIS — D68.62 LUPUS ANTICOAGULANT DISORDER: ICD-10-CM

## 2025-01-23 DIAGNOSIS — D68.62 LUPUS ANTICOAGULANT DISORDER: Primary | ICD-10-CM

## 2025-01-23 LAB — INR BLD: 2.3 (ref 0.9–1.1)

## 2025-01-23 NOTE — PROGRESS NOTES
ANTICOAGULATION MANAGEMENT     Silvia Martinez 64 year old female is on warfarin with therapeutic INR result. (Goal INR 2.0-3.0)    Recent labs: (last 7 days)     01/23/25  0930   INR 2.3*       ASSESSMENT     Warfarin Lab Questionnaire    Warfarin Doses Last 7 Days      1/22/2025    11:29 AM   Dose in Tablet or Mg   TAB or MG? milligram (mg)     Pt Rptd Dose SUNDAY MONDAY TUESDAY WED THURS FRIDAY SATURDAY 1/22/2025  11:29 AM 5 5 5 5 5 5 5         1/22/2025   Warfarin Lab Questionnaire   Missed doses within past 14 days? No   Changes in diet or alcohol within past 14 days? No   Medication changes since last result? No   Injuries or illness since last result? Yes   If yes, please explain: Sinus issues-she is going to try the sinus rinse and see if that helps. She does not want to take more antibiotics.    New shortness of breath, severe headaches or sudden changes in vision since last result? No   Abnormal bleeding since last result? No   Upcoming surgery, procedure? No   Best number to call with results? [phone]     Previous result: Therapeutic last 2(+) visits  Additional findings: None       PLAN     Recommended plan for no diet, medication or health factor changes affecting INR     Dosing Instructions: Continue your current warfarin dose with next INR in 6 weeks       Summary  As of 1/23/2025      Full warfarin instructions:  5 mg every day   Next INR check:  3/6/2025               Telephone call with Silvia who agrees to plan and repeated back plan correctly    She already scheduled.    Education provided: Please call back if any changes to your diet, medications or how you've been taking warfarin  Goal range and lab monitoring: goal range and significance of current result and Importance of therapeutic range  Importance of notifying anticoagulation clinic for: changes in medications; a sooner lab recheck maybe needed  Contact [phone] with any changes, questions or concerns.     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

## 2025-01-27 ENCOUNTER — E-VISIT (OUTPATIENT)
Dept: FAMILY MEDICINE | Facility: CLINIC | Age: 65
End: 2025-01-27
Payer: MEDICARE

## 2025-01-27 DIAGNOSIS — J01.90 ACUTE SINUSITIS WITH SYMPTOMS > 10 DAYS: Primary | ICD-10-CM

## 2025-03-03 ENCOUNTER — LAB (OUTPATIENT)
Dept: LAB | Facility: CLINIC | Age: 65
End: 2025-03-03
Payer: MEDICARE

## 2025-03-03 ENCOUNTER — ANTICOAGULATION THERAPY VISIT (OUTPATIENT)
Dept: ANTICOAGULATION | Facility: CLINIC | Age: 65
End: 2025-03-03

## 2025-03-03 DIAGNOSIS — D68.62 LUPUS ANTICOAGULANT DISORDER: Primary | ICD-10-CM

## 2025-03-03 DIAGNOSIS — D68.62 LUPUS ANTICOAGULANT DISORDER: ICD-10-CM

## 2025-03-03 DIAGNOSIS — D73.5 SPLENIC INFARCT: ICD-10-CM

## 2025-03-03 LAB — INR BLD: 2.1 (ref 0.9–1.1)

## 2025-03-03 PROCEDURE — 85610 PROTHROMBIN TIME: CPT

## 2025-03-03 PROCEDURE — 36416 COLLJ CAPILLARY BLOOD SPEC: CPT

## 2025-03-03 NOTE — PROGRESS NOTES
ANTICOAGULATION MANAGEMENT     Silviamichelle Martinez 64 year old female is on warfarin with therapeutic INR result. (Goal INR 2.0-3.0)    Recent labs: (last 7 days)     03/03/25  1102   INR 2.1*       ASSESSMENT     Warfarin Lab Questionnaire    Warfarin Doses Last 7 Days      3/2/2025     9:00 AM   Dose in Tablet or Mg   TAB or MG? milligram (mg)     Pt Rptd Dose KEILA MONDAY TUESDAY WED THURS FRIDAY SATURDAY   3/2/2025   9:00 AM 5 5 5 5 5 5 5         3/2/2025   Warfarin Lab Questionnaire   Missed doses within past 14 days? No   Changes in diet or alcohol within past 14 days? No   Medication changes since last result? No   Injuries or illness since last result? Yes   If yes, please explain: Chronic sinus   New shortness of breath, severe headaches or sudden changes in vision since last result? No   Abnormal bleeding since last result? No   Upcoming surgery, procedure? No   Best number to call with results? [phone]     Previous result: Therapeutic last 2(+) visits  Additional findings: None       PLAN     Recommended plan for no diet, medication or health factor changes affecting INR     Dosing Instructions: Continue your current warfarin dose with next INR in 6 weeks       Summary  As of 3/3/2025      Full warfarin instructions:  5 mg every day   Next INR check:  4/14/2025               Telephone call with Silvia who agrees to plan and repeated back plan correctly    Lab visit scheduled    Education provided: Please call back if any changes to your diet, medications or how you've been taking warfarin  Goal range and lab monitoring: goal range and significance of current result and Importance of therapeutic range  Importance of notifying anticoagulation clinic for: changes in medications; a sooner lab recheck maybe needed  Contact [phone] with any changes, questions or concerns.     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

## 2025-03-16 ENCOUNTER — HEALTH MAINTENANCE LETTER (OUTPATIENT)
Age: 65
End: 2025-03-16

## 2025-03-21 SDOH — HEALTH STABILITY: PHYSICAL HEALTH: ON AVERAGE, HOW MANY DAYS PER WEEK DO YOU ENGAGE IN MODERATE TO STRENUOUS EXERCISE (LIKE A BRISK WALK)?: 2 DAYS

## 2025-03-21 SDOH — HEALTH STABILITY: PHYSICAL HEALTH: ON AVERAGE, HOW MANY MINUTES DO YOU ENGAGE IN EXERCISE AT THIS LEVEL?: 30 MIN

## 2025-03-21 ASSESSMENT — SOCIAL DETERMINANTS OF HEALTH (SDOH): HOW OFTEN DO YOU GET TOGETHER WITH FRIENDS OR RELATIVES?: TWICE A WEEK

## 2025-03-26 ENCOUNTER — OFFICE VISIT (OUTPATIENT)
Dept: FAMILY MEDICINE | Facility: CLINIC | Age: 65
End: 2025-03-26
Attending: FAMILY MEDICINE
Payer: MEDICARE

## 2025-03-26 VITALS
RESPIRATION RATE: 14 BRPM | HEART RATE: 78 BPM | SYSTOLIC BLOOD PRESSURE: 130 MMHG | WEIGHT: 200.5 LBS | OXYGEN SATURATION: 97 % | BODY MASS INDEX: 32.22 KG/M2 | DIASTOLIC BLOOD PRESSURE: 78 MMHG | HEIGHT: 66 IN

## 2025-03-26 DIAGNOSIS — Z00.00 WELCOME TO MEDICARE PREVENTIVE VISIT: Primary | ICD-10-CM

## 2025-03-26 DIAGNOSIS — N95.2 ATROPHIC VAGINITIS: ICD-10-CM

## 2025-03-26 DIAGNOSIS — D68.62 LUPUS ANTICOAGULANT DISORDER: ICD-10-CM

## 2025-03-26 DIAGNOSIS — E03.9 ACQUIRED HYPOTHYROIDISM: ICD-10-CM

## 2025-03-26 DIAGNOSIS — Z78.0 POST-MENOPAUSAL: ICD-10-CM

## 2025-03-26 DIAGNOSIS — Z79.01 CHRONIC ANTICOAGULATION: ICD-10-CM

## 2025-03-26 DIAGNOSIS — E55.9 VITAMIN D DEFICIENCY, UNSPECIFIED: ICD-10-CM

## 2025-03-26 DIAGNOSIS — Z23 ENCOUNTER FOR IMMUNIZATION: ICD-10-CM

## 2025-03-26 DIAGNOSIS — I77.3 ARTERIAL FIBROMUSCULAR DYSPLASIA: ICD-10-CM

## 2025-03-26 LAB
ALBUMIN SERPL BCG-MCNC: 4.3 G/DL (ref 3.5–5.2)
ALP SERPL-CCNC: 76 U/L (ref 40–150)
ALT SERPL W P-5'-P-CCNC: 63 U/L (ref 0–50)
ANION GAP SERPL CALCULATED.3IONS-SCNC: 9 MMOL/L (ref 7–15)
AST SERPL W P-5'-P-CCNC: 43 U/L (ref 0–45)
BILIRUB SERPL-MCNC: 0.5 MG/DL
BUN SERPL-MCNC: 14.7 MG/DL (ref 8–23)
CALCIUM SERPL-MCNC: 9.5 MG/DL (ref 8.8–10.4)
CHLORIDE SERPL-SCNC: 103 MMOL/L (ref 98–107)
CHOLEST SERPL-MCNC: 185 MG/DL
CREAT SERPL-MCNC: 0.78 MG/DL (ref 0.51–0.95)
EGFRCR SERPLBLD CKD-EPI 2021: 84 ML/MIN/1.73M2
EST. AVERAGE GLUCOSE BLD GHB EST-MCNC: 108 MG/DL
FASTING STATUS PATIENT QL REPORTED: YES
FASTING STATUS PATIENT QL REPORTED: YES
GLUCOSE SERPL-MCNC: 101 MG/DL (ref 70–99)
HBA1C MFR BLD: 5.4 % (ref 0–5.6)
HCO3 SERPL-SCNC: 25 MMOL/L (ref 22–29)
HDLC SERPL-MCNC: 57 MG/DL
HGB BLD-MCNC: 14.7 G/DL (ref 11.7–15.7)
LDLC SERPL CALC-MCNC: 116 MG/DL
NONHDLC SERPL-MCNC: 128 MG/DL
POTASSIUM SERPL-SCNC: 4.1 MMOL/L (ref 3.4–5.3)
PROT SERPL-MCNC: 7.5 G/DL (ref 6.4–8.3)
SODIUM SERPL-SCNC: 137 MMOL/L (ref 135–145)
TRIGL SERPL-MCNC: 58 MG/DL
TSH SERPL DL<=0.005 MIU/L-ACNC: 1.34 UIU/ML (ref 0.3–4.2)
VIT D+METAB SERPL-MCNC: 30 NG/ML (ref 20–50)

## 2025-03-26 PROCEDURE — 36415 COLL VENOUS BLD VENIPUNCTURE: CPT | Performed by: FAMILY MEDICINE

## 2025-03-26 PROCEDURE — 80061 LIPID PANEL: CPT | Performed by: FAMILY MEDICINE

## 2025-03-26 PROCEDURE — 83036 HEMOGLOBIN GLYCOSYLATED A1C: CPT | Mod: GZ | Performed by: FAMILY MEDICINE

## 2025-03-26 PROCEDURE — G0009 ADMIN PNEUMOCOCCAL VACCINE: HCPCS | Performed by: FAMILY MEDICINE

## 2025-03-26 PROCEDURE — 82306 VITAMIN D 25 HYDROXY: CPT | Performed by: FAMILY MEDICINE

## 2025-03-26 PROCEDURE — 3075F SYST BP GE 130 - 139MM HG: CPT | Performed by: FAMILY MEDICINE

## 2025-03-26 PROCEDURE — 3078F DIAST BP <80 MM HG: CPT | Performed by: FAMILY MEDICINE

## 2025-03-26 PROCEDURE — G0404 EKG TRACING FOR INITIAL PREV: HCPCS | Performed by: FAMILY MEDICINE

## 2025-03-26 PROCEDURE — 85018 HEMOGLOBIN: CPT | Performed by: FAMILY MEDICINE

## 2025-03-26 PROCEDURE — 99214 OFFICE O/P EST MOD 30 MIN: CPT | Mod: 25 | Performed by: FAMILY MEDICINE

## 2025-03-26 PROCEDURE — G0402 INITIAL PREVENTIVE EXAM: HCPCS | Performed by: FAMILY MEDICINE

## 2025-03-26 PROCEDURE — 84443 ASSAY THYROID STIM HORMONE: CPT | Performed by: FAMILY MEDICINE

## 2025-03-26 PROCEDURE — 90677 PCV20 VACCINE IM: CPT | Performed by: FAMILY MEDICINE

## 2025-03-26 PROCEDURE — 80053 COMPREHEN METABOLIC PANEL: CPT | Performed by: FAMILY MEDICINE

## 2025-03-26 PROCEDURE — G2211 COMPLEX E/M VISIT ADD ON: HCPCS | Performed by: FAMILY MEDICINE

## 2025-03-26 RX ORDER — WARFARIN SODIUM 5 MG/1
5 TABLET ORAL DAILY
Qty: 90 TABLET | Refills: 3 | Status: SHIPPED | OUTPATIENT
Start: 2025-03-26

## 2025-03-26 RX ORDER — LEVOTHYROXINE SODIUM 100 UG/1
100 TABLET ORAL DAILY
Qty: 90 TABLET | Refills: 3 | Status: SHIPPED | OUTPATIENT
Start: 2025-03-26

## 2025-03-26 RX ORDER — ESTRADIOL 0.1 MG/G
2 CREAM VAGINAL
Qty: 42.5 G | Refills: 4 | Status: SHIPPED | OUTPATIENT
Start: 2025-03-27

## 2025-03-26 NOTE — PATIENT INSTRUCTIONS
"Dr Joy Menchaca, hormone balance through different ways of intermittent fasting/nutritional support  Fast Like a Girl  The Menopause Reset    Dr Gabrielle Luis, Hormone health through the [place] Diet  [email or web address]    Dr Danika Fagan, hormone balance  The Hormone Cure     Dr. Renuka Brown  How to Loose Weight for the Last Time: [employer] for Permanent Weight Loss  Also her Podcast: Weight loss for Busy Physicians *   *just insert your own career/barriers with any reference to busy physician world and you will still get phenomenal tips for the mindset changes needed for sustained weight loss.      Podcast: Hit Play Not Pause      Weight Loss Strategies for Success: (start small, pick 1 or 2 goals each week)    Stop snacking. Three meals a day only. This is THE MAJOR  for insulin resistance. We need to change the insulin resistance to have sustained weight loss.   Keep a food log- apps like [employer] are very helpful for this. (Not to pay attention to the calories but just to track the food).   Plan your food log in advance- Night prior: pick out what you want to eat for the next day for your 3 meals and stick to it: protein, healthy fat, veggie for example at every meal.  High protein (100g/day): Try to get protein in at least every 3.5 hours during the day to avoid a hunger surge late in the day.  If hungry, start with a protein serving, followed by water and then a crunchy vegetable that requires chewing (this decreases the hunger hormone).   Use plenty of healthy fats (olive oil, avocados, nuts) when cooking which will make you feel more satisfied.    Be intentional and think about what you are eating and feel the food fuel you.  Have a  \"Table, chair, plate\" with every meal. Sit down to eat your food!  Brush your teeth after the last meal of the day. Prevents evening snacking.   Avoid sugary beverages. (pop, fancy coffees). Reduce alcohol.   Drink " "water instead.   No fast food (or reduce meals out to a specific #/week). Eat at home 90% of the time.  Start the morning with breakfast.  Choose one day to be \"meal planning\" and/or \"meal prep\" days. Plan ahead for grocery shopping for healthy food choices, and spend 30min-1hr each week prepping your fruits/veggies (wash, chop, store in easy grab portions).  Make it easy to grab the protein (cut up chicken breasts, greek yogurt containers, hard boiled eggs, etc)  Start low intensity exercise 30 minutes/day (walking/hiking/yard work).  Goal for 10,000 steps per day (consider a FitBit or other tracking device).  Daily weights can help- but just use it as a data point; don't get stuck in the thoughts around that number.       Protein Choices  Plant-Based Proteins  Plant-based protein foods provide quality protein, healthy fats, and fiber. They vary in how much fat, salt, and carbohydrate they contain, so make sure to read labels.  Beans such as black, kidney, and haynes   Bean products like baked beans and refried beans   Hummus and falafel   Lentils such as brown, green, or yellow   Peas such as black-eyed or split peas   Edamame   Soy nuts   Nuts and spreads like almond butter, cashew butter, or peanut butter   [place]peh, tofu   Products like meatless \"chicken\" nuggets, \"beef\" crumbles, \"burgers\", \"sandoval\", \"sausage\", and \"hot dogs\"  Fish and Seafood  Try to include fish at least 2 times per week.  Fish high in omega-3 fatty acids like [place] tuna, herring, mackerel, rainbow trout, sardines, and salmon   Other fish including catfish, cod, flounder, nati, halibut, orange roughy, and tilapia   Shellfish including clams, crab, imitation shellfish, lobster, scallops, shrimp, oysters.  Poultry  Choose poultry without the skin for less saturated fat and cholesterol.  Chicken, turkey, luh hen  Cheese and Eggs  Reduced-fat cheese or regular cheese in small amounts   Cottage cheese   Whole eggs  Game  [place], ostrich, " rabbit, venison   Dove, duck, goose, or pheasant (no skin)  Beef, Pork, Veal, Lamb  It s best to limit your intake of red meat which is often higher in saturated fat and processed meats like ham, sandoval and hot dogs which are often higher in saturated fat and sodium. If you decide to have these, choose the leanest options, which are:  Select or Choice grades of beef trimmed of fat including: christina, rib, rump roast, round, sirloin, cubed, flank, porterhouse, T-bone steak, tenderloin   Lamb: chop, leg, or roast   Veal: loin chop or roast   Pork: [place] sandoval, center loin chop, ham, tenderloin

## 2025-03-26 NOTE — PROGRESS NOTES
Preventive Care Visit  [facility]  Maite Mcallister MD, Family Medicine  Mar 26, 2025      Assessment & Plan     Welcome to Medicare preventive visit  - Home safety information was reviewed if pertinent for falls prevention: regular checkups with vision and hearing, mindful medication use margot with OTCs, using any assisted walking devices, adequate shoes and feet wear, avoiding loose rugs, safety additions to the home if needed, keeping the body in good shape with regular exercise especially walking, and limiting alcohol intake.  - Social history was reviewed  - Patient is independent with activities of daily living  - We reviewed active symptoms and discussed management  - We reviewed list of healthcare providers for this patient.  - We also reviewed PHQ 9    - Pneumococcal 20 Valent Conjugate (PCV20)  - EKG 12-lead for baseline  - Hemoglobin A1c; Future  - Hemoglobin; Future  - Lipid panel reflex to direct LDL Fasting; Future  - TSH; Future  - Comprehensive metabolic panel (BMP + Alb, Alk Phos, ALT, AST, Total. Bili, TP); Future    Acquired hypothyroidism  Stable, TSH due today; refill sent  - levothyroxine (SYNTHROID/LEVOTHROID) 100 MCG tablet; Take 1 tablet (100 mcg) by mouth daily     Atrophic vaginitis  Stable, refilled  - estradiol (ESTRACE) 0.1 MG/GM vaginal cream; Place 2 g vaginally twice a week     Chronic anticoagulation  Lupus anticoagulant disorder (H) with hx of splenic infarct  Arterial fibromuscular dysplasia (H24)  Doing well, INR goal 2-3; followed by [facility]. Has follow up in May. BP is well controlled (not on meds)  - warfarin refilled, appreciate AntiCoag team management       Post-menopausal  Due for DEXA age 64  - DX Bone Density; Future  - Vitamin D Deficiency      Vitamin D deficiency, unspecified  - Vitamin D Deficiency    Encounter for immunization  - Pneumococcal 20 Valent Conjugate (PCV20)      BMI  Estimated body mass index is 32.12 kg/m  as calculated  "from the following:    Height as of this encounter: 1.683 m (5' 6.25\").    Weight as of this encounter: 90.9 kg (200 lb 8 oz).   Weight management plan: Discussed healthy diet and exercise guidelines    Counseling  Appropriate preventive services were addressed with this patient via screening, questionnaire, or discussion as appropriate for fall prevention, nutrition, physical activity, Tobacco-use cessation, social engagement, weight loss and cognition.  Checklist reviewing preventive services available has been given to the patient.  Reviewed patient's diet, addressing concerns and/or questions.   She is at risk for lack of exercise and has been provided with information to increase physical activity for the benefit of her well-being.     Follow-up 1 year        Subjective   Silvia is a 64 year old, presenting for the following:  Annual Visit (Fasting)           HPI     Recent [place] trip with family which was wonderful to have all the grandchildren together for [employer] and [employer] trip/hiking etc.  This March is the 1 year anniversary of the death of her mother as well as her youngest grandchild who  at 5 weeks of age.   She is coping okay and has good strong family support as well as varghese.    Weight gain: Lost 15lbs last year with stress but gained back and snoring again now.   Tried mouth tape recently since she is a  mouth breather and feel it helps already  Would like to hold off on PRITESH testing for now  Working to get back into an exercise routine      MEDICAL CONDITIONS REVIEWED:    Atrophic vaginitis: Has vaginal estrace that she uses sparingly      Hypothyroidism: stable on Synthroid 100mcg daily; due for TSH today    Seeing [facility] vascular medicine Dr. Sexton for her conditions of:  Celiac artery dissection with splenic infarction  Fibromuscular dysplasia  Segmental arterial mediolysis  Lupus-like anticoagulant    Every other year imaging. Continues on coumadin which we manage through the " [facility] clinic locally    When she had her splenic infarction she was taken off the estrace pill but found not to be hormonal cause (was on it almost 20 years); on estrogen cream now  Wondering about bone health           Advance Care Planning  Patient has a Health Care Directive on file  Advance care planning document is on file and is current.      3/21/2025   General Health   How would you rate your overall physical health? Excellent   Feel stress (tense, anxious, or unable to sleep) Not at all         3/21/2025   Nutrition   Diet: Regular (no restrictions)         3/21/2025   Exercise   Days per week of moderate/strenous exercise 2 days   Average minutes spent exercising at this level 30 min   (!) EXERCISE CONCERN      3/21/2025   Social Factors   Frequency of gathering with friends or relatives Twice a week   Worry food won't last until get money to buy more No   Food not last or not have enough money for food? No   Do you have housing? (Housing is defined as stable permanent housing and does not include staying ouside in a car, in a tent, in an abandoned building, in an overnight shelter, or couch-surfing.) Yes   Are you worried about losing your housing? No   Lack of transportation? No   Unable to get utilities (heat,electricity)? No         3/21/2025   Fall Risk   Fallen 2 or more times in the past year? No   Trouble with walking or balance? No          3/21/2025   Activities of Daily Living- Home Safety   Needs help with the following daily activites None of the above   Safety concerns in the home None of the above         3/21/2025   Dental   Dentist two times every year? Yes         3/21/2025   Hearing Screening   Hearing concerns? None of the above         3/21/2025   Driving Risk Screening   Patient/family members have concerns about driving No         3/21/2025   General Alertness/Fatigue Screening   Have you been more tired than usual lately? No         3/21/2025   Urinary Incontinence Screening    Bothered by leaking urine in past 6 months No           4/12/2024   TB Screening   Were you born outside of the [place]? No           Today's PHQ-2 Score:       3/26/2025     8:07 AM   PHQ-2 ( 1999 Pfizer)   Q1: Little interest or pleasure in doing things 0   Q2: Feeling down, depressed or hopeless 0   PHQ-2 Score 0    Q1: Little interest or pleasure in doing things Not at all   Q2: Feeling down, depressed or hopeless Not at all   PHQ-2 Score 0       Patient-reported           3/21/2025   Substance Use   Alcohol more than 3/day or more than 7/wk No   Do you have a current opioid prescription? No   How severe/bad is pain from 1 to 10? 1/10   Do you use any other substances recreationally? No     Social History     Tobacco Use    Smoking status: Never    Smokeless tobacco: Never   Vaping Use    Vaping status: Never Used   Substance Use Topics    Alcohol use: No    Drug use: No           3/15/2023   LAST FHS-7 RESULTS   1st degree relative breast or ovarian cancer No   Any relative bilateral breast cancer No   Any male have breast cancer No   Any ONE woman have BOTH breast AND ovarian cancer Yes   Any woman with breast cancer before 50yrs No   2 or more relatives with breast AND/OR ovarian cancer No   2 or more relatives with breast AND/OR bowel cancer No              History of abnormal Pap smear: not indicated, s/p hysterectomy       ASCVD Risk   The 10-year ASCVD risk score (Theron KING, et al., 2019) is: 6.1%    Values used to calculate the score:      Age: 64 years      Sex: Female      Is Non- : No      Diabetic: No      Tobacco smoker: No      Systolic Blood Pressure: 130 mmHg      Is BP treated: No      HDL Cholesterol: 49 mg/dL      Total Cholesterol: 206 mg/dL            Reviewed and updated as needed this visit by Provider   Tobacco  Allergies  Meds  Problems  Med Hx  Surg Hx  Fam Hx              Current providers sharing in care for this patient include:  Patient Care  "Team:  Maite Mcallister MD as PCP - General (Family Medicine)  Maite Mcallister MD as Assigned PCP    The following health maintenance items are reviewed in Epic and correct as of today:  Health Maintenance   Topic Date Due    COVID-19 Vaccine (7 - 2024-25 season) 03/18/2025    TSH W/FREE T4 REFLEX  04/17/2025    MEDICARE ANNUAL WELLNESS VISIT  03/26/2026    ANNUAL REVIEW OF HM ORDERS  03/26/2026    FALL RISK ASSESSMENT  03/26/2026    MAMMO SCREENING  04/23/2026    DIABETES SCREENING  03/26/2028    LIPID  04/17/2029    ADVANCE CARE PLANNING  03/26/2030    DTAP/TDAP/TD IMMUNIZATION (7 - Td or Tdap) 07/24/2033    DEXA  05/08/2034    PHQ-2 (once per calendar year)  Completed    INFLUENZA VACCINE  Completed    Pneumococcal Vaccine: 50+ Years  Completed    ZOSTER IMMUNIZATION  Completed    RSV VACCINE  Completed    HPV IMMUNIZATION  Aged Out    MENINGITIS IMMUNIZATION  Aged Out    HEPATITIS C SCREENING  Discontinued    HIV SCREENING  Discontinued    COLORECTAL CANCER SCREENING  Discontinued            Objective    Exam  /78 (BP Location: Right arm, Patient Position: Sitting, Cuff Size: Adult Large)   Pulse 78   Resp 14   Ht 1.683 m (5' 6.25\")   Wt 90.9 kg (200 lb 8 oz)   LMP  (LMP Unknown)   SpO2 97%   BMI 32.12 kg/m     Estimated body mass index is 32.12 kg/m  as calculated from the following:    Height as of this encounter: 1.683 m (5' 6.25\").    Weight as of this encounter: 90.9 kg (200 lb 8 oz).    Physical Exam  GENERAL: alert and no distress  EYES: Eyes grossly normal to inspection, PERRL and conjunctivae and sclerae normal  HENT: ear canals and TM's normal, nose and mouth without ulcers or lesions  NECK: no adenopathy, no asymmetry, masses, or scars  RESP: lungs clear to auscultation - no rales, rhonchi or wheezes  CV: regular rate and rhythm, normal S1 S2, no S3 or S4, no murmur, click or rub, no peripheral edema  ABDOMEN: soft, nontender, no hepatosplenomegaly, no masses and bowel " sounds normal  MS: no gross musculoskeletal defects noted, no edema  SKIN: no suspicious lesions or rashes  NEURO: Normal strength and tone, mentation intact and speech normal  PSYCH: mentation appears normal, affect normal/bright         3/26/2025   Mini Cog   Clock Draw Score 2 Normal   3 Item Recall 3 objects recalled   Mini Cog Total Score 5         Vision Screen  Reason Vision Screen Not Completed: Screening Recommend: Patient/Guardian Declined (Pt seen eye doctor recently)      Signed Electronically by: Maite Mcallister MD

## 2025-03-27 LAB
ATRIAL RATE - MUSE: 60 BPM
DIASTOLIC BLOOD PRESSURE - MUSE: NORMAL MMHG
INTERPRETATION ECG - MUSE: NORMAL
P AXIS - MUSE: 43 DEGREES
PR INTERVAL - MUSE: 146 MS
QRS DURATION - MUSE: 80 MS
QT - MUSE: 406 MS
QTC - MUSE: 406 MS
R AXIS - MUSE: 26 DEGREES
SYSTOLIC BLOOD PRESSURE - MUSE: NORMAL MMHG
T AXIS - MUSE: 20 DEGREES
VENTRICULAR RATE- MUSE: 60 BPM

## 2025-04-01 ENCOUNTER — MYC MEDICAL ADVICE (OUTPATIENT)
Dept: FAMILY MEDICINE | Facility: CLINIC | Age: 65
End: 2025-04-01
Payer: MEDICARE

## 2025-04-01 DIAGNOSIS — R94.31 ABNORMAL ELECTROCARDIOGRAM: Primary | ICD-10-CM

## 2025-04-03 NOTE — TELEPHONE ENCOUNTER
Called pt to review EKG showing old septal infarct and labs per results  She is agreeable to stress testing  Asymptomatic

## 2025-04-15 ENCOUNTER — HOSPITAL ENCOUNTER (OUTPATIENT)
Dept: CARDIOLOGY | Facility: CLINIC | Age: 65
Discharge: HOME | End: 2025-04-15
Attending: FAMILY MEDICINE | Admitting: FAMILY MEDICINE
Payer: MEDICARE

## 2025-04-15 DIAGNOSIS — R94.31 ABNORMAL ELECTROCARDIOGRAM: ICD-10-CM

## 2025-04-15 LAB
CV STRESS CURRENT BP HE: NORMAL
CV STRESS CURRENT HR HE: 103
CV STRESS CURRENT HR HE: 103
CV STRESS CURRENT HR HE: 112
CV STRESS CURRENT HR HE: 114
CV STRESS CURRENT HR HE: 118
CV STRESS CURRENT HR HE: 124
CV STRESS CURRENT HR HE: 127
CV STRESS CURRENT HR HE: 127
CV STRESS CURRENT HR HE: 128
CV STRESS CURRENT HR HE: 136
CV STRESS CURRENT HR HE: 136
CV STRESS CURRENT HR HE: 141
CV STRESS CURRENT HR HE: 144
CV STRESS CURRENT HR HE: 147
CV STRESS CURRENT HR HE: 147
CV STRESS CURRENT HR HE: 148
CV STRESS CURRENT HR HE: 78
CV STRESS CURRENT HR HE: 80
CV STRESS CURRENT HR HE: 81
CV STRESS CURRENT HR HE: 82
CV STRESS CURRENT HR HE: 84
CV STRESS CURRENT HR HE: 84
CV STRESS CURRENT HR HE: 85
CV STRESS CURRENT HR HE: 86
CV STRESS CURRENT HR HE: 88
CV STRESS CURRENT HR HE: 88
CV STRESS CURRENT HR HE: 93
CV STRESS DEVIATION TIME HE: NORMAL
CV STRESS ECHO PERCENT HR HE: NORMAL
CV STRESS EXERCISE STAGE HE: NORMAL
CV STRESS EXERCISE STAGE REACHED HE: NORMAL
CV STRESS FINAL RESTING BP HE: NORMAL
CV STRESS FINAL RESTING HR HE: 81
CV STRESS MAX HR HE: 149
CV STRESS MAX TREADMILL GRADE HE: 14
CV STRESS MAX TREADMILL SPEED HE: 3.4
CV STRESS PEAK DIA BP HE: NORMAL
CV STRESS PEAK SYS BP HE: NORMAL
CV STRESS PHASE HE: NORMAL
CV STRESS PROTOCOL HE: NORMAL
CV STRESS REASON STOPPED HE: NORMAL
CV STRESS RESTING PT POSITION HE: NORMAL
CV STRESS RESTING PT POSITION HE: NORMAL
CV STRESS ST DEVIATION AMOUNT HE: NORMAL
CV STRESS ST DEVIATION ELEVATION HE: NORMAL
CV STRESS ST EVELATION AMOUNT HE: NORMAL
CV STRESS SYMPTOMS HE: NORMAL
CV STRESS TEST TYPE HE: NORMAL
CV STRESS TOTAL STAGE TIME MIN 1 HE: NORMAL
STRESS ECHO BASELINE DIASTOLIC HE: 86
STRESS ECHO BASELINE HR: 79
STRESS ECHO BASELINE SYSTOLIC BP: 139
STRESS ECHO LAST STRESS DIASTOLIC BP: 78
STRESS ECHO LAST STRESS HR: 147
STRESS ECHO LAST STRESS SYSTOLIC BP: 180
STRESS ECHO POST ESTIMATED WORKLOAD: 10.3
STRESS ECHO POST EXERCISE DUR MIN: 8
STRESS ECHO POST EXERCISE DUR SEC: 0
STRESS ECHO TARGET HR: 132

## 2025-04-15 PROCEDURE — 93350 STRESS TTE ONLY: CPT | Mod: TC

## 2025-04-15 PROCEDURE — 93321 DOPPLER ECHO F-UP/LMTD STD: CPT | Mod: 26 | Performed by: GENERAL ACUTE CARE HOSPITAL

## 2025-04-15 PROCEDURE — 93018 CV STRESS TEST I&R ONLY: CPT | Performed by: GENERAL ACUTE CARE HOSPITAL

## 2025-04-15 PROCEDURE — 93350 STRESS TTE ONLY: CPT | Mod: 26 | Performed by: GENERAL ACUTE CARE HOSPITAL

## 2025-04-15 PROCEDURE — 93016 CV STRESS TEST SUPVJ ONLY: CPT | Performed by: INTERNAL MEDICINE

## 2025-04-15 PROCEDURE — 93325 DOPPLER ECHO COLOR FLOW MAPG: CPT | Mod: TC

## 2025-04-15 PROCEDURE — 93325 DOPPLER ECHO COLOR FLOW MAPG: CPT | Mod: 26 | Performed by: GENERAL ACUTE CARE HOSPITAL

## 2025-04-16 ENCOUNTER — TRANSFERRED RECORDS (OUTPATIENT)
Dept: HEALTH INFORMATION MANAGEMENT | Facility: CLINIC | Age: 65
End: 2025-04-16

## 2025-04-16 ENCOUNTER — MYC MEDICAL ADVICE (OUTPATIENT)
Dept: ANTICOAGULATION | Facility: CLINIC | Age: 65
End: 2025-04-16

## 2025-04-16 ENCOUNTER — LAB (OUTPATIENT)
Dept: LAB | Facility: CLINIC | Age: 65
End: 2025-04-16
Payer: MEDICARE

## 2025-04-16 ENCOUNTER — ANTICOAGULATION THERAPY VISIT (OUTPATIENT)
Dept: ANTICOAGULATION | Facility: CLINIC | Age: 65
End: 2025-04-16

## 2025-04-16 ENCOUNTER — HOSPITAL ENCOUNTER (OUTPATIENT)
Dept: BONE DENSITY | Facility: HOSPITAL | Age: 65
Discharge: HOME | End: 2025-04-16
Attending: FAMILY MEDICINE
Payer: MEDICARE

## 2025-04-16 DIAGNOSIS — D68.62 LUPUS ANTICOAGULANT DISORDER: Primary | ICD-10-CM

## 2025-04-16 DIAGNOSIS — E03.9 HYPOTHYROIDISM: Primary | ICD-10-CM

## 2025-04-16 DIAGNOSIS — Z78.0 POST-MENOPAUSAL: ICD-10-CM

## 2025-04-16 DIAGNOSIS — D68.62 LUPUS ANTICOAGULANT DISORDER: ICD-10-CM

## 2025-04-16 DIAGNOSIS — D73.5 SPLENIC INFARCT: ICD-10-CM

## 2025-04-16 LAB — INR BLD: 2.4 (ref 0.9–1.1)

## 2025-04-16 PROCEDURE — 77080 DXA BONE DENSITY AXIAL: CPT

## 2025-04-16 PROCEDURE — 36416 COLLJ CAPILLARY BLOOD SPEC: CPT

## 2025-04-16 PROCEDURE — 85610 PROTHROMBIN TIME: CPT

## 2025-04-16 NOTE — PROGRESS NOTES
ANTICOAGULATION MANAGEMENT     Silvia Martinez 64 year old female is on warfarin with therapeutic INR result. (Goal INR 2.0-3.0)    Recent labs: (last 7 days)     04/16/25  1007   INR 2.4*       ASSESSMENT     Warfarin Lab Questionnaire    Warfarin Doses Last 7 Days      4/16/2025    10:03 AM   Dose in Tablet or Mg   TAB or MG? milligram (mg)     Pt Rptd Dose SUNDAY MONDAY TUESDAY WED THURS FRIDAY SATURDAY 4/16/2025  10:03 AM 5 5 5 5 5 5 5         4/16/2025   Warfarin Lab Questionnaire   Missed doses within past 14 days? No   Changes in diet or alcohol within past 14 days? No   Medication changes since last result? No   Injuries or illness since last result? No   New shortness of breath, severe headaches or sudden changes in vision since last result? No   Abnormal bleeding since last result? No   Upcoming surgery, procedure? No   Best number to call with results? [phone]     Previous result: Therapeutic last 2(+) visits  Additional findings: None       PLAN     Recommended plan for no diet, medication or health factor changes affecting INR     Dosing Instructions: Continue your current warfarin dose with next INR in 6 weeks       Summary  As of 4/16/2025      Full warfarin instructions:  5 mg every day   Next INR check:  5/28/2025               Detailed voice message left for Silvia with dosing instructions and follow up date.   Sent [employer] message with dosing and follow up instructions    Lab visit scheduled    Education provided: Please call back if any changes to your diet, medications or how you've been taking warfarin  Contact [phone] with any changes, questions or concerns.     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

## 2025-04-17 ENCOUNTER — MYC MEDICAL ADVICE (OUTPATIENT)
Dept: FAMILY MEDICINE | Facility: CLINIC | Age: 65
End: 2025-04-17
Payer: MEDICARE

## 2025-05-29 ENCOUNTER — RESULTS FOLLOW-UP (OUTPATIENT)
Dept: ANTICOAGULATION | Facility: CLINIC | Age: 65
End: 2025-05-29

## 2025-05-29 ENCOUNTER — ANTICOAGULATION THERAPY VISIT (OUTPATIENT)
Dept: ANTICOAGULATION | Facility: CLINIC | Age: 65
End: 2025-05-29

## 2025-05-29 ENCOUNTER — LAB (OUTPATIENT)
Dept: LAB | Facility: CLINIC | Age: 65
End: 2025-05-29
Payer: MEDICARE

## 2025-05-29 DIAGNOSIS — D73.5 SPLENIC INFARCT: ICD-10-CM

## 2025-05-29 DIAGNOSIS — D68.62 LUPUS ANTICOAGULANT DISORDER: Primary | ICD-10-CM

## 2025-05-29 DIAGNOSIS — D68.62 LUPUS ANTICOAGULANT DISORDER: ICD-10-CM

## 2025-05-29 LAB — INR BLD: 2.2 (ref 0.9–1.1)

## 2025-05-29 NOTE — PROGRESS NOTES
ANTICOAGULATION MANAGEMENT     Silvia Martinez 65 year old female is on warfarin with therapeutic INR result. (Goal INR 2.0-3.0)    Recent labs: (last 7 days)     05/29/25  0941   INR 2.2*       ASSESSMENT     Warfarin Lab Questionnaire    Warfarin Doses Last 7 Days      5/28/2025     9:39 PM   Dose in Tablet or Mg   TAB or MG? milligram (mg)     Pt Rptd Dose SUNDAY MONDAY TUESDAY WED THURS FRIDAY SATURDAY 5/28/2025   9:39 PM 5 5 5 5 5 5 5         5/28/2025   Warfarin Lab Questionnaire   Missed doses within past 14 days? No    No   Changes in diet or alcohol within past 14 days? No    No   Medication changes since last result? No    No   Injuries or illness since last result? No    No   New shortness of breath, severe headaches or sudden changes in vision since last result? No    No   Abnormal bleeding since last result? No    No   Upcoming surgery, procedure? No    No   Best number to call with results? [phone]    [phone]       Multiple values from one day are sorted in reverse-chronological order     Previous result: Therapeutic last 2(+) visits  Additional findings: None       PLAN     Recommended plan for no diet, medication or health factor changes affecting INR     Dosing Instructions: Continue your current warfarin dose with next INR in 6 weeks       Summary  As of 5/29/2025      Full warfarin instructions:  5 mg every day   Next INR check:  7/10/2025               Detailed voice message left for Silvia with dosing instructions and follow up date.     Lab visit scheduled    Education provided: Please call back if any changes to your diet, medications or how you've been taking warfarin    Plan made per [facility] anticoagulation protocol    Lavern Porras RN  5/29/2025  Anticoagulation Clinic  [facility] for routing messages: seven CALVO  [facility] patient phone line: [phone]        _______________________________________________________________________     Anticoagulation Episode Summary       Current INR  goal:  2.0-3.0   TTR:  100.0% (1 y)   Target end date:  Indefinite   Send INR reminders to:  [facility]    Indications    Splenic infarct (Resolved) [D73.5]  Splenic infarct (Resolved) [D73.5]  Lupus anticoagulant disorder (H) with hx of splenic infarct [D68.62]  Splenic infarct [D73.5]             Comments:  --             Anticoagulation Care Providers       Provider Role Specialty Phone number    Jose Ramon Bolton MD Referring [facility] Medicine [phone]    Rosa Maria Alcaraz MD Referring [facility] Medicine [phone]    Maite Mcallister MD Referring [facility] [phone]

## 2025-06-02 ENCOUNTER — TRANSFERRED RECORDS (OUTPATIENT)
Dept: HEALTH INFORMATION MANAGEMENT | Facility: CLINIC | Age: 65
End: 2025-06-02
Payer: MEDICARE

## 2025-06-21 ENCOUNTER — MYC MEDICAL ADVICE (OUTPATIENT)
Dept: FAMILY MEDICINE | Facility: CLINIC | Age: 65
End: 2025-06-21
Payer: MEDICARE

## 2025-07-08 ENCOUNTER — ANTICOAGULATION THERAPY VISIT (OUTPATIENT)
Dept: ANTICOAGULATION | Facility: CLINIC | Age: 65
End: 2025-07-08

## 2025-07-08 ENCOUNTER — LAB (OUTPATIENT)
Dept: LAB | Facility: CLINIC | Age: 65
End: 2025-07-08
Payer: MEDICARE

## 2025-07-08 DIAGNOSIS — D68.62 LUPUS ANTICOAGULANT DISORDER: ICD-10-CM

## 2025-07-08 DIAGNOSIS — D73.5 SPLENIC INFARCT: ICD-10-CM

## 2025-07-08 DIAGNOSIS — D68.62 LUPUS ANTICOAGULANT DISORDER: Primary | ICD-10-CM

## 2025-07-08 LAB — INR BLD: 1.9 (ref 0.9–1.1)

## 2025-07-08 PROCEDURE — 36416 COLLJ CAPILLARY BLOOD SPEC: CPT

## 2025-07-08 PROCEDURE — 85610 PROTHROMBIN TIME: CPT

## 2025-07-08 NOTE — PROGRESS NOTES
ANTICOAGULATION MANAGEMENT     Silvia Patelmichelle 65 year old female is on warfarin with subtherapeutic INR result. (Goal INR 2.0-3.0)    Recent labs: (last 7 days)     07/08/25  0818   INR 1.9*       ASSESSMENT     Warfarin Lab Questionnaire    Warfarin Doses Last 7 Days      7/7/2025    10:33 AM   Dose in Tablet or Mg   TAB or MG? milligram (mg)     Pt Rptd Dose SUNDAY MONDAY TUESDAY WED THURS FRIDAY SATURDAY 7/7/2025  10:33 AM 5 5 5 5 5 5 5         7/7/2025   Warfarin Lab Questionnaire   Missed doses within past 14 days? No   Changes in diet or alcohol within past 14 days? No   Medication changes since last result? No   Injuries or illness since last result? No   New shortness of breath, severe headaches or sudden changes in vision since last result? No   Abnormal bleeding since last result? No   Upcoming surgery, procedure? No   Best number to call with results? [phone]     Previous result: Therapeutic last 2(+) visits  Additional findings: None       PLAN     Recommended plan for no diet, medication or health factor changes affecting INR     Dosing Instructions: Continue your current warfarin dose with next INR in 2 weeks       Summary  As of 7/8/2025      Full warfarin instructions:  5 mg every day   Next INR check:  7/22/2025               Telephone call with Silvia who agrees to plan and repeated back plan correctly    Lab visit scheduled    Education provided: Please call back if any changes to your diet, medications or how you've been taking warfarin  Goal range and lab monitoring: goal range and significance of current result and Importance of therapeutic range  Contact [phone] with any changes, questions or concerns.     Plan made per [facility] anticoagulation protocol    Mechelle Cox, RN  7/8/2025  Anticoagulation Clinic  [facility] for routing messages: seven CALVO  [facility] patient phone line: [phone]        _______________________________________________________________________      Anticoagulation Episode Summary       Current INR goal:  2.0-3.0   TTR:  96.4% (1 y)   Target end date:  Indefinite   Send INR reminders to:  [facility]    Indications    Splenic infarct (Resolved) [D73.5]  Splenic infarct (Resolved) [D73.5]  Lupus anticoagulant disorder (H) with hx of splenic infarct [D68.62]  Splenic infarct [D73.5]             Comments:  --             Anticoagulation Care Providers       Provider Role Specialty Phone number    Jose Ramon Bolton MD Referring [facility] Medicine [phone]    Rosa Maria Alcaraz MD Referring [facility] Medicine [phone]    Maite Mcallister MD Referring [facility] [phone]

## 2025-07-22 ENCOUNTER — LAB (OUTPATIENT)
Dept: LAB | Facility: CLINIC | Age: 65
End: 2025-07-22
Payer: MEDICARE

## 2025-07-22 ENCOUNTER — ANTICOAGULATION THERAPY VISIT (OUTPATIENT)
Dept: ANTICOAGULATION | Facility: CLINIC | Age: 65
End: 2025-07-22

## 2025-07-22 DIAGNOSIS — D73.5 SPLENIC INFARCT: ICD-10-CM

## 2025-07-22 DIAGNOSIS — D68.62 LUPUS ANTICOAGULANT DISORDER: Primary | ICD-10-CM

## 2025-07-22 DIAGNOSIS — D68.62 LUPUS ANTICOAGULANT DISORDER: ICD-10-CM

## 2025-07-22 LAB — INR BLD: 2.1 (ref 0.9–1.1)

## 2025-07-22 PROCEDURE — 36416 COLLJ CAPILLARY BLOOD SPEC: CPT

## 2025-07-22 PROCEDURE — 85610 PROTHROMBIN TIME: CPT

## 2025-07-22 NOTE — PROGRESS NOTES
ANTICOAGULATION MANAGEMENT     Silvia Martinez 65 year old female is on warfarin with therapeutic INR result. (Goal INR 2.0-3.0)    Recent labs: (last 7 days)     07/22/25  1055   INR 2.1*       ASSESSMENT     Warfarin Lab Questionnaire    Warfarin Doses Last 7 Days      7/21/2025    10:58 AM   Dose in Tablet or Mg   TAB or MG? milligram (mg)     Pt Rptd Dose SUNDAY MONDAY TUESDAY WED THURS FRIDAY SATURDAY 7/21/2025  10:58 AM 5 5 5 5 5 5 5         7/21/2025   Warfarin Lab Questionnaire   Missed doses within past 14 days? No   Changes in diet or alcohol within past 14 days? No   Medication changes since last result? No   Injuries or illness since last result? No   New shortness of breath, severe headaches or sudden changes in vision since last result? No   Abnormal bleeding since last result? No   Upcoming surgery, procedure? No   Best number to call with results? [phone]     Previous result: Subtherapeutic  Additional findings: None       PLAN     Recommended plan for no diet, medication or health factor changes affecting INR     Dosing Instructions: Continue your current warfarin dose with next INR in 3 weeks       Summary  As of 7/22/2025      Full warfarin instructions:  5 mg every day   Next INR check:  8/12/2025               Detailed voice message left for Silvia with dosing instructions and follow up date.   Sent [employer] message with dosing and follow up instructions    Contact [phone] to schedule and with any changes, questions or concerns.     Education provided: Please call back if any changes to your diet, medications or how you've been taking warfarin  Contact [phone] with any changes, questions or concerns.     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

## 2025-08-01 ENCOUNTER — TELEPHONE (OUTPATIENT)
Dept: FAMILY MEDICINE | Facility: CLINIC | Age: 65
End: 2025-08-01
Payer: MEDICARE

## 2025-08-09 ENCOUNTER — TRANSFERRED RECORDS (OUTPATIENT)
Dept: HEALTH INFORMATION MANAGEMENT | Facility: CLINIC | Age: 65
End: 2025-08-09
Payer: MEDICARE

## 2025-08-09 ENCOUNTER — TELEPHONE (OUTPATIENT)
Dept: FAMILY MEDICINE | Facility: CLINIC | Age: 65
End: 2025-08-09
Payer: MEDICARE

## 2025-08-12 ENCOUNTER — DOCUMENTATION ONLY (OUTPATIENT)
Dept: ANTICOAGULATION | Facility: CLINIC | Age: 65
End: 2025-08-12

## 2025-08-12 ENCOUNTER — LAB (OUTPATIENT)
Dept: LAB | Facility: CLINIC | Age: 65
End: 2025-08-12
Payer: MEDICARE

## 2025-08-12 ENCOUNTER — ANTICOAGULATION THERAPY VISIT (OUTPATIENT)
Dept: ANTICOAGULATION | Facility: CLINIC | Age: 65
End: 2025-08-12

## 2025-08-12 ENCOUNTER — TRANSFERRED RECORDS (OUTPATIENT)
Dept: HEALTH INFORMATION MANAGEMENT | Facility: CLINIC | Age: 65
End: 2025-08-12

## 2025-08-12 DIAGNOSIS — D73.5 SPLENIC INFARCT: ICD-10-CM

## 2025-08-12 DIAGNOSIS — D68.62 LUPUS ANTICOAGULANT DISORDER: ICD-10-CM

## 2025-08-12 DIAGNOSIS — D68.62 LUPUS ANTICOAGULANT DISORDER: Primary | ICD-10-CM

## 2025-08-12 DIAGNOSIS — Z79.01 CHRONIC ANTICOAGULATION: ICD-10-CM

## 2025-08-12 LAB — INR BLD: 2.4 (ref 0.9–1.1)

## 2025-08-12 PROCEDURE — 85610 PROTHROMBIN TIME: CPT

## 2025-08-12 PROCEDURE — 36416 COLLJ CAPILLARY BLOOD SPEC: CPT

## 2025-08-13 ENCOUNTER — TELEPHONE (OUTPATIENT)
Dept: FAMILY MEDICINE | Facility: CLINIC | Age: 65
End: 2025-08-13
Payer: MEDICARE